# Patient Record
Sex: FEMALE | Race: WHITE | NOT HISPANIC OR LATINO | Employment: FULL TIME | URBAN - NONMETROPOLITAN AREA
[De-identification: names, ages, dates, MRNs, and addresses within clinical notes are randomized per-mention and may not be internally consistent; named-entity substitution may affect disease eponyms.]

---

## 2017-02-02 ENCOUNTER — OFFICE VISIT - GICH (OUTPATIENT)
Dept: FAMILY MEDICINE | Facility: OTHER | Age: 32
End: 2017-02-02

## 2017-02-02 ENCOUNTER — HISTORY (OUTPATIENT)
Dept: FAMILY MEDICINE | Facility: OTHER | Age: 32
End: 2017-02-02

## 2017-02-02 ENCOUNTER — HOSPITAL ENCOUNTER (OUTPATIENT)
Dept: RADIOLOGY | Facility: OTHER | Age: 32
End: 2017-02-02
Attending: PHYSICIAN ASSISTANT

## 2017-02-02 DIAGNOSIS — F41.9 ANXIETY DISORDER: ICD-10-CM

## 2017-02-02 DIAGNOSIS — R07.89 OTHER CHEST PAIN: ICD-10-CM

## 2017-02-02 DIAGNOSIS — F17.200 NICOTINE DEPENDENCE, UNCOMPLICATED: ICD-10-CM

## 2017-02-02 LAB
ABSOLUTE BASOPHILS - HISTORICAL: 0.1 THOU/CU MM
ABSOLUTE EOSINOPHILS - HISTORICAL: 0.2 THOU/CU MM
ABSOLUTE LYMPHOCYTES - HISTORICAL: 2.3 THOU/CU MM (ref 0.9–2.9)
ABSOLUTE MONOCYTES - HISTORICAL: 0.5 THOU/CU MM
ABSOLUTE NEUTROPHILS - HISTORICAL: 3.8 THOU/CU MM (ref 1.7–7)
ANION GAP - HISTORICAL: 10 (ref 5–18)
BASOPHILS # BLD AUTO: 0.8 %
BUN SERPL-MCNC: 6 MG/DL (ref 7–25)
BUN/CREAT RATIO - HISTORICAL: 9
CALCIUM SERPL-MCNC: 9.3 MG/DL (ref 8.6–10.3)
CHLORIDE SERPLBLD-SCNC: 106 MMOL/L (ref 98–107)
CO2 SERPL-SCNC: 23 MMOL/L (ref 21–31)
CREAT SERPL-MCNC: 0.68 MG/DL (ref 0.7–1.3)
D-DIMER, QUANTITATIVE NG/ML - HISTORICAL: <200 NG/ML
EOSINOPHIL NFR BLD AUTO: 2.9 %
ERYTHROCYTE [DISTWIDTH] IN BLOOD BY AUTOMATED COUNT: 11.9 % (ref 11.5–15.5)
GFR IF NOT AFRICAN AMERICAN - HISTORICAL: >60 ML/MIN/1.73M2
GLUCOSE SERPL-MCNC: 96 MG/DL (ref 70–105)
HCT VFR BLD AUTO: 41.7 % (ref 33–51)
HEMOGLOBIN: 14.1 G/DL (ref 12–16)
LYMPHOCYTES NFR BLD AUTO: 33.2 % (ref 20–44)
MCH RBC QN AUTO: 30.4 PG (ref 26–34)
MCHC RBC AUTO-ENTMCNC: 34 G/DL (ref 32–36)
MCV RBC AUTO: 90 FL (ref 80–100)
MONOCYTES NFR BLD AUTO: 6.7 %
NEUTROPHILS NFR BLD AUTO: 56.4 % (ref 42–72)
PLATELET # BLD AUTO: 287 THOU/CU MM (ref 140–440)
PMV BLD: 7.2 FL (ref 6.5–11)
POTASSIUM SERPL-SCNC: 3.8 MMOL/L (ref 3.5–5.1)
RED BLOOD COUNT - HISTORICAL: 4.65 MIL/CU MM (ref 4–5.2)
SODIUM SERPL-SCNC: 139 MMOL/L (ref 133–143)
TSH - HISTORICAL: 1.01 UIU/ML (ref 0.34–5.6)
WHITE BLOOD COUNT - HISTORICAL: 6.8 THOU/CU MM (ref 4.5–11)

## 2017-02-02 ASSESSMENT — ANXIETY QUESTIONNAIRES
6. BECOMING EASILY ANNOYED OR IRRITABLE: NEARLY EVERY DAY
1. FEELING NERVOUS, ANXIOUS, OR ON EDGE: NEARLY EVERY DAY
2. NOT BEING ABLE TO STOP OR CONTROL WORRYING: NEARLY EVERY DAY
4. TROUBLE RELAXING: NEARLY EVERY DAY
5. BEING SO RESTLESS THAT IT IS HARD TO SIT STILL: NEARLY EVERY DAY
3. WORRYING TOO MUCH ABOUT DIFFERENT THINGS: NEARLY EVERY DAY
7. FEELING AFRAID AS IF SOMETHING AWFUL MIGHT HAPPEN: MORE THAN HALF THE DAYS
GAD7 TOTAL SCORE: 20

## 2017-02-02 ASSESSMENT — PATIENT HEALTH QUESTIONNAIRE - PHQ9: SUM OF ALL RESPONSES TO PHQ QUESTIONS 1-9: 0

## 2017-03-06 ENCOUNTER — COMMUNICATION - GICH (OUTPATIENT)
Dept: FAMILY MEDICINE | Facility: OTHER | Age: 32
End: 2017-03-06

## 2017-03-06 DIAGNOSIS — F41.9 ANXIETY DISORDER: ICD-10-CM

## 2017-03-13 ENCOUNTER — HISTORY (OUTPATIENT)
Dept: FAMILY MEDICINE | Facility: OTHER | Age: 32
End: 2017-03-13

## 2017-03-13 ENCOUNTER — OFFICE VISIT - GICH (OUTPATIENT)
Dept: FAMILY MEDICINE | Facility: OTHER | Age: 32
End: 2017-03-13

## 2017-03-13 DIAGNOSIS — J30.89 OTHER ALLERGIC RHINITIS: ICD-10-CM

## 2017-03-13 DIAGNOSIS — F41.9 ANXIETY DISORDER: ICD-10-CM

## 2017-03-13 ASSESSMENT — ANXIETY QUESTIONNAIRES
7. FEELING AFRAID AS IF SOMETHING AWFUL MIGHT HAPPEN: NOT AT ALL
6. BECOMING EASILY ANNOYED OR IRRITABLE: MORE THAN HALF THE DAYS
3. WORRYING TOO MUCH ABOUT DIFFERENT THINGS: SEVERAL DAYS
GAD7 TOTAL SCORE: 10
1. FEELING NERVOUS, ANXIOUS, OR ON EDGE: MORE THAN HALF THE DAYS
2. NOT BEING ABLE TO STOP OR CONTROL WORRYING: SEVERAL DAYS
4. TROUBLE RELAXING: MORE THAN HALF THE DAYS
5. BEING SO RESTLESS THAT IT IS HARD TO SIT STILL: MORE THAN HALF THE DAYS

## 2017-03-13 ASSESSMENT — PATIENT HEALTH QUESTIONNAIRE - PHQ9: SUM OF ALL RESPONSES TO PHQ QUESTIONS 1-9: 2

## 2017-09-26 ENCOUNTER — HISTORY (OUTPATIENT)
Dept: EMERGENCY MEDICINE | Facility: OTHER | Age: 32
End: 2017-09-26

## 2017-09-29 ENCOUNTER — COMMUNICATION - GICH (OUTPATIENT)
Dept: OBGYN | Facility: OTHER | Age: 32
End: 2017-09-29

## 2017-09-29 ENCOUNTER — AMBULATORY - GICH (OUTPATIENT)
Dept: OBGYN | Facility: OTHER | Age: 32
End: 2017-09-29

## 2017-09-29 DIAGNOSIS — Z33.1 PREGNANT STATE, INCIDENTAL: ICD-10-CM

## 2017-10-03 ENCOUNTER — HISTORY (OUTPATIENT)
Dept: OBGYN | Facility: OTHER | Age: 32
End: 2017-10-03

## 2017-10-03 ENCOUNTER — HOSPITAL ENCOUNTER (OUTPATIENT)
Dept: RADIOLOGY | Facility: OTHER | Age: 32
End: 2017-10-03

## 2017-10-03 ENCOUNTER — PRENATAL OFFICE VISIT - GICH (OUTPATIENT)
Dept: OBGYN | Facility: OTHER | Age: 32
End: 2017-10-03

## 2017-10-03 DIAGNOSIS — Z34.90 ENCOUNTER FOR SUPERVISION OF NORMAL PREGNANCY: ICD-10-CM

## 2017-10-03 DIAGNOSIS — O20.0 THREATENED ABORTION: ICD-10-CM

## 2017-10-17 ENCOUNTER — HISTORY (OUTPATIENT)
Dept: OBGYN | Facility: OTHER | Age: 32
End: 2017-10-17

## 2017-10-17 ENCOUNTER — PRENATAL OFFICE VISIT - GICH (OUTPATIENT)
Dept: OBGYN | Facility: OTHER | Age: 32
End: 2017-10-17

## 2017-10-17 DIAGNOSIS — F41.9 ANXIETY DISORDER: ICD-10-CM

## 2017-10-17 DIAGNOSIS — Z12.4 ENCOUNTER FOR SCREENING FOR MALIGNANT NEOPLASM OF CERVIX: ICD-10-CM

## 2017-10-17 DIAGNOSIS — Z72.0 TOBACCO USE: ICD-10-CM

## 2017-10-17 DIAGNOSIS — O21.9 VOMITING OF PREGNANCY: ICD-10-CM

## 2017-10-17 DIAGNOSIS — Z71.6 TOBACCO ABUSE COUNSELING: ICD-10-CM

## 2017-10-17 DIAGNOSIS — Z34.01 ENCOUNTER FOR SUPERVISION OF NORMAL FIRST PREGNANCY IN FIRST TRIMESTER: ICD-10-CM

## 2017-10-17 LAB
ABORH - HISTORICAL: NORMAL
ABSOLUTE BASOPHILS - HISTORICAL: 0 THOU/CU MM
ABSOLUTE EOSINOPHILS - HISTORICAL: 0.2 THOU/CU MM
ABSOLUTE IMMATURE GRANULOCYTES(METAS,MYELOS,PROS) - HISTORICAL: 0 THOU/CU MM
ABSOLUTE LYMPHOCYTES - HISTORICAL: 2.2 THOU/CU MM (ref 0.9–2.9)
ABSOLUTE MONOCYTES - HISTORICAL: 0.7 THOU/CU MM
ABSOLUTE NEUTROPHILS - HISTORICAL: 6.8 THOU/CU MM (ref 1.7–7)
ANTIBODY SCREEN - HISTORICAL: NEGATIVE
BASOPHILS # BLD AUTO: 0.3 %
EOSINOPHIL NFR BLD AUTO: 1.5 %
ERYTHROCYTE [DISTWIDTH] IN BLOOD BY AUTOMATED COUNT: 12 % (ref 11.5–15.5)
HCT VFR BLD AUTO: 36.9 % (ref 33–51)
HEMOGLOBIN: 12.9 G/DL (ref 12–16)
IMMATURE GRANULOCYTES(METAS,MYELOS,PROS) - HISTORICAL: 0.4 %
LYMPHOCYTES NFR BLD AUTO: 22.1 % (ref 20–44)
MCH RBC QN AUTO: 30.9 PG (ref 26–34)
MCHC RBC AUTO-ENTMCNC: 35 G/DL (ref 32–36)
MCV RBC AUTO: 88 FL (ref 80–100)
MONOCYTES NFR BLD AUTO: 7.2 %
NEUTROPHILS NFR BLD AUTO: 68.5 % (ref 42–72)
PLATELET # BLD AUTO: 261 THOU/CU MM (ref 140–440)
PMV BLD: 9.5 FL (ref 6.5–11)
RED BLOOD COUNT - HISTORICAL: 4.18 MIL/CU MM (ref 4–5.2)
RUBELLA COMMENT - HISTORICAL: NORMAL
SPECIMEN EXPIRATION DATE/TIME - HISTORICAL: NORMAL
WHITE BLOOD COUNT - HISTORICAL: 9.9 THOU/CU MM (ref 4.5–11)

## 2017-10-17 ASSESSMENT — PATIENT HEALTH QUESTIONNAIRE - PHQ9: SUM OF ALL RESPONSES TO PHQ QUESTIONS 1-9: 9

## 2017-10-18 LAB
HBSAG CATEGORY - HISTORICAL: NONREACTIVE
HIV-1/HIV-2 ANTIBODY CATEGORY - HISTORICAL: NORMAL

## 2017-10-19 LAB — TREPONEMA PALLIDUM - HISTORICAL: NEGATIVE

## 2017-10-24 ENCOUNTER — COMMUNICATION - GICH (OUTPATIENT)
Dept: OBGYN | Facility: OTHER | Age: 32
End: 2017-10-24

## 2017-10-24 LAB — HPV RESULTS - HISTORICAL: NEGATIVE

## 2017-11-17 ENCOUNTER — PRENATAL OFFICE VISIT - GICH (OUTPATIENT)
Dept: OBGYN | Facility: OTHER | Age: 32
End: 2017-11-17

## 2017-11-17 ENCOUNTER — HISTORY (OUTPATIENT)
Dept: OBGYN | Facility: OTHER | Age: 32
End: 2017-11-17

## 2017-11-17 DIAGNOSIS — F41.9 ANXIETY DISORDER: ICD-10-CM

## 2017-11-17 DIAGNOSIS — Z34.01 ENCOUNTER FOR SUPERVISION OF NORMAL FIRST PREGNANCY IN FIRST TRIMESTER: ICD-10-CM

## 2017-12-07 ENCOUNTER — COMMUNICATION - GICH (OUTPATIENT)
Dept: OBGYN | Facility: OTHER | Age: 32
End: 2017-12-07

## 2017-12-07 DIAGNOSIS — F41.9 ANXIETY DISORDER: ICD-10-CM

## 2017-12-19 ENCOUNTER — PRENATAL OFFICE VISIT - GICH (OUTPATIENT)
Dept: OBGYN | Facility: OTHER | Age: 32
End: 2017-12-19

## 2017-12-19 ENCOUNTER — HISTORY (OUTPATIENT)
Dept: OBGYN | Facility: OTHER | Age: 32
End: 2017-12-19

## 2017-12-19 DIAGNOSIS — Z34.02 ENCOUNTER FOR SUPERVISION OF NORMAL FIRST PREGNANCY IN SECOND TRIMESTER: ICD-10-CM

## 2017-12-19 ASSESSMENT — ANXIETY QUESTIONNAIRES
1. FEELING NERVOUS, ANXIOUS, OR ON EDGE: MORE THAN HALF THE DAYS
GAD7 TOTAL SCORE: 8
4. TROUBLE RELAXING: MORE THAN HALF THE DAYS
3. WORRYING TOO MUCH ABOUT DIFFERENT THINGS: MORE THAN HALF THE DAYS
6. BECOMING EASILY ANNOYED OR IRRITABLE: MORE THAN HALF THE DAYS
7. FEELING AFRAID AS IF SOMETHING AWFUL MIGHT HAPPEN: NOT AT ALL
5. BEING SO RESTLESS THAT IT IS HARD TO SIT STILL: NOT AT ALL
2. NOT BEING ABLE TO STOP OR CONTROL WORRYING: NOT AT ALL

## 2017-12-27 NOTE — PROGRESS NOTES
Patient Information     Patient Name MRN Sex Sofya Platt [id number] Female 1985      Progress Notes by Claudia Morfin MD at 10/3/2017  2:45 PM     Author:  Claudia Morfin MD Service:  (none) Author Type:  Physician     Filed:  10/3/2017  3:15 PM Encounter Date:  10/3/2017 Status:  Signed     :  Claudia Morfin MD (Physician)            [facility]  Confirmation of Pregnancy, ultrasound follow up    S: Sofya Wise is a 32 y.o. yo  here today for pregnancy confirmation. She reports her LMP as Patient's last menstrual period was 2017. She is sure of this date. She confirms early pregnancy symptoms including cramping. She was seen in the ED 1 week ago for this and had an ultrasound that showed a fetal pole without cardiac activity. She is taking prenatal vitamins at this time. This pregnancy was not planned necessarily but had stopped using contraception, open to pregnancy. The patient reports that she is excited about the pregnancy. FOB is involved.     Obstetric History       T0      L0     SAB0   Ectopic0   Multiple0      Past Medical History:     Diagnosis  Date     Cervical dysplasia     cryotherapy .      Depression      Encounter for insertion of mirena IUD      due for removal 16      FH: seizures     following Pertussis vaccination, last seizure .        H/O colposcopy with cervical biopsy     at [facility], abnormal pap smears times 2.  Two sexual partners in her left.       Migraines      Past Surgical History:      Procedure  Laterality Date     HERNIA REPAIR  2 mo     Family History      Problem  Relation Age of Onset     Good Health Mother      Good Health Father      Good Health Brother      Good Health Brother      Good Health Sister      Social History     Social History        Marital status:  Single     Spouse name: N/A     Number of children:  N/A     Years of education:  N/A      Occupational History      Not on file.     Social History Main Topics        Smoking status:  Current Every Day Smoker     Packs/day: 0.25     Types: Cigarettes     Smokeless tobacco:  Never Used     Alcohol use  No     Drug use:  No     Sexual activity:  Not on file     Other Topics  Concern     Not on file      Social History Narrative     No significant works at [employer]   No children                                           O:  /80  Pulse 72  Ht 1.524 m (5')  Wt 56.3 kg (124 lb 3.2 oz)  LMP 2017  Breastfeeding? No  BMI 24.26 kg/m2    Dating ultrasound: done 10/3/2017. 6w2d, +cardiac activity    A: Sofya Wise is a 32 y.o. yo  here today for pregnancy confirmation, US follow up. Reassured with presence of cardiac activity on ultrasound today.     P:   RTC for New OB Intake/Visit    Claudia Morfin MD  OB/GYN  10/3/2017 3:12 PM

## 2017-12-27 NOTE — PROGRESS NOTES
Patient Information     Patient Name MRN Sex Sofya Platt [id number] Female 1985      Progress Notes by Jacquelyn Hicks R.T. ([facility] [place]) at 10/3/2017  2:26 PM     Author:  Jacqu[facility] R.T. [facility] Service:  (none) Author Type:  [employer] - Registered Radiologic Technologist     Filed:  10/3/2017  2:26 PM Date of Service:  10/3/2017  2:26 PM Status:  Signed     :  Jacquelyn Hicks R.T. (ARRT) ([facility] - Registered Radiologic Technologist)            Falls Risk Criteria:    Age 65 and older or under age 4        Sensory deficits    Poor vision    Use of ambulatory aides    Impaired judgment    Unable to walk independently    Meets High Risk criteria for falls:  no

## 2017-12-28 NOTE — ADDENDUM NOTE
Patient Information     Patient Name MRN Sofya Morrison [id number] Female 1985      Addendum Note by Lala Cortes at 10/20/2017  1:26 PM     Author:  Lala Cortes Service:  (none) Author Type:  (none)     Filed:  10/20/2017  1:26 PM Encounter Date:  10/17/2017 Status:  Signed     :  Lala Cortes       Addended by: LALA CORTES on: 10/20/2017 01:26 PM        Modules accepted: Orders

## 2017-12-28 NOTE — PROGRESS NOTES
Patient Information     Patient Name MRSofya Degroot [id number] Female 1985      Progress Notes by Claudia Morfin MD at 2017  3:00 PM     Author:  Claudia Morfin MD Service:  (none) Author Type:  Physician     Filed:  2017  5:00 PM Encounter Date:  2017 Status:  Signed     :  Claudia Morfin MD (Physician)            [facility]  Return OB Visit    S: Patient reports she has been feeling okay. Has significant constipation and associated cramping. Having a BM every 3-5 days. Has not started taking miralax. She denies uterine cramping, vaginal bleeding. She also has questions regarding her anxiety. 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 She sometimes wonders if there is actually a baby inside of her since she doesn't feel any different. She also is nervous about delivery since her mother had a history of big babies and a fourth degree tear. She is wondering if it is okay to take xanax during pregnancy. She has tried therapy in the past for her anxiety but did not find this helpful.     O: /68  Pulse 80  Wt 56.9 kg (125 lb 6.4 oz)  LMP 2017 (Exact Date) Comment: positive pg test  BMI 24.49 kg/m2  Gen: Well-appearing, NAD    FHR: 150    A/P:  Sofya Wise is a 32 y.o.  at 12w5d by LMP c/w 6w2d US, here for return OB visit.  Anxiety, depression: reassured by normal heart tones. Recommend continuing wellbutrin. Discussed risks of xanax in pregnancy. Discussed the balance of avoiding medications in pregnancy but also the importance of good mental health. Offered trial of vistaril for anxiety, which patient agrees to try. This is category A in pregnancy. Continue to monitor    Constipation: encouraged using miralax, which she already has at home.     PNC:   - Prenatal labs reviewed, Rh positive, Rubella immune  - Genetics: desires quad at 16-18 weeks. Declines CF and SMA screening  -  Imaging: dating US at 6w2d. Will order anatomy US at next visit  - Immunizations: s/p flu.   RTC 4 weeks, sooner if issues    Claudia Morfin MD  OB/GYN  11/17/2017 4:54 PM

## 2017-12-28 NOTE — TELEPHONE ENCOUNTER
Patient Information     Patient Name MRN Sofya Morrison [id number] Female 1985      Telephone Encounter by Alexandria Robertson RN at 2017  9:18 AM     Author:  Alexandria Robertson RN Service:  (none) Author Type:  NURS- Registered Nurse     Filed:  2017  9:23 AM Encounter Date:  2017 Status:  Signed     :  Alexandria Robertson RN (NURS- Registered Nurse)            Information given to schedulers as there are openings today in the clinic.   Alexandria Robertson RN............. 2017 9:23 AM

## 2017-12-28 NOTE — PROGRESS NOTES
Patient Information     Patient Name MRN Sex Sofya Platt [id number] Female 1985      Progress Notes by Claudia Morfin MD at 10/17/2017  2:45 PM     Author:  Claudia Morfin MD Service:  (none) Author Type:  Physician     Filed:  10/17/2017  5:43 PM Encounter Date:  10/17/2017 Status:  Signed     :  Claudia Morfin MD (Physician)            INITIAL OB VISIT    HPI  Sofya Wise is a 32 y.o. female  at 8w2d by LMP c/w 6w2d US who presents for first OB visit. This pregnancy was unplanned, but welcome. They were not using contraception but also not necessarily trying.    SYMPTOMS SINCE LMP  Fatigue: Yes  Nausea: Yes- worse last week  Emesis: Yes- intermittently  Bleeding: No  Breast tenderness: Yes    MENSTRUAL HISTORY  LMP:Patient's last menstrual period was 2017 (exact date).  Definite:  Yes  Menses monthly:  Yes  On contraception at conception:  No    PAST PREGNANCIES  OB History                    Para  Term     AB  Living     1   0  0  0   0  0     SAB   TAB  Ectopic  Multiple   Live Births       0   0  0  0                           # Outcome Date  GA  Lbr Torey/2nd  Weight Sex  Delivery Anes PTL Lv   1 Current                                     PAST MEDICAL/SURGICAL HISTORY  Past Medical History:     Diagnosis  Date     Cervical dysplasia     cryotherapy .      Depression      FH: seizures     following Pertussis vaccination, last seizure .        H/O colposcopy with cervical biopsy     at [facility], abnormal pap smears times 2.  Two sexual partners in her left.       Migraines    Has history of depression- was taking Wellbutrin but stopped when she found out about pregnancy. Wondering if she could restart at this was helpful to her.    Past Surgical History:      Procedure  Laterality Date     HERNIA REPAIR  2 mo       Current Outpatient Prescriptions       Medication  Sig Dispense Refill     ALPRAZolam (XANAX) 0.25 mg  tablet Take 1 tablet by mouth 2 times daily if needed for Anxiety. 30 tablet 0     buPROPion (WELLBUTRIN XL) 300 mg Extended-Release tablet Take 1 tablet by mouth every morning. 30 tablet 6     buPROPion (WELLBUTRIN XL) 300 mg Extended-Release tablet Take 1 tablet by mouth every morning. 90 tablet 1     doxylamine (UNISOM, DOXYLAMINE,) 25 mg tablet Take 1 tablet by mouth at bedtime if needed for Sleep. 30 tablet 1     Erythromycin 2% (ERYDERM) 2 % external solution Apply  topically to affected area(s).       loratadine-pseudoephedrine,  mg, 24 hr (LORATADINE-D)  mg per tablet Take 1 tablet by mouth once daily. 30 tablet 5     nicotine (COMMIT) 2 mg lozenge Place 1 Lozenge in mouth, between cheek & gum every hour while awake as needed for Nicotine Craving. 30 Lozenge 1     omeprazole (PRILOSEC) 20 mg Delayed-Release capsule TK 1 C PO ONCE TIME A DAY. TK BEFORE MEALS. DO NOT CRUSH  11     polyethylene glycoL (MIRALAX) 17 gram/dose powder Take 17 g by mouth.       pyridoxine, vitamin B6, (VITAMIN B-6) 25 mg tablet Take 1 tablet by mouth 3 times daily. 90 tablet 1     triamcinolone, 55 mcg each actuation, nasal (NASACORT AQ) 55 mcg nasal spray Inhale 2 Sprays into both nostrils once daily. 16.5 g 11     No current facility-administered medications for this visit.      Medications have been reviewed by me and are current to the best of my knowledge and ability.      Allergies: Pertussis vaccines and Zoloft [sertraline]    SOCIAL HISTORY  Tobacco:  Yes- interested in quitting. Currently smoking 1/4 ppd  Alcohol:  No  Drugs:  No  Single, lives with her boyfriend. Works in [employer]    Family History      Problem  Relation Age of Onset     Good Health Mother      Good Health Father      Good Health Brother      Good Health Brother      Good Health Sister        GENETIC SCREENING:  Maternal pertinent postives: No  Paternal pertinent positives: No    INFECTION HISTORY  Patient or partner with hx HSV:No  Rash  or viral illness since LMP:No  Hepatitis B or C: No  STD (GC, Chlamydia, HPV):No  Varicella vaccine or history: Yes    ROS: see HPI, complete ROS otherwise negative    PHYSICAL EXAM  /70  Pulse 72  Ht 1.524 m (5')  Wt 57.3 kg (126 lb 6.4 oz)  LMP 2017 (Exact Date) Comment: positive pg test  BMI 24.69 kg/m2   General: Pleasant WN female, A and O x3, NAD  HEENT : Grossly normal  Neck: Thyroid normal size, with no nodules, no adenopathy  Breast Exam: No masses or retractions, axilla, supra and infraclavicular areas neg for adenopathy  Lungs: Clear, good AE, no rales or rhonchi  Heart: RRR no murmur , NL S1 and S2  Abdomen: Soft NT, ND, no masses, normal BS  Ext: No edema    Pelvic:  EGBUS Normal  Cervix Normal  Uterus nontender, 8wk size  Adnexa, neg for tenderness or mass  Cx closed /Long / High    FHT: early gestation    IMPRESSION   IUP at 8w2d weeks by LMP c/w 6w2d US    Risk factors identified: depression, tobacco use  High risk pregnancy: No  Repeat C/S planned: No      PLAN:  Tobacco use: interested in cessation. Recommend gum or lozenges- prefers lozenges. Understands not to use the patch.   Nausea, vomiting: recommend starting B6 and unisom. Discussed trying hannah, sea bands. F/u in clinic in 1-2 weeks if not improving.  Depression/anxiety: refilled wellbutrin- category C    Discussed genetic testing available: Yes- patient interested in quad screen. Undecided on CF or SMA screening- will discuss at next appt  1st trimester US ordered/completed: Yes  Anesthesia consult needed: No  OB/Gyn or MFM referral: No    GC/Chlam screening, Pap smear, routine labs ordered  Prenatal vitamin daily  Routine 1st trimester anticipatory guidance  Return to office in four weeks for follow up or sooner prn.  Questions answered.    Claudia Morfin MD  OB/GYN  10/17/2017 5:32 PM

## 2017-12-30 NOTE — NURSING NOTE
Patient Information     Patient Name MRN Sex Sofya Platt [id number] Female 1985      Nursing Note by Alvaro Barnes LPN at 10/3/2017  2:45 PM     Author:  Alvaro Barnes LPN Service:  (none) Author Type:  NURS- Licensed Practical Nurse     Filed:  10/3/2017  3:15 PM Encounter Date:  10/3/2017 Status:  Signed     :  Alvaro Barnes LPN (NURS- Licensed Practical Nurse)            Patient presents to the clinic for an ultrasound follow up.  Alvaro Barnes LPN ..............10/3/2017 2:53 PM

## 2018-01-03 NOTE — TELEPHONE ENCOUNTER
Patient Information     Patient Name MRN Sofya Morrison [id number] Female 1985      Telephone Encounter by Lashanda Ordonez at 3/7/2017 10:46 AM     Author:  Lashanda Ordonez Service:  (none) Author Type:  (none)     Filed:  3/7/2017 10:46 AM Encounter Date:  3/6/2017 Status:  Signed     :  Lashanda Ordonez            Left message to call back.  Lashanda Ordonez LPN ....................  3/7/2017   10:46 AM

## 2018-01-03 NOTE — NURSING NOTE
Patient Information     Patient Name MRN Sofya Morrison [id number] Female 1985      Nursing Note by Eric Fang at 2017  2:00 PM     Author:  Eric Fang Service:  (none) Author Type:  (none)     Filed:  2017  2:32 PM Encounter Date:  2017 Status:  Signed     :  Eric Fang            Pt here today for anxiety. Started a month ago. Pt stated she just feels short of breath.  Eric Fang LPN .............2017  2:11 PM

## 2018-01-03 NOTE — TELEPHONE ENCOUNTER
Patient Information     Patient Name MRN Sofya Morrison [id number] Female 1985      Telephone Encounter by Chelle Queen PA-C at 3/7/2017 10:26 AM     Author:  Chelle Queen PA-C Service:  (none) Author Type:  PHYS- Physician Assistant     Filed:  3/7/2017 10:28 AM Encounter Date:  3/6/2017 Status:  Signed     :  Chelle Queen PA-C (PHYS- Physician Assistant)            She has a 30 day refill already. She needs to be seen for recheck prior to switching to 90 day refills since she was having a lot of chest symptoms at the visit.   Chelle Queen PA-C ....................  3/7/2017   10:27 AM

## 2018-01-03 NOTE — PROGRESS NOTES
Patient Information     Patient Name MRN Sex Sofya Platt [id number] Female 1985      Progress Notes by Chelle Queen PA-C at 3/13/2017 12:45 PM     Author:  Chelle Queen PA-C Service:  (none) Author Type:  PHYS- Physician Assistant     Filed:  3/13/2017  1:27 PM Encounter Date:  3/13/2017 Status:  Signed     :  Chelle Queen PA-C (PHYS- Physician Assistant)            Nursing Notes:   Lashanda Ordonez  3/13/2017  1:00 PM  Signed  Patient presents to the clinic for follow up on anxiety.  Lashanda Ordonez LPN........................3/13/2017  12:49 PM      HPI:    Sofya Wise is a 31 y.o. female who presents for follow up on anxiety. Patient states she is doing better. She is still having a few panic attacks. She says her shortness of breath and heart racing is limited to when she has a panic attack now. She is feeling a little better. Some down depressed thoughts. No suicidal or homicidal ideation appreciated. No weight changes. She is exercising. Work and home stressors make her anxiety worse. She has used lorazepam a few times to help calm the anxiety attack down. She states that she tried the nicotine patches for smoking cessation and this did not help. She has not tried the gums.    Patient does have history of allergic rhinitis. She is using loratadine which helps mildly. She is wondering if there is something that is better that will help. She has always sniffling and has a runny nose. Unsure of her specific allergy cause.    Past Medical History      Diagnosis   Date     Cervical dysplasia       cryotherapy .      Depression       Encounter for insertion of mirena IUD        due for removal 16      FH: seizures       following Pertussis vaccination, last seizure .        H/O colposcopy with cervical biopsy       at [facility], abnormal pap smears times 2.  Two sexual partners in her left.       Migraines         Past Surgical History       Procedure  Laterality Date     Hernia repair  2 mo       Social History       Substance Use Topics         Smoking status:  Current Every Day Smoker      Packs/day: 0.50      Types: Cigarettes      Smokeless tobacco:  Never Used      Alcohol use  0.6 oz/week     1 Standard drinks or equivalent per week        Current Outpatient Prescriptions       Medication  Sig Dispense Refill     ALPRAZolam (XANAX) 0.25 mg tablet Take 1 tablet by mouth 2 times daily if needed for Anxiety. 30 tablet 0     buPROPion (WELLBUTRIN XL) 150 mg Extended-Release tablet Take 1 tablet by mouth every morning. 30 tablet 1     Erythromycin 2% (ERYDERM) 2 % external solution Apply  topically to affected area(s).       etonogestrel-ethinyl estradiol (NUVARING) vaginal ring Insert 1 ring vaginally and leave in place for 3 consecutive weeks, then remove for 1 week. Repeat with new ring. 3 ring 3     loratadine (CLARITIN) 10 mg tablet Take 10 mg by mouth.       omeprazole (PRILOSEC) 20 mg Delayed-Release capsule TK 1 C PO ONCE TIME A DAY. TK BEFORE MEALS. DO NOT CRUSH  11     polyethylene glycoL (MIRALAX) 17 gram/dose powder Take 17 g by mouth.       No current facility-administered medications for this visit.      Medications have been reviewed by me and are current to the best of my knowledge and ability.      Allergies     Allergen  Reactions     Pertussis Vaccines Seizures     Zoloft [Sertraline] Dizziness       REVIEW OF SYSTEMS:  Refer to HPI.    EXAM:   Vitals:    /64  Pulse 84  Wt 56.6 kg (124 lb 12.8 oz)  Breastfeeding? No  BMI 24.37 kg/m2  General appearance: appropriately dressed and well groomed  Pt's manner is cooperative and engaged in interview, affect appropriate for situation and matches verbal content and speech is fluent and normal volume and tone.  No SI or HI appreciated.    PHQ Depression Screening 2/2/2017 3/13/2017   Date of PHQ exam (doc flow) 2/2/2017 3/13/2017   1. Lack of interest/pleasure 0 - Not at all 0 - Not  at all   2. Feeling down/depressed 0 - Not at all 1 - Several days   PHQ-2 TOTAL SCORE 0 1   3. Trouble sleeping 0 - Not at all 0 - Not at all   4. Decreased energy 0 - Not at all 1 - Several days   5. Appetite change 0 - Not at all 0 - Not at all   6. Feelings of failure 0 - Not at all 0 - Not at all   7. Trouble concentrating 0 - Not at all 0 - Not at all   8. Activity level 0 - Not at all 0 - Not at all   9. Hurting yourself 0 - Not at all 0 - Not at all   PHQ-9 TOTAL SCORE 0 2   PHQ-9 Severity Level none none   Functional Impairment not difficult at all not applicable       JANINE-7 ANXIETY SCREENING 2/2/2017 3/13/2017   JANINE date (doc flow) 2/2/2017 3/13/2017   Nervous, anxious 3 2   Cannot stop worrying 3 1   Worry about different things 3 1   Cannot relax 3 2   Feeling restless 3 2   Easily annoyed/irritated 3 2   Afraid of awful event 2 0   Score 20 10   Severity severe anxiety moderate anxiety       ASSESSMENT AND PLAN:      ICD-10-CM    1. Severe anxiety F41.9 buPROPion (WELLBUTRIN XL) 300 mg Extended-Release tablet   2. Perennial allergic rhinitis, unspecified allergic rhinitis trigger J30.89 loratadine-pseudoephedrine,  mg, 24 hr (LORATADINE-D)  mg per tablet      triamcinolone, 55 mcg each actuation, nasal (NASACORT AQ) 55 mcg nasal spray       Increased wellbutrin to 300 mg daily.   Return in about 3 months for recheck.   Encouraged good diet and exercise.   Return to clinic with change/worsening of symptoms.     Started patient on Claritin-D along with Nasacort nasal spray for allergies.  Return to clinic with change/worsening of symptoms.     Patient Instructions     Increased wellbutrin to 300 mg daily.   Return in about 3 months for recheck.   Encouraged good diet and exercise.   Return to clinic with change/worsening of symptoms.     [place] counselors/therapists   Telephone Hours Kids? Address   [facility]  (Many counselors) [phone] M-Th 8-5  F 8-12 Yes [address]  [place]   [email or web address]   [facility]  (Many counselors) [phone]  Yes [address]   [email or web address]   [facility]  (Many counselors) [phone]  Yes [address]  [email or web address]   [facility]  Adam Lamas [phone]   Yes [address]   [email or web address]   [facility]  Surjit Stenlunatalya Chatterjee [phone]  Yes [address]   [address]  [email or web address]   Rony Vazquez [phone]   [address]   Angelita Montero [phone]   [address]   Emilie Yousif [phone]   [address]   Zulema Morfin [phone]  Yes [address]   Trishacary Jericho [phone]   [address] [place]    Niraj Papito [phone]   [address]   Janice Hairston [phone]   [address] [place] [address]   [email or web address]   Barbara Kilpatrick [phone]   [address] [place] [address] [address]  ([facility])  [email or web address]   Adam Bainsnafisa [phone]   [address]   [facility]: [place] [phone]  Yes AWA Barba  [address]  [email or web address]   [facility]: [place] [phone]  Yes AWA Jara  [address]  [email or web address]       Suicide Emergency First call for help:  [phone]      Reviewed risks and benefits of medications and other treatment options.   Pt is advised to call if side effects to medications occur, especially if exaggerated/dramatic.    Chelle Queen PA-C..................3/13/2017 12:59 PM

## 2018-01-03 NOTE — PATIENT INSTRUCTIONS
Patient Information     Patient Name MRN Sofya Morrison [id number] Female 1985      Patient Instructions by Chelle Queen PA-C at 3/13/2017 12:45 PM     Author:  Chelle Queen PA-C Service:  (none) Author Type:  PHYS- Physician Assistant     Filed:  3/13/2017  1:09 PM Encounter Date:  3/13/2017 Status:  Signed     :  Chelle Queen PA-C (PHYS- Physician Assistant)            Increased wellbutrin to 300 mg daily.   Return in about 3 months for recheck.   Encouraged good diet and exercise.   Return to clinic with change/worsening of symptoms.     [place] counselors/therapists   Telephone Hours Kids? Address   [facility]  (Many counselors) [phone] M-Th 8-5  F 8-12 Yes [address] [place] [address]   http://www.[facility]   [facility]  (Many counselors) [phone]  Yes [address] [facility] [address] [place]   http://www.[facility].org   [facility]  (Many counselors) [phone]  Yes [address] [place]  http://www.[email or web address]/   [facility]  Adam Lamas [phone]   Yes [address] [facility] [address]   http://[email or web address]   [facility]  Surjit Chatterjee [phone]  Yes [address]   [address]  [email or web address]   Rony Vazquez [phone]   [address]   Angelita Villedadamian [phone]   [address]   Emilie Nica [phone]   [address]   Zulema Morfin [phone]  Yes [address]   Violet Echavarria [phone]   [address] [place]    Niraj Cobos [phone]   [address]   Janice Hairston [phone]   10   [address] [place] [address]   http://[email or web address].[facility].[email or web address]   Barbara Kilpatrick [phone]   [address] [place] [address] [address]  ([facility])  [email or web address]   Adam Torres [phone]   [address] Health: [place] [phone]  Yes AWA Barba  [address] [place] [address]  http://[email or web address]/   [facility]: [place] [phone]  Yes [place] [address]  http://www.[facility].org/       Suicide Emergency First call for help:  [phone]

## 2018-01-03 NOTE — PROGRESS NOTES
Patient Information     Patient Name MRN Sofya Morrison [id number] Female 1985      Progress Notes by Chelle Queen PA-C at 2017  2:00 PM     Author:  Chelle Queen PA-C Service:  (none) Author Type:  PHYS- Physician Assistant     Filed:  2017  4:19 PM Encounter Date:  2017 Status:  Signed     :  Chelle Queen PA-C (PHYS- Physician Assistant)            Nursing Notes:   Eric Fang  2017  2:32 PM  Signed  Pt here today for anxiety. Started a month ago. Pt stated she just feels short of breath.  Eric Fang LPN .............2017  2:11 PM      HPI:    Sofya Wise is a 31 y.o. female who presents for anxiety. Started a month ago. Pt stated she just feels short of breath. Heart racing. Anxious. Body flush. Racing thoughts. Patient has history of being on anxiety medications in the past. History of depression. No suicidal or homicidal ideation appreciated. No weight changes recently. Normal interest. Patient feels that her anxiety has been stable over the last few years however it is getting worse over the last month. Work and home stressors make her anxiety worse. History of lorazepam in the past to help calm things down. The breathing concern with shortness of breath is new. She has not had this in the past. She wakes up out of breath and anxious at times. Racing thoughts. Sometimes some mild chest pressure. No palpitations.  Would like to quit smoking. Wondering if chantix would be a good idea.       Past Medical History      Diagnosis   Date     Cervical dysplasia       cryotherapy .      Depression       Encounter for insertion of mirena IUD        due for removal 16      FH: seizures       following Pertussis vaccination, last seizure .        H/O colposcopy with cervical biopsy       at [facility], abnormal pap smears times 2.  Two sexual partners in her left.       Migraines         Past Surgical History      Procedure   Laterality Date     Hernia repair  2 mo       Social History       Substance Use Topics         Smoking status:  Current Every Day Smoker      Packs/day: 0.50      Types: Cigarettes      Smokeless tobacco:  Never Used      Alcohol use  0.6 oz/week     1 Standard drinks or equivalent per week        Current Outpatient Prescriptions       Medication  Sig Dispense Refill     etonogestrel-ethinyl estradiol (NUVARING) vaginal ring Insert 1 ring vaginally and leave in place for 3 consecutive weeks, then remove for 1 week. Repeat with new ring. 3 ring 3     omeprazole (PRILOSEC) 20 mg Delayed-Release capsule TK 1 C PO ONCE TIME A DAY. TK BEFORE MEALS. DO NOT CRUSH  11     No current facility-administered medications for this visit.      Medications have been reviewed by me and are current to the best of my knowledge and ability.      Allergies     Allergen  Reactions     Pertussis Vaccines Seizures     Zoloft [Sertraline] Dizziness       REVIEW OF SYSTEMS:  Refer to HPI.    EXAM:   Vitals:    /82  Pulse 80  Ht 1.524 m (5')  Wt 57.2 kg (126 lb 3.2 oz)  SpO2 98%  BMI 24.65 kg/m2  General appearance: appropriately dressed and well groomed  Pt's manner is cooperative and engaged in interview, affect appropriate for situation and matches verbal content and speech is fluent and normal volume and tone.  No SI or HI appreciated.  Chest/Respiratory Exam: Normal chest wall and respirations. Clear to auscultation.  Cardiovascular Exam: Regular rate and rhythm. S1, S2, no murmur, click, gallop, or rubs.      PHQ Depression Screening 9/12/2016 2/2/2017   Date of PHQ exam (doc flow) 9/12/2016 2/2/2017   1. Lack of interest/pleasure 0 - Not at all 0 - Not at all   2. Feeling down/depressed 0 - Not at all 0 - Not at all   PHQ-2 TOTAL SCORE 0 0   3. Trouble sleeping - 0 - Not at all   4. Decreased energy - 0 - Not at all   5. Appetite change - 0 - Not at all   6. Feelings of failure - 0 - Not at all   7. Trouble concentrating - 0 -  Not at all   8. Activity level - 0 - Not at all   9. Hurting yourself - 0 - Not at all   PHQ-9 TOTAL SCORE - 0   PHQ-9 Severity Level - none   Functional Impairment - not difficult at all       JANINE-7 ANXIETY SCREENING 2/2/2017   JANINE date (doc flow) 2/2/2017   Nervous, anxious 3   Cannot stop worrying 3   Worry about different things 3   Cannot relax 3   Feeling restless 3   Easily annoyed/irritated 3   Afraid of awful event 2   Score 20   Severity severe anxiety       Results for orders placed or performed in visit on 02/02/17      BASIC METABOLIC PANEL      Result  Value Ref Range    SODIUM 139 133 - 143 mmol/L    POTASSIUM 3.8 3.5 - 5.1 mmol/L    CHLORIDE 106 98 - 107 mmol/L    CO2,TOTAL 23 21 - 31 mmol/L    ANION GAP 10 5 - 18                    GLUCOSE 96 70 - 105 mg/dL    CALCIUM 9.3 8.6 - 10.3 mg/dL    BUN 6 (L) 7 - 25 mg/dL    CREATININE 0.68 (L) 0.70 - 1.30 mg/dL    BUN/CREAT RATIO           9                    GFR if African American >60 >60 ml/min/1.73m2    GFR if not African American >60 >60 ml/min/1.73m2   TSH      Result  Value Ref [place]    TSH 1.01 0.34 - 5.60 uIU/mL   D-DIMER,QUANTITATIVE      Result  Value Ref Range    D-DIMER, QUANTITATIVE  <200 >199 - 230 ng/mL   CBC WITH AUTO DIFFERENTIAL      Result  Value Ref Range    WHITE BLOOD COUNT         6.8 4.5 - 11.0 thou/cu mm    RED BLOOD COUNT           4.65 4.00 - 5.20 mil/cu mm    HEMOGLOBIN                14.1 12.0 - 16.0 g/dL    HEMATOCRIT                41.7 33.0 - 51.0 %    MCV                       90 80 - 100 fL    MCH                       30.4 26.0 - 34.0 pg    MCHC                      34.0 32.0 - 36.0 g/dL    RDW                       11.9 11.5 - 15.5 %    PLATELET COUNT            287 140 - 440 thou/cu mm    MPV                       7.2 6.5 - 11.0 fL    NEUTROPHILS               56.4 42.0 - 72.0 %    LYMPHOCYTES               33.2 20.0 - 44.0 %    MONOCYTES                 6.7 <12.0 %    EOSINOPHILS               2.9 <8.0 %    BASOPHILS                  0.8 <3.0 %    ABSOLUTE NEUTROPHILS      3.8 1.7 - 7.0 thou/cu mm    ABSOLUTE LYMPHOCYTES      2.3 0.9 - 2.9 thou/cu mm    ABSOLUTE MONOCYTES        0.5 <0.9 thou/cu mm    ABSOLUTE EOSINOPHILS      0.2 <0.5 thou/cu mm    ABSOLUTE BASOPHILS        0.1 <0.3 thou/cu mm       ASSESSMENT AND PLAN:      ICD-10-CM    1. Severe anxiety F41.9 buPROPion (WELLBUTRIN XL) 150 mg Extended-Release tablet      ALPRAZolam (XANAX) 0.25 mg tablet   2. Chest pressure R07.89 EKG 12 LEAD UNIT PERFORMED      CBC AND DIFFERENTIAL      BASIC METABOLIC PANEL      TSH      XR CHEST 2 VIEWS PA AND LATERAL      D-DIMER,QUANTITATIVE      CBC AND DIFFERENTIAL      BASIC METABOLIC PANEL      TSH      D-DIMER,QUANTITATIVE      CBC WITH AUTO DIFFERENTIAL      ECHO COMPLETE WO CONTRAST      HOLTER MONITOR 48 HOURS      [place] ELECTROCARDIOGRAM TRACING   3. Tobacco dependence F17.200 WV BEHAV CHNG SMOKING 3-10 MIN       Completed chest xray.  I personally reviewed the xray. I found no concerns appreciated upon initial read of xray.  Final read pending by radiology.    Patient had an unremarkable EKG. Normal CBC, BMP, TSH, d-dimer.    Ordered echo and holter monitor to rule out concerns.     Started on wellbutrin for anxiety and smoking cessation.   Encouraged good diet and exercise.   Return in 4-6 weeks for recheck.   Return to clinic with change/worsening of symptoms.     Greater than 3 minutes was spent in consultation and education regarding tobacco cessation due to diagnosis of tobacco dependence and associated long-term complications of continued tobacco use.      Patient Instructions     Completed labs.     Ordered echo and holter monitor to rule out concerns.     Started on wellbutrin for anxiety and smoking cessation.   Encouraged good diet and exercise.   Return in 4-6 weeks for recheck.   Return to clinic with change/worsening of symptoms.     [place] counselors/therapists   Telephone Hours Kids? Address   [facility]  Counseling  (Many counselors) [phone] M-Th 8-5  F 8-12 Yes [address]   http://www.[facility]   [facility] Mental Health  (Many counselors) [phone]  Yes [address] [place]   http://www.[facility].org   [facility]  (Many counselors) [phone]  Yes [address] [place]  http://www.[facility].[email or web address]/   [facility]  Adam Lamas [phone]   Yes [address] [facility] [address]   http://[email or web address]   [facility]  Surjit Chatterjee [phone]  Yes [address]   [address]  [email or web address]   Rony Vazquez [phone]   [address]   Angelita Montero [phone]   [address]   Emilie Nica [phone]   [address]   Zulema Navjot [phone]  Yes [address]   Violet Echavarria [phone]   [address] [place]    Niraj Papito [phone]   [address]   Janice Hairston [phone]   10   [address] [place] [address]   http://[email or web address].[email or web address]   Barbara Kilpatrick [phone]   [address] [place] [address] [address]  ([facility])  [email or web address]   Adam Torres [phone]   [address]   [facility] [place] [phone]  Yes AWA Barba  [address]  http://www.[facility].org/   [facility] [place] [phone]  Yes AWA Jara  [address]  http://www.[facility].org/       Suicide Emergency First call for help:  [phone]  [phone]      Reviewed risks and benefits of medications and other treatment options.   Pt is advised to call if side effects to medications occur, especially if exaggerated/dramatic.  Greater than 25 minutes were spent in counseling and coordination of care.     Chelle Queen PA-C..................2/2/2017 2:32 PM

## 2018-01-03 NOTE — PATIENT INSTRUCTIONS
Patient Information     Patient Name MRN Sofya Morrison [id number] Female 1985      Patient Instructions by Chelle Queen PA-C at 2017  2:00 PM     Author:  Chelle Queen PA-C Service:  (none) Author Type:  PHYS- Physician Assistant     Filed:  2017  3:20 PM Encounter Date:  2017 Status:  Signed     :  Chelle Queen PA-C (PHYS- Physician Assistant)            Completed labs.     Ordered echo and holter monitor to rule out concerns.     Started on wellbutrin for anxiety and smoking cessation.   Encouraged good diet and exercise.   Return in 4-6 weeks for recheck.   Return to clinic with change/worsening of symptoms.     [place] counselors/therapists   Telephone Hours Kids? Address   [facility]  (Many counselors) [phone] M-Th 8-5  F 8-12 Yes [address] [place] [address]   http://www.[facility]   [facility]  (Many counselors) [phone]  Yes [address] [facility] [address] [place]   http://www.[facility].org   [facility]  (Many counselors) [phone]  Yes [address] [place]  http://www.[facility].org/   [facility]  Adam Lamas [phone]   Yes [address] [facility] 169   http://[email or web address]/   [facility]  Surjit Chatterjee [phone]  Yes [address]   [address]  [email or web address]://[email or web address]   Rony Vazquez [phone]   [address]   Angelita Montero [phone]   [address]   Emilie Yousif [phone]   [address] [facility] [address]   Zulema Morfin [phone]  Yes [address]   Violet Echavarria [phone]   [address] [place]    Niraj Cobos [phone]   [address]   Janice Marika [phone]   10   [address] [place] [address]   http://[email or web address]   Barbara Kilpatrick [phone]   [address] [place] [address]  ([facility])  [email or web address]   Adam Torres [phone]   [address] [place] [address] Mental Health: [place] [phone]  Yes AWA Barba  [address]  http://www.[facility].org/   [facility]: [place] [phone]  Yes AWA Jara  [address] [place] [address]  http://www.[facility].org/       Suicide Emergency First call for help:  [phone]

## 2018-01-03 NOTE — NURSING NOTE
Patient Information     Patient Name MRN oSfya Morrison [id number] Female 1985      Nursing Note by Lashanda Ordonez at 3/13/2017 12:45 PM     Author:  Lashanda Ordonez Service:  (none) Author Type:  (none)     Filed:  3/13/2017  1:00 PM Encounter Date:  3/13/2017 Status:  Signed     :  Lashanda Ordonez            Patient presents to the clinic for follow up on anxiety.  Lashanda Ordonez LPN........................3/13/2017  12:49 PM

## 2018-01-03 NOTE — TELEPHONE ENCOUNTER
Patient Information     Patient Name MRN Sofya Morrison [id number] Female 1985      Telephone Encounter by Norma Smith RN at 3/7/2017  9:42 AM     Author:  Norma Smith RN Service:  (none) Author Type:  NURS- Registered Nurse     Filed:  3/7/2017  9:43 AM Encounter Date:  3/6/2017 Status:  Signed     :  Norma Smith RN (NURS- Registered Nurse)            This is a Refill request from: [facility]  Name of Medication: buPROPion (WELLBUTRIN XL) 150 mg Extended-Release tablet  Take 1 tablet by mouth every morning.  Quantity requested: 90  Last fill date: filled #30 17  Due for refill: yes  Last visit with QUINTIN FERGUSON was on: No past appointments listed with this provider  PCP:  Brenden Morillo MD  Controlled Substance Agreement:  na   Diagnosis r/t this medication request: anxiety    Patient requesting #90 day supply instead of 30. Was stared on this by migue Queen and told to FU in 4 weeks, has not been back in. Is this ok to refill for #90?     Unable to complete prescription refill per RN Medication Refill Policy.................... NORMA SMITH RN ....................  3/7/2017   9:42 AM

## 2018-01-03 NOTE — TELEPHONE ENCOUNTER
Patient Information     Patient Name MRN Sofya Morrison [id number] Female 1985      Telephone Encounter by Lashanda Ordonez at 3/7/2017  3:31 PM     Author:  Lashanda Ordonez Service:  (none) Author Type:  (none)     Filed:  3/7/2017  3:31 PM Encounter Date:  3/6/2017 Status:  Signed     :  Lashanda Ordonez            Patient was notified.  Lashanda Ordonez LPN........................3/7/2017  3:31 PM

## 2018-01-09 ENCOUNTER — HOSPITAL ENCOUNTER (OUTPATIENT)
Dept: RADIOLOGY | Facility: OTHER | Age: 33
End: 2018-01-09

## 2018-01-09 DIAGNOSIS — Z34.02 ENCOUNTER FOR SUPERVISION OF NORMAL FIRST PREGNANCY IN SECOND TRIMESTER: ICD-10-CM

## 2018-01-16 ENCOUNTER — PRENATAL OFFICE VISIT - GICH (OUTPATIENT)
Dept: OBGYN | Facility: OTHER | Age: 33
End: 2018-01-16

## 2018-01-16 ENCOUNTER — HISTORY (OUTPATIENT)
Dept: OBGYN | Facility: OTHER | Age: 33
End: 2018-01-16

## 2018-01-16 DIAGNOSIS — L98.9 DISORDER OF SKIN OR SUBCUTANEOUS TISSUE: ICD-10-CM

## 2018-01-16 DIAGNOSIS — Z34.02 ENCOUNTER FOR SUPERVISION OF NORMAL FIRST PREGNANCY IN SECOND TRIMESTER: ICD-10-CM

## 2018-01-16 ASSESSMENT — ANXIETY QUESTIONNAIRES
4. TROUBLE RELAXING: SEVERAL DAYS
3. WORRYING TOO MUCH ABOUT DIFFERENT THINGS: SEVERAL DAYS
6. BECOMING EASILY ANNOYED OR IRRITABLE: SEVERAL DAYS
5. BEING SO RESTLESS THAT IT IS HARD TO SIT STILL: NOT AT ALL
7. FEELING AFRAID AS IF SOMETHING AWFUL MIGHT HAPPEN: NOT AT ALL
2. NOT BEING ABLE TO STOP OR CONTROL WORRYING: NOT AT ALL
GAD7 TOTAL SCORE: 4
1. FEELING NERVOUS, ANXIOUS, OR ON EDGE: SEVERAL DAYS

## 2018-01-16 ASSESSMENT — PATIENT HEALTH QUESTIONNAIRE - PHQ9: SUM OF ALL RESPONSES TO PHQ QUESTIONS 1-9: 0

## 2018-01-25 VITALS
WEIGHT: 126.4 LBS | HEIGHT: 60 IN | BODY MASS INDEX: 24.81 KG/M2 | DIASTOLIC BLOOD PRESSURE: 70 MMHG | SYSTOLIC BLOOD PRESSURE: 120 MMHG | HEART RATE: 72 BPM

## 2018-01-25 VITALS
HEIGHT: 60 IN | SYSTOLIC BLOOD PRESSURE: 110 MMHG | DIASTOLIC BLOOD PRESSURE: 68 MMHG | HEART RATE: 72 BPM | SYSTOLIC BLOOD PRESSURE: 110 MMHG | DIASTOLIC BLOOD PRESSURE: 80 MMHG | OXYGEN SATURATION: 98 % | HEART RATE: 80 BPM | HEIGHT: 60 IN | SYSTOLIC BLOOD PRESSURE: 120 MMHG | WEIGHT: 125.4 LBS | HEART RATE: 80 BPM | BODY MASS INDEX: 24.77 KG/M2 | WEIGHT: 126.2 LBS | DIASTOLIC BLOOD PRESSURE: 82 MMHG | BODY MASS INDEX: 24.39 KG/M2 | BODY MASS INDEX: 24.49 KG/M2 | WEIGHT: 124.2 LBS

## 2018-01-25 VITALS — DIASTOLIC BLOOD PRESSURE: 64 MMHG | WEIGHT: 124.8 LBS | HEART RATE: 84 BPM | SYSTOLIC BLOOD PRESSURE: 122 MMHG

## 2018-01-28 ENCOUNTER — HEALTH MAINTENANCE LETTER (OUTPATIENT)
Age: 33
End: 2018-01-28

## 2018-01-31 ASSESSMENT — PATIENT HEALTH QUESTIONNAIRE - PHQ9
SUM OF ALL RESPONSES TO PHQ QUESTIONS 1-9: 0
SUM OF ALL RESPONSES TO PHQ QUESTIONS 1-9: 9
SUM OF ALL RESPONSES TO PHQ QUESTIONS 1-9: 2

## 2018-01-31 ASSESSMENT — ANXIETY QUESTIONNAIRES
GAD7 TOTAL SCORE: 10
GAD7 TOTAL SCORE: 20

## 2018-02-08 ENCOUNTER — DOCUMENTATION ONLY (OUTPATIENT)
Dept: FAMILY MEDICINE | Facility: OTHER | Age: 33
End: 2018-02-08

## 2018-02-08 PROBLEM — F41.1 ANXIETY STATE: Status: ACTIVE | Noted: 2018-02-08

## 2018-02-08 PROBLEM — G43.909 MIGRAINE HEADACHE: Status: ACTIVE | Noted: 2018-02-08

## 2018-02-08 RX ORDER — ERYTHROMYCIN 20 MG/ML
SOLUTION TOPICAL
COMMUNITY
Start: 2016-12-01 | End: 2018-02-15

## 2018-02-08 RX ORDER — HYDROXYZINE HYDROCHLORIDE 25 MG/1
25 TABLET, FILM COATED ORAL 4 TIMES DAILY PRN
COMMUNITY
Start: 2017-12-07 | End: 2018-09-04

## 2018-02-08 RX ORDER — BUPROPION HYDROCHLORIDE 300 MG/1
300 TABLET ORAL EVERY MORNING
COMMUNITY
Start: 2017-03-13 | End: 2018-03-15

## 2018-02-08 RX ORDER — ALPRAZOLAM 0.25 MG
0.25 TABLET ORAL 2 TIMES DAILY PRN
COMMUNITY
Start: 2017-02-02 | End: 2018-09-04

## 2018-02-08 RX ORDER — PYRIDOXINE HCL (VITAMIN B6) 25 MG
25 TABLET ORAL 3 TIMES DAILY PRN
COMMUNITY
Start: 2017-10-17 | End: 2018-02-15

## 2018-02-08 RX ORDER — POLYETHYLENE GLYCOL 3350 17 G
2 POWDER IN PACKET (EA) ORAL
COMMUNITY
Start: 2017-10-17 | End: 2018-02-15

## 2018-02-08 RX ORDER — TRIAMCINOLONE ACETONIDE 55 UG/1
2 SPRAY, METERED NASAL DAILY
COMMUNITY
Start: 2017-03-13 | End: 2018-03-15

## 2018-02-08 RX ORDER — POLYETHYLENE GLYCOL 3350 17 G/17G
17 POWDER, FOR SOLUTION ORAL
COMMUNITY
Start: 2016-12-01 | End: 2018-09-04

## 2018-02-09 ENCOUNTER — DOCUMENTATION ONLY (OUTPATIENT)
Dept: FAMILY MEDICINE | Facility: OTHER | Age: 33
End: 2018-02-09

## 2018-02-09 VITALS
SYSTOLIC BLOOD PRESSURE: 108 MMHG | WEIGHT: 127 LBS | HEART RATE: 82 BPM | BODY MASS INDEX: 24.8 KG/M2 | DIASTOLIC BLOOD PRESSURE: 68 MMHG

## 2018-02-09 VITALS
BODY MASS INDEX: 25.78 KG/M2 | SYSTOLIC BLOOD PRESSURE: 112 MMHG | DIASTOLIC BLOOD PRESSURE: 60 MMHG | WEIGHT: 132 LBS | HEART RATE: 80 BPM

## 2018-02-10 ASSESSMENT — ANXIETY QUESTIONNAIRES: GAD7 TOTAL SCORE: 8

## 2018-02-11 ASSESSMENT — PATIENT HEALTH QUESTIONNAIRE - PHQ9: SUM OF ALL RESPONSES TO PHQ QUESTIONS 1-9: 0

## 2018-02-11 ASSESSMENT — ANXIETY QUESTIONNAIRES: GAD7 TOTAL SCORE: 4

## 2018-02-12 NOTE — NURSING NOTE
Patient Information     Patient Name MRN Sex Sofya Platt [id number] Female 1985      Nursing Note by Prerna Fraser at 2017  3:30 PM     Author:  Prerna Fraser Service:  (none) Author Type:  (none)     Filed:  2017  3:46 PM Encounter Date:  2017 Status:  Signed     :  Prerna Fraser            Pt presents for prenatal care.  Prerna Fraser

## 2018-02-12 NOTE — TELEPHONE ENCOUNTER
Patient Information     Patient Name MRN Sex Sofya Platt [id number] Female 1985      Telephone Encounter by Naif Aguilar RN at 2017  9:42 AM     Author:  Naif Aguilar RN Service:  (none) Author Type:  NURS- Registered Nurse     Filed:  2017  9:44 AM Encounter Date:  2017 Status:  Signed     :  Naif Aguilar RN (NURS- Registered Nurse)            Spoke with Latoya at [facility] and they did not receive the RX for Hydroxyzine that was rx 17 by OB provider . rx resent today as RX by MD. Prescription refilled per RN Medication Refill Policy.................... NAIF AGUILAR RN ....................  2017   9:44 AM

## 2018-02-12 NOTE — PROGRESS NOTES
Patient Information     Patient Name MRN Sofya Morrison [id number] Female 1985      Progress Notes by Claudia Morfin MD at 2017  3:30 PM     Author:  Claudia Morfin MD Service:  (none) Author Type:  Physician     Filed:  2017  4:07 PM Encounter Date:  2017 Status:  Signed     :  Claudia Morfin MD (Physician)            [facility]  Return OB Visit    S: Patient reports she has been feeling okay. Has frequent headaches that start in the back of her neck and radiate up into her head and cheeks. She had frequent migraines prior to pregnancy as well. Knows some of her triggers are stress. She denies cramping, contractions, vaginal bleeding, leaking fluid. No FM yet. Mood has been good, has not required vistaril yet for anxiety but has it available if needed.    O: /68 (Cuff Site: Right Arm, Position: Sitting, Cuff Size: Adult Regular)  Pulse 82  Wt 57.6 kg (127 lb)  LMP 2017 (Exact Date) Comment: positive pg test  BMI 24.8 kg/m2  Gen: Well-appearing, NAD    Fundal Height:  U-2  FHR: 145    A/P:  Sofya Wise is a 32 y.o.  at 17w2d by LMP c/w 6w2d US, here for return OB visit.  Anxiety, depression: continue wellbutrin. Mood stable. Hydroxyzine prn.  Headaches: encouraged stress reduction techniques and identification of triggers. Tylenol prn.  Constipation: miralax prn     PNC:   - Prenatal labs reviewed, Rh positive, Rubella immune  - Genetics: declines  - Imaging: dating US at 6w2d. Anatomy US ord'd  - Immunizations: s/p flu.   RTC 4 weeks    Claudia Morfin MD  OB/GYN  2017 4:04 PM

## 2018-02-12 NOTE — PROGRESS NOTES
Patient Information     Patient Name MRN Sex Sofya Platt [id number] Female 1985      Progress Notes by Barb Sibley R.T. ([facility]) at 2018  4:30 PM     Author:  Barb Sibley R.T. ([facility]) Service:  (none) Author Type:  [employer] - Registered Radiologic Technologist     Filed:  2018  5:33 PM Date of Service:  2018  4:30 PM Status:  Signed     :  Barb Sibley R.T. ([facility]) ([facility] - Registered Radiologic Technologist)            Falls Risk Criteria:    Age 65 and older or under age 4        Sensory deficits    Poor vision    Use of ambulatory aides    Impaired judgment    Unable to walk independently    Meets High Risk criteria for falls:  no

## 2018-02-13 NOTE — NURSING NOTE
Patient Information     Patient Name MRN Sofya Morrison [id number] Female 1985      Nursing Note by Shireen Ambriz at 2018  4:00 PM     Author:  Shireen Ambriz Service:  (none) Author Type:  (none)     Filed:  2018  4:14 PM Encounter Date:  2018 Status:  Signed     :  Shireen Ambriz            2 Babystep coupons given.  Shireen Ambriz LPN  2018  4:02 PM

## 2018-02-13 NOTE — PROGRESS NOTES
Patient Information     Patient Name MRN Sex Sofya Platt [id number] Female 1985      Progress Notes by Claudia Morfin MD at 2018  4:00 PM     Author:  Claudia Morfin MD  Service:  (none) Author Type:  Physician     Filed:  2018  4:12 PM  Encounter Date:  2018 Status:  Addendum     :  Claudia Morfin MD (Physician)        Related Notes: Original Note by Claudia Morfin MD (Physician) filed at 2018  4:38 PM            [facility]  Return OB Visit    S: Patient reports she has been feeling well. Occasional RLP. She denies cramping, contractions, vaginal bleeding, leaking fluid. She reports fetal movement for the past week. She is also concerned about a persistent facial lesion. She first thought it was just acne, but it has been present for over 6 months and is not going away.    O: /60 (Cuff Site: Right Arm, Position: Sitting, Cuff Size: Adult Regular)  Pulse 80  Wt 59.9 kg (132 lb)  LMP 2017 (Exact Date) Comment: positive pg test  BMI 25.78 kg/m2  Gen: Well-appearing, NAD  Face: papular lesion on left side of nose with some surrounding telangiectasia     Fundal Height:  22  FHR: 140    A/P:  Sofya Wise is a 32 y.o.  at 21w2d by LMP c/w 6w2d US, here for return OB visit.  Anxiety, depression: continue wellbutrin. Mood stable. Hydroxyzine prn.  Headaches: encouraged stress reduction techniques and identification of triggers. Tylenol prn.  Constipation: miralax prn  Facial lesion: referral to dermatology      PNC:   - Prenatal labs reviewed, Rh positive, Rubella immune  - Genetics: declines  - Imaging: dating US at 6w2d. Anatomy US normal except limited cardiac visits. Repeat in 4 weeks  - Immunizations: s/p flu.   RTC 4 weeks; repeat US, GCT, CBC, syphilis next visit    Claudia Morfin MD  OB/GYN  2018 4:36 PM

## 2018-02-15 ENCOUNTER — HOSPITAL ENCOUNTER (OUTPATIENT)
Dept: ULTRASOUND IMAGING | Facility: OTHER | Age: 33
Discharge: HOME | End: 2018-02-15
Attending: OBSTETRICS & GYNECOLOGY | Admitting: OBSTETRICS & GYNECOLOGY
Payer: COMMERCIAL

## 2018-02-15 ENCOUNTER — PRENATAL OFFICE VISIT (OUTPATIENT)
Dept: OBGYN | Facility: OTHER | Age: 33
End: 2018-02-15
Attending: FAMILY MEDICINE
Payer: COMMERCIAL

## 2018-02-15 VITALS
WEIGHT: 139 LBS | DIASTOLIC BLOOD PRESSURE: 74 MMHG | SYSTOLIC BLOOD PRESSURE: 128 MMHG | HEART RATE: 84 BPM | BODY MASS INDEX: 27.15 KG/M2

## 2018-02-15 DIAGNOSIS — K21.9 GASTROESOPHAGEAL REFLUX DISEASE WITHOUT ESOPHAGITIS: ICD-10-CM

## 2018-02-15 DIAGNOSIS — Z34.02 ENCOUNTER FOR SUPERVISION OF NORMAL FIRST PREGNANCY IN SECOND TRIMESTER: ICD-10-CM

## 2018-02-15 DIAGNOSIS — Z34.02 ENCOUNTER FOR SUPERVISION OF NORMAL FIRST PREGNANCY IN SECOND TRIMESTER: Primary | ICD-10-CM

## 2018-02-15 DIAGNOSIS — F41.9 ANXIETY: ICD-10-CM

## 2018-02-15 DIAGNOSIS — J30.2 CHRONIC SEASONAL ALLERGIC RHINITIS, UNSPECIFIED TRIGGER: ICD-10-CM

## 2018-02-15 PROCEDURE — 76815 OB US LIMITED FETUS(S): CPT

## 2018-02-15 PROCEDURE — 99207 ZZC PRENATAL VISIT: CPT | Performed by: OBSTETRICS & GYNECOLOGY

## 2018-02-15 RX ORDER — LORATADINE 10 MG/1
1 TABLET ORAL DAILY
Refills: 0 | COMMUNITY
Start: 2018-01-15 | End: 2018-02-15

## 2018-02-15 RX ORDER — LORATADINE 10 MG/1
1 TABLET ORAL DAILY
Qty: 90 TABLET | Refills: 3 | Status: SHIPPED | OUTPATIENT
Start: 2018-02-15 | End: 2019-03-25

## 2018-02-15 RX ORDER — TRIAMCINOLONE ACETONIDE 55 UG/1
2 SPRAY, METERED NASAL DAILY
Qty: 1 BOTTLE | Refills: 1 | Status: SHIPPED | OUTPATIENT
Start: 2018-02-15 | End: 2018-09-04

## 2018-02-15 RX ORDER — BUPROPION HYDROCHLORIDE 300 MG/1
300 TABLET ORAL EVERY MORNING
Qty: 90 TABLET | Refills: 3 | Status: SHIPPED | OUTPATIENT
Start: 2018-02-15 | End: 2019-03-25

## 2018-02-15 ASSESSMENT — ANXIETY QUESTIONNAIRES
3. WORRYING TOO MUCH ABOUT DIFFERENT THINGS: NOT AT ALL
GAD7 TOTAL SCORE: 0
2. NOT BEING ABLE TO STOP OR CONTROL WORRYING: NOT AT ALL
1. FEELING NERVOUS, ANXIOUS, OR ON EDGE: NOT AT ALL
6. BECOMING EASILY ANNOYED OR IRRITABLE: NOT AT ALL
7. FEELING AFRAID AS IF SOMETHING AWFUL MIGHT HAPPEN: NOT AT ALL
5. BEING SO RESTLESS THAT IT IS HARD TO SIT STILL: NOT AT ALL

## 2018-02-15 ASSESSMENT — PAIN SCALES - GENERAL: PAINLEVEL: NO PAIN (0)

## 2018-02-15 ASSESSMENT — PATIENT HEALTH QUESTIONNAIRE - PHQ9: 5. POOR APPETITE OR OVEREATING: NOT AT ALL

## 2018-02-15 NOTE — NURSING NOTE
Patient presents today for her 25w4d prenatal check.  Shireen Ambriz LPN  2/15/2018  4:18 PM

## 2018-02-15 NOTE — PROGRESS NOTES
Return OB Visit    S: Patient reports she is feeling well. Has occasional lower abdominal tightening/cramping pain, especially when she urinates or has been sitting for a prolonged time. She denies dysuria or urine color changes or odor.  No VB or LOF. +FM. Had repeat US today, was originally told she was having a boy but today was told she is having a girl.    O: /74 (BP Location: Right arm, Patient Position: Sitting, Cuff Size: Adult Regular)  Pulse 84  Wt 63 kg (139 lb)  LMP 2017  BMI 27.15 kg/m2  Gen: Well-appearing, NAD  See OB Flowsheet    A/P:  Sofya Wise is a 32 year old  at 25w4d by LMP c/w 6w2d US, here for return OB visit.  Anxiety, depression: continue wellbutrin. Mood stable. Hydroxyzine prn.  Headaches: encouraged stress reduction techniques and identification of triggers. Tylenol prn.  Constipation: miralax prn  Facial lesion: referral to dermatology      PNC:   - Prenatal labs reviewed, Rh positive, Rubella immune  - Genetics: declines  - Imaging: dating US at 6w2d. Anatomy US normal except limited cardiac visits. Repeat pending today  - Immunizations: s/p flu.   RTC 4 weeks. Will return next week for GCT, CBC, syphilis screen. Needs Tdap next visit    Claudia Morfin MD  OB/GYN  2/15/2018 4:48 PM

## 2018-02-15 NOTE — MR AVS SNAPSHOT
After Visit Summary   2/15/2018    Sofya Wise    MRN: [id number]           Patient Information     Date Of Birth          1985        Visit Information        Provider Department      2/15/2018 4:00 PM Claudia Morfin MD [facility]        Today's Diagnoses     Encounter for supervision of normal first pregnancy in second trimester    -  1    Gastroesophageal reflux disease without esophagitis        Chronic seasonal allergic rhinitis, unspecified trigger        Anxiety           Follow-ups after your visit        Your next 10 appointments already scheduled     Feb 22, 2018  3:45 PM CST   LAB with GH LAB   [facility] ([facility])    [address] [facility] [address]   [phone]           Please do not eat 10-12 hours before your appointment if you are coming in fasting for labs on lipids, cholesterol, or glucose (sugar). This does not apply to pregnant women. Water, hot tea and black coffee (with nothing added) are okay. Do not drink other fluids, diet soda or chew gum.            Mar 15, 2018  4:00 PM CDT   ESTABLISHED PRENATAL with Claudia Morfin MD   [facility] ([facility])    [address]  [place] [address]   [phone]            Apr 12, 2018  4:00 PM CDT   ESTABLISHED PRENATAL with Claudia Morfin MD   [facility] ([facility])    [address]  [place] [address]   [phone]            Apr 26, 2018  4:00 PM CDT   ESTABLISHED PRENATAL with Claudia Morfin MD   [facility] ([facility])    [address]  [place] [address]   [phone]            May 03, 2018  4:00 PM CDT   ESTABLISHED PRENATAL with Claudia Morfin MD   [facility] ([facility])    [address]  [place] [address]   [phone]            May 10, 2018  4:00 PM CDT   ESTABLISHED PRENATAL with Claudia Morfin MD   [facility] ([facility])    [address]  [place] [address]   [phone]            May 17, 2018  4:00 PM CDT   ESTABLISHED PRENATAL with Claudia Morfin MD   [facility] ([facility])    [address]  [place] [address]   [phone]            May 24, 2018  4:00 PM CDT   ESTABLISHED PRENATAL with Claudia Morfin MD   [facility] ([facility])    [address]  [place] [address]   [phone]              Future tests that were ordered for you today     Open Future Orders        Priority Expected Expires Ordered    Glucose tolerance, gest screen, 1 hour Routine  2/15/2019 2/15/2018    CBC with platelets Routine  2/15/2019 2/15/2018    ANTI-TREPONEMA EIA W/REFLEX Routine  2/15/2019 2/15/2018            Who to contact     If you have questions or need follow up information about today's clinic visit or your schedule please contact [facility] directly at [phone].  Normal or non-critical lab and imaging results will be communicated to you by Filement[employer], letter or phone within 4 business days after the clinic has received the results. If you do not hear from us within 7 days, please contact the clinic through [employer] or phone. If you have a critical or abnormal lab result, we will notify you by phone as soon as possible.  Submit refill requests through [employer] or call your pharmacy and they will forward the refill request to us. Please allow 3 business days for your refill to be completed.          Additional Information About Your Visit        [employer] Information     [employer] lets you send messages to your doctor, view your test results, renew your prescriptions, schedule appointments  "and more. To sign up, go to [email or web address] . Click on \"Log in\" on the left side of the screen, which will take you to the Welcome page. Then click on \"Sign up Now\" on the right side of the page.     You will be asked to enter the access code listed below, as well as some personal information. Please follow the directions to create your username and password.     Your access code is: [email or web address]  Expires: 2018  4:47 PM     Your access code will  in 90 days. If you need help or a new code, please call your [place] clinic or [phone].        Care EveryWhere ID     This is your Care EveryWhere ID. This could be used by other organizations to access your [place] medical records  [id number]        Your Vitals Were     Pulse Last Period BMI (Body Mass Index)             84 2017 27.15 kg/m2          Blood Pressure from Last 3 Encounters:   02/15/18 128/74   18 112/60   17 108/68    Weight from Last 3 Encounters:   02/15/18 63 kg (139 lb)   18 59.9 kg (132 lb)   17 57.6 kg (127 lb)                 Today's Medication Changes          These changes are accurate as of 2/15/18  4:47 PM.  If you have any questions, ask your nurse or doctor.               These medicines have changed or have updated prescriptions.        Dose/Directions    * buPROPion 300 MG 24 hr tablet   Commonly known as:  WELLBUTRIN XL   This may have changed:  Another medication with the same name was added. Make sure you understand how and when to take each.   Changed by:  Claudia Morfin MD        Dose:  300 mg   Take 300 mg by mouth every morning   Refills:  0       * buPROPion 300 MG 24 hr tablet   Commonly known as:  WELLBUTRIN XL   This may have changed:  You were already taking a medication with the same name, and this prescription was added. Make sure you understand how and when to take each.   Used for:  Anxiety   Changed by:  Claudia Morfin MD        Dose:  300 mg   Take 1 tablet " (300 mg) by mouth every morning   Quantity:  90 tablet   Refills:  3       * triamcinolone 55 MCG/ACT Inhaler   Commonly known as:  NASACORT   This may have changed:  Another medication with the same name was added. Make sure you understand how and when to take each.   Changed by:  Claudia Morfin MD        Dose:  2 spray   Spray 2 sprays in nostril daily   Refills:  0       * triamcinolone 55 MCG/ACT Inhaler   Commonly known as:  NASACORT AQ   This may have changed:  You were already taking a medication with the same name, and this prescription was added. Make sure you understand how and when to take each.   Used for:  Chronic seasonal allergic rhinitis, unspecified trigger   Changed by:  Claudia Morfin MD        Dose:  2 spray   Spray 2 sprays into both nostrils daily   Quantity:  1 Bottle   Refills:  1       * Notice:  This list has 4 medication(s) that are the same as other medications prescribed for you. Read the directions carefully, and ask your doctor or other care provider to review them with you.         Where to get your medicines      These medications were sent to [id number] - [place], [place] - [address]  [address], [facility] [address]     Phone:  [phone]     buPROPion 300 MG 24 hr tablet    loratadine 10 MG tablet    omeprazole 20 MG CR capsule    triamcinolone 55 MCG/ACT Inhaler                Primary Care Provider Office Phone # Fax #    Brenden Morillo [phone]       [address] [facility] [address]        Equal Access to Services     [facility]: Hadii aad ku hadasho Soomaali, waaxda luqadaha, qaybta kaalmada adeegyada, waxay chrissie haymelissa wu. So [facility] [phone].    ATENCIÓN: Si habla español, tiene a remy disposición servicios gratuitos de asistencia lingüística. Llame al [phone].    We comply with applicable federal civil rights laws and [place] laws. We do not  discriminate on the basis of race, color, national origin, age, disability, sex, sexual orientation, or gender identity.            Thank you!     Thank you for choosing [facility] AND [facility]  for your care. Our goal is always to provide you with excellent care. Hearing back from our patients is one way we can continue to improve our services. Please take a few minutes to complete the written survey that you may receive in the mail after your visit with us. Thank you!             Your Updated Medication List - Protect others around you: Learn how to safely use, store and throw away your medicines at www.disposemymeds.org.          This list is accurate as of 2/15/18  4:47 PM.  Always use your most recent med list.                   Brand Name Dispense Instructions for use Diagnosis    ALPRAZolam 0.25 MG tablet    XANAX     Take 0.25 mg by mouth 2 times daily as needed for anxiety        * buPROPion 300 MG 24 hr tablet    WELLBUTRIN XL     Take 300 mg by mouth every morning        * buPROPion 300 MG 24 hr tablet    WELLBUTRIN XL    90 tablet    Take 1 tablet (300 mg) by mouth every morning    Anxiety       hydrOXYzine 25 MG tablet    ATARAX     Take 25 mg by mouth 4 times daily as needed for anxiety        loratadine 10 MG tablet    CLARITIN    90 tablet    Take 1 tablet (10 mg) by mouth daily    Gastroesophageal reflux disease without esophagitis, Chronic seasonal allergic rhinitis, unspecified trigger       omeprazole 20 MG CR capsule    priLOSEC    90 capsule    Take 1 capsule (20 mg) by mouth daily    Gastroesophageal reflux disease without esophagitis       polyethylene glycol powder    MIRALAX/GLYCOLAX     Take 17 g by mouth        * triamcinolone 55 MCG/ACT Inhaler    NASACORT     Spray 2 sprays in nostril daily        * triamcinolone 55 MCG/ACT Inhaler    NASACORT AQ    1 Bottle    Spray 2 sprays into both nostrils daily    Chronic seasonal allergic rhinitis, unspecified trigger       * Notice:   This list has 4 medication(s) that are the same as other medications prescribed for you. Read the directions carefully, and ask your doctor or other care provider to review them with you.

## 2018-02-16 ASSESSMENT — ANXIETY QUESTIONNAIRES: GAD7 TOTAL SCORE: 0

## 2018-02-16 ASSESSMENT — PATIENT HEALTH QUESTIONNAIRE - PHQ9: SUM OF ALL RESPONSES TO PHQ QUESTIONS 1-9: 0

## 2018-02-22 DIAGNOSIS — Z34.02 ENCOUNTER FOR SUPERVISION OF NORMAL FIRST PREGNANCY IN SECOND TRIMESTER: ICD-10-CM

## 2018-02-22 LAB
ERYTHROCYTE [DISTWIDTH] IN BLOOD BY AUTOMATED COUNT: 12.7 % (ref 10–15)
GLUCOSE 1H P 50 G GLC PO SERPL-MCNC: 130 MG/DL (ref 60–129)
HCT VFR BLD AUTO: 33.2 % (ref 35–47)
HGB BLD-MCNC: 11.2 G/DL (ref 11.7–15.7)
MCH RBC QN AUTO: 30 PG (ref 26.5–33)
MCHC RBC AUTO-ENTMCNC: 33.7 G/DL (ref 31.5–36.5)
MCV RBC AUTO: 89 FL (ref 78–100)
PLATELET # BLD AUTO: 256 10E9/L (ref 150–450)
RBC # BLD AUTO: 3.73 10E12/L (ref 3.8–5.2)
WBC # BLD AUTO: 11.6 10E9/L (ref 4–11)

## 2018-02-22 PROCEDURE — 82950 GLUCOSE TEST: CPT | Performed by: OBSTETRICS & GYNECOLOGY

## 2018-02-22 PROCEDURE — 36415 COLL VENOUS BLD VENIPUNCTURE: CPT | Performed by: OBSTETRICS & GYNECOLOGY

## 2018-02-22 PROCEDURE — 85027 COMPLETE CBC AUTOMATED: CPT | Performed by: OBSTETRICS & GYNECOLOGY

## 2018-02-22 PROCEDURE — 86780 TREPONEMA PALLIDUM: CPT | Performed by: OBSTETRICS & GYNECOLOGY

## 2018-02-24 LAB — T PALLIDUM IGG+IGM SER QL: NEGATIVE

## 2018-03-15 ENCOUNTER — PRENATAL OFFICE VISIT (OUTPATIENT)
Dept: OBGYN | Facility: OTHER | Age: 33
End: 2018-03-15
Attending: OBSTETRICS & GYNECOLOGY
Payer: COMMERCIAL

## 2018-03-15 VITALS
SYSTOLIC BLOOD PRESSURE: 124 MMHG | BODY MASS INDEX: 28.51 KG/M2 | HEART RATE: 84 BPM | WEIGHT: 146 LBS | DIASTOLIC BLOOD PRESSURE: 76 MMHG

## 2018-03-15 DIAGNOSIS — Z34.03 ENCOUNTER FOR SUPERVISION OF NORMAL FIRST PREGNANCY IN THIRD TRIMESTER: Primary | ICD-10-CM

## 2018-03-15 PROCEDURE — 99207 ZZC PRENATAL VISIT: CPT | Performed by: OBSTETRICS & GYNECOLOGY

## 2018-03-15 ASSESSMENT — ANXIETY QUESTIONNAIRES
7. FEELING AFRAID AS IF SOMETHING AWFUL MIGHT HAPPEN: NOT AT ALL
GAD7 TOTAL SCORE: 3
2. NOT BEING ABLE TO STOP OR CONTROL WORRYING: NOT AT ALL
1. FEELING NERVOUS, ANXIOUS, OR ON EDGE: SEVERAL DAYS
6. BECOMING EASILY ANNOYED OR IRRITABLE: SEVERAL DAYS
3. WORRYING TOO MUCH ABOUT DIFFERENT THINGS: SEVERAL DAYS
5. BEING SO RESTLESS THAT IT IS HARD TO SIT STILL: NOT AT ALL

## 2018-03-15 ASSESSMENT — PAIN SCALES - GENERAL: PAINLEVEL: NO PAIN (0)

## 2018-03-15 ASSESSMENT — PATIENT HEALTH QUESTIONNAIRE - PHQ9: 5. POOR APPETITE OR OVEREATING: NOT AT ALL

## 2018-03-15 NOTE — NURSING NOTE
Patient presents today for her prenatal check-up. She is currently 29w4d.  1 Babystep coupon given.  Shireen Ambriz LPN  3/15/2018  3:55 PM

## 2018-03-15 NOTE — PROGRESS NOTES
Return OB Visit    S: Patient has been feeling well. Denies ctx, VB, LOF. +FM.     O: /76 (BP Location: Right arm, Patient Position: Sitting, Cuff Size: Adult Regular)  Pulse 84  Wt 66.2 kg (146 lb)  LMP 2017  BMI 28.51 kg/m2  Gen: Well-appearing, NAD  See OB Flowsheet    A/P:  Sofya Wise is a 32 year old  at 29w4d by LMP c/w 6w2d US, here for return OB visit.  Anxiety, depression: continue wellbutrin. Mood stable. Hydroxyzine prn.  Headaches: encouraged stress reduction techniques and identification of triggers. Tylenol prn.  Constipation: miralax prn  Facial lesion: spider angioma, plans treatment postpartum      PNC:   - Prenatal labs reviewed, Rh positive, Rubella immune.   - Genetics: declines  - Imaging: dating US at 6w2d. Anatomy US normal except limited cardiac visits. Follow up normal  - Immunizations: s/p flu. Developed seizures after pertussis vaccine as a child therefore Tdap deferred  RTC 2 weeks.     Claudia Morfin MD  OB/GYN  3/15/2018 4:00 PM

## 2018-03-15 NOTE — MR AVS SNAPSHOT
After Visit Summary   3/15/2018    Sofya Wise    MRN: [id number]           Patient Information     Date Of Birth          1985        Visit Information        Provider Department      3/15/2018 4:00 PM Claudia Morfin MD [facility]        Today's Diagnoses     Encounter for supervision of normal first pregnancy in third trimester    -  1       Follow-ups after your visit        Your next 10 appointments already scheduled     Mar 30, 2018  3:15 PM CDT   ESTABLISHED PRENATAL with Claudia Morfin MD   [facility]    [address]  [place] [address]   [phone]            Apr 12, 2018  1:30 PM CDT   ESTABLISHED PRENATAL with Claudia Morfin MD   [facility]    [address]  [place] [address]   [phone]            Apr 27, 2018  3:45 PM CDT   ESTABLISHED PRENATAL with Claudia Morfin MD   [facility]    [address]  [place] [address]   [phone]            May 04, 2018  3:30 PM CDT   ESTABLISHED PRENATAL with Claudia Morfin MD   [facility] ([facility])    [address]  [place] [address]   [phone]            May 09, 2018  3:45 PM CDT   ESTABLISHED PRENATAL with Claudia Morfin MD   [facility] ([facility])    [address]  [place] [address]   [phone]            May 16, 2018  4:00 PM CDT   ESTABLISHED PRENATAL with Claudia Morfin MD   [facility]    [address]  [place] [address]   [phone]            May 23, 2018  4:00 PM CDT   ESTABLISHED PRENATAL with Claudia Morfin MD   [facility]    [address]  [place] [address] [phone]              Who to contact     If you have questions or need follow up information about today's clinic visit or your schedule please contact [facility] directly at [phone].  Normal or non-critical lab and imaging results will be communicated to you by MyChart, letter or phone within 4 business days after the clinic has received the results. If you do not hear from us within 7 days, please contact the clinic through [employer] / [email or web address] or phone. If you have a critical or abnormal lab result, we will notify you by phone as soon as possible.  Submit refill requests through [employer] or call your pharmacy and they will forward the refill request to us. Please allow 3 business days for your refill to be completed.          Additional Information About Your Visit        MyChart Information     [employer] gives you secure access to your electronic health record. If you see a primary care provider, you can also send messages to your care team and make appointments. If you have questions, please call your primary care clinic.  If you do not have a primary care provider, please call [phone] and they will assist you.        Care EveryWhere ID     This is your Care EveryWhere ID. This could be used by other organizations to access your [facility] medical records  [id number]        Your Vitals Were     Pulse Last Period BMI (Body Mass Index)             84 08/20/2017 28.51 kg/m2          Blood Pressure from Last 3 Encounters:   03/15/18 124/76   02/15/18 128/74   01/16/18 112/60    Weight from Last 3 Encounters:   03/15/18 66.2 kg (146 lb)   02/15/18 63 kg (139 lb)   01/16/18 59.9 kg (132 lb)              Today, you had the following     No orders found for display       Primary Care Provider Office Phone # Fax #    Brenden Morillo [phone]       [address] [place] [address]        Equal Access to  Services     [facility]: Hadii aad ku hadwolfparminder Ivy, waaugustoda luqadaha, qaybta kaalmada jia, steph singer . So [facility] [phone].    ATENCIÓN: Si habla zak, tiene a remy disposición servicios gratuitos de asistencia lingüística. Llame al [phone].    We comply with applicable federal civil rights laws and [place] laws. We do not discriminate on the basis of race, color, national origin, age, disability, sex, sexual orientation, or gender identity.            Thank you!     Thank you for choosing [facility] AND [facility]  for your care. Our goal is always to provide you with excellent care. Hearing back from our patients is one way we can continue to improve our services. Please take a few minutes to complete the written survey that you may receive in the mail after your visit with us. Thank you!             Your Updated Medication List - Protect others around you: Learn how to safely use, store and throw away your medicines at www.disposemymeds.org.          This list is accurate as of 3/15/18  4:11 PM.  Always use your most recent med list.                   Brand Name Dispense Instructions for use Diagnosis    ALPRAZolam 0.25 MG tablet    XANAX     Take 0.25 mg by mouth 2 times daily as needed for anxiety        buPROPion 300 MG 24 hr tablet    WELLBUTRIN XL    90 tablet    Take 1 tablet (300 mg) by mouth every morning    Anxiety       hydrOXYzine 25 MG tablet    ATARAX     Take 25 mg by mouth 4 times daily as needed for anxiety        loratadine 10 MG tablet    CLARITIN    90 tablet    Take 1 tablet (10 mg) by mouth daily    Gastroesophageal reflux disease without esophagitis, Chronic seasonal allergic rhinitis, unspecified trigger       omeprazole 20 MG CR capsule    priLOSEC    90 capsule    Take 1 capsule (20 mg) by mouth daily    Gastroesophageal reflux disease without esophagitis       polyethylene glycol powder    MIRALAX/GLYCOLAX     Take 17 g by mouth         triamcinolone 55 MCG/ACT Inhaler    NASACORT AQ    1 Bottle    Spray 2 sprays into both nostrils daily    Chronic seasonal allergic rhinitis, unspecified trigger

## 2018-03-16 ASSESSMENT — PATIENT HEALTH QUESTIONNAIRE - PHQ9: SUM OF ALL RESPONSES TO PHQ QUESTIONS 1-9: 0

## 2018-03-16 ASSESSMENT — ANXIETY QUESTIONNAIRES: GAD7 TOTAL SCORE: 3

## 2018-03-30 ENCOUNTER — PRENATAL OFFICE VISIT (OUTPATIENT)
Dept: OBGYN | Facility: OTHER | Age: 33
End: 2018-03-30
Attending: OBSTETRICS & GYNECOLOGY
Payer: COMMERCIAL

## 2018-03-30 VITALS
BODY MASS INDEX: 29.8 KG/M2 | SYSTOLIC BLOOD PRESSURE: 130 MMHG | DIASTOLIC BLOOD PRESSURE: 78 MMHG | WEIGHT: 152.6 LBS | HEART RATE: 80 BPM

## 2018-03-30 DIAGNOSIS — Z34.03 ENCOUNTER FOR SUPERVISION OF NORMAL FIRST PREGNANCY IN THIRD TRIMESTER: Primary | ICD-10-CM

## 2018-03-30 PROCEDURE — 99207 ZZC OB VISIT-NO CHARGE - GICH ONLY: CPT | Performed by: OBSTETRICS & GYNECOLOGY

## 2018-03-30 RX ORDER — BREAST PUMP
1 EACH MISCELLANEOUS
Qty: 1 EACH | Refills: 0 | Status: SHIPPED | OUTPATIENT
Start: 2018-03-30 | End: 2018-03-30

## 2018-03-30 RX ORDER — BREAST PUMP
EACH MISCELLANEOUS
Qty: 1 EACH | Refills: 0 | Status: SHIPPED | OUTPATIENT
Start: 2018-03-30 | End: 2018-09-04

## 2018-03-30 RX ORDER — BREAST PUMP
EACH MISCELLANEOUS
Qty: 1 EACH | Refills: 0 | Status: SHIPPED | OUTPATIENT
Start: 2018-03-30 | End: 2018-03-30

## 2018-03-30 NOTE — NURSING NOTE
Patient here for prenatal care.  Maribell Hernadez............................... 3/30/2018 3:25 PM

## 2018-03-30 NOTE — MR AVS SNAPSHOT
After Visit Summary   3/30/2018    Sofya Wise    MRN: [id number]           Patient Information     Date Of Birth          1985        Visit Information        Provider Department      3/30/2018 3:15 PM Claudia Morfin MD [facility]        Today's Diagnoses     Encounter for supervision of normal first pregnancy in third trimester    -  1       Follow-ups after your visit        Your next 10 appointments already scheduled     Apr 12, 2018  1:30 PM CDT   ESTABLISHED PRENATAL with Claudia Morfin MD   [facility] ([facility])    [address]  [place] [address]   [phone]            Apr 27, 2018  3:45 PM CDT   ESTABLISHED PRENATAL with Claudia Morfin MD   [facility] ([facility])    [address]  [place] [address]   [phone]            May 04, 2018  3:30 PM CDT   ESTABLISHED PRENATAL with Claudia Morfin MD   [facility] ([facility])    [address]  [place] [address]   [phone]            May 09, 2018  3:45 PM CDT   ESTABLISHED PRENATAL with Claudia Morfin MD   [facility] ([facility]    [address]  [place] [address]   [phone]            May 16, 2018  4:00 PM CDT   ESTABLISHED PRENATAL with Claudia Morfin MD   [facility] ([facility])    [address]  [place] [address]   [phone]            May 23, 2018  4:00 PM CDT   ESTABLISHED PRENATAL with Claudia Morfin MD   [facility] ([facility])    [address]  [place] [address]   [phone]              Who to contact     If you have questions or need follow up information about today's clinic visit or your schedule please contact  [facility] directly at [phone].  Normal or non-critical lab and imaging results will be communicated to you by MyChart, letter or phone within 4 business days after the clinic has received the results. If you do not hear from us within 7 days, please contact the clinic through [employer] or phone. If you have a critical or abnormal lab result, we will notify you by phone as soon as possible.  Submit refill requests through [employer] or call your pharmacy and they will forward the refill request to us. Please allow 3 business days for your refill to be completed.          Additional Information About Your Visit        [employer] [facility] [employer] Information     [employer] gives you secure access to your electronic health record. If you see a primary care provider, you can also send messages to your care team and make appointments. If you have questions, please call your primary care clinic.  If you do not have a primary care provider, please call [phone] and they will assist you.        Care EveryWhere ID     This is your Care EveryWhere ID. This could be used by other organizations to access your [place] medical records  [id number]        Your Vitals Were     Pulse Last Period BMI (Body Mass Index)             80 08/20/2017 29.8 kg/m2          Blood Pressure from Last 3 Encounters:   03/30/18 130/78   03/15/18 124/76   02/15/18 128/74    Weight from Last 3 Encounters:   03/30/18 69.2 kg (152 lb 9.6 oz)   03/15/18 66.2 kg (146 lb)   02/15/18 63 kg (139 lb)              Today, you had the following     No orders found for display         Today's Medication Changes          These changes are accurate as of 3/30/18  3:45 PM.  If you have any questions, ask your nurse or doctor.               Start taking these medicines.        Dose/Directions    * breast pump Misc   Used for:  Encounter for supervision of normal first pregnancy in third trimester   Started by:  Claudia Morfin MD        For Home use. Length  of need 1 year.   Quantity:  1 each   Refills:  0       * breast pump Misc   Used for:  Encounter for supervision of normal first pregnancy in third trimester   Started by:  Claudia Morfin MD        Reason for need: breastfeeding. Duration of use: 1 year   Quantity:  1 each   Refills:  0       * Notice:  This list has 2 medication(s) that are the same as other medications prescribed for you. Read the directions carefully, and ask your doctor or other care provider to review them with you.         Where to get your medicines      These medications were sent to [id number] - [place], [place] - [address]  [address], [facility] [address]     Phone:  [phone]     breast pump Misc         Some of these will need a paper prescription and others can be bought over the counter.  Ask your nurse if you have questions.     Bring a paper prescription for each of these medications     breast pump Misc                Primary Care Provider Office Phone # Fax #    Brenden Morillo [phone]       [address]  [facility] [address]        Equal Access to Services     [facility]: Hadii aad ku hadasho Soomaali, waaxda luqadaha, qaybta kaalmada adeegyada, steph singer . So [facility] [phone].    ATENCIÓN: Si habla español, tiene a remy disposición servicios gratuitos de asistencia lingüística. Llame al [phone].    We comply with applicable federal civil rights laws and [place] laws. We do not discriminate on the basis of race, color, national origin, age, disability, sex, sexual orientation, or gender identity.            Thank you!     Thank you for choosing [facility]  for your care. Our goal is always to provide you with excellent care. Hearing back from our patients is one way we can continue to improve our services. Please take a few minutes to complete the written survey that you may  receive in the mail after your visit with us. Thank you!             Your Updated Medication List - Protect others around you: Learn how to safely use, store and throw away your medicines at www.disposemymeds.org.          This list is accurate as of 3/30/18  3:45 PM.  Always use your most recent med list.                   Brand Name Dispense Instructions for use Diagnosis    ALPRAZolam 0.25 MG tablet    XANAX     Take 0.25 mg by mouth 2 times daily as needed for anxiety        * breast pump Misc     1 each    For Home use. Length of need 1 year.    Encounter for supervision of normal first pregnancy in third trimester       * breast pump Misc     1 each    Reason for need: breastfeeding. Duration of use: 1 year    Encounter for supervision of normal first pregnancy in third trimester       buPROPion 300 MG 24 hr tablet    WELLBUTRIN XL    90 tablet    Take 1 tablet (300 mg) by mouth every morning    Anxiety       hydrOXYzine 25 MG tablet    ATARAX     Take 25 mg by mouth 4 times daily as needed for anxiety        loratadine 10 MG tablet    CLARITIN    90 tablet    Take 1 tablet (10 mg) by mouth daily    Gastroesophageal reflux disease without esophagitis, Chronic seasonal allergic rhinitis, unspecified trigger       omeprazole 20 MG CR capsule    priLOSEC    90 capsule    Take 1 capsule (20 mg) by mouth daily    Gastroesophageal reflux disease without esophagitis       polyethylene glycol powder    MIRALAX/GLYCOLAX     Take 17 g by mouth        triamcinolone 55 MCG/ACT Inhaler    NASACORT AQ    1 Bottle    Spray 2 sprays into both nostrils daily    Chronic seasonal allergic rhinitis, unspecified trigger       * Notice:  This list has 2 medication(s) that are the same as other medications prescribed for you. Read the directions carefully, and ask your doctor or other care provider to review them with you.

## 2018-03-30 NOTE — PROGRESS NOTES
Return OB Visit    S: Patient reports she has been feeling well. Has a few ctx per day. No VB or LOF. +FM    O: /78 (BP Location: Right arm, Patient Position: Sitting, Cuff Size: Adult Regular)  Pulse 80  Wt 69.2 kg (152 lb 9.6 oz)  LMP 2017  BMI 29.8 kg/m2  Gen: Well-appearing, NAD  See OB Flowsheet    A/P:  Sofya Wise is a 32 year old  at 31w5d by LMP c/w 6w2d US, here for return OB visit.  Anxiety, depression: continue wellbutrin. Mood stable. Hydroxyzine prn.  Headaches: encouraged stress reduction techniques and identification of triggers. Tylenol prn.  Constipation: miralax prn  Facial lesion: spider angioma, plans treatment postpartum  Plans breastfeeding, Rx 3/30/2018   Plans epidural, Mirena      PNC:   - Prenatal labs reviewed, Rh positive, Rubella immune.   - Genetics: declines  - Imaging: dating US at 6w2d. Anatomy US normal except limited cardiac visits. Follow up normal  - Immunizations: s/p flu. Developed seizures after pertussis vaccine as a child therefore Tdap deferred  RTC 2 weeks.    Claudia Morfin MD  OB/GYN  3/30/2018 3:25 PM

## 2018-04-08 ENCOUNTER — HOSPITAL ENCOUNTER (OUTPATIENT)
Facility: OTHER | Age: 33
Discharge: HOME | End: 2018-04-08
Attending: OBSTETRICS & GYNECOLOGY | Admitting: OBSTETRICS & GYNECOLOGY
Payer: COMMERCIAL

## 2018-04-08 VITALS
DIASTOLIC BLOOD PRESSURE: 75 MMHG | TEMPERATURE: 98 F | OXYGEN SATURATION: 100 % | HEART RATE: 81 BPM | RESPIRATION RATE: 18 BRPM | SYSTOLIC BLOOD PRESSURE: 132 MMHG

## 2018-04-08 PROBLEM — Z36.89 ENCOUNTER FOR TRIAGE IN PREGNANT PATIENT: Status: ACTIVE | Noted: 2018-04-08

## 2018-04-08 LAB
A1 MICROGLOB PLACENTAL VAG QL: NEGATIVE
ALBUMIN UR-MCNC: NEGATIVE MG/DL
APPEARANCE UR: CLEAR
BILIRUB UR QL STRIP: NEGATIVE
COLOR UR AUTO: YELLOW
GLUCOSE UR STRIP-MCNC: NEGATIVE MG/DL
HGB UR QL STRIP: NEGATIVE
KETONES UR STRIP-MCNC: NEGATIVE MG/DL
LEUKOCYTE ESTERASE UR QL STRIP: NEGATIVE
NITRATE UR QL: NEGATIVE
PH UR STRIP: 7 PH (ref 5–7)
SOURCE: NORMAL
SP GR UR STRIP: 1.01 (ref 1–1.03)
UROBILINOGEN UR STRIP-ACNC: 0.2 EU/DL (ref 0.2–1)

## 2018-04-08 PROCEDURE — 81003 URINALYSIS AUTO W/O SCOPE: CPT | Performed by: OBSTETRICS & GYNECOLOGY

## 2018-04-08 PROCEDURE — G0463 HOSPITAL OUTPT CLINIC VISIT: HCPCS | Mod: 25

## 2018-04-08 PROCEDURE — 84112 EVAL AMNIOTIC FLUID PROTEIN: CPT | Performed by: OBSTETRICS & GYNECOLOGY

## 2018-04-08 PROCEDURE — G0463 HOSPITAL OUTPT CLINIC VISIT: HCPCS

## 2018-04-08 NOTE — IP AVS SNAPSHOT
MRN:[id number]                      After Visit Summary   4/8/2018    Sofya Wise    MRN: [id number]           Thank you!     Thank you for choosing [facility] for your care. Our goal is always to provide you with excellent care. Hearing back from our patients is one way we can continue to improve our services. Please take a few minutes to complete the written survey that you may receive in the mail after you visit with us. Thank you!        Patient Information     Date Of Birth          1985        About your hospital stay     You were admitted on:  April 8, 2018 You last received care in the:  [facility]    You were discharged on:  April 8, 2018       Who to Call     For medical emergencies, please call 911.  For non-urgent questions about your medical care, please call your primary care provider or clinic, [phone]          Attending Provider     Provider Specialty    Claudia Morfin MD OB/Gyn    Pato Antunez MD OB/Gyn       Primary Care Provider Office Phone # Fax #    Brenden Morillo [phone]      Your next 10 appointments already scheduled     Apr 12, 2018  1:30 PM CDT   ESTABLISHED PRENATAL with Claudia Morfin MD   [facility]    [address]  [place] [address]   [phone]            Apr 27, 2018  3:45 PM CDT   ESTABLISHED PRENATAL with Claudia Morfin MD   [facility]    [address]  [place] [address]   [phone]            May 04, 2018  3:30 PM CDT   ESTABLISHED PRENATAL with Claudia Morfin MD   [facility]    [address]  [place] [address]   [phone]            May 09, 2018  3:45 PM CDT   ESTABLISHED PRENATAL with Claudia Morfin MD   [facility]    [address]  [place] [address]   [phone]            May 16, 2018  4:00 PM CDT   ESTABLISHED PRENATAL with Claudia Morfin MD   [facility] ([facility]    [address]  [place] [address]   [phone]            May 23, 2018  4:00 PM CDT   ESTABLISHED PRENATAL with Claudia Morfin MD   [facility] ([facility])    [address]  [place] [address]   [phone]              Further instructions from your care team       Follow up with Dr Morfin at scheduled appt this week.  Call or return to OB with any questions or concerns: If abdominal/uterine pain increases in intensity, bright red vaginal bleeding, rupture of membranes, or decreased fetal movement  Drink plenty of water  Tylenol as needed for pain      Pending Results     No orders found from 4/6/2018 to 4/9/2018.            Admission Information     Date & Time Provider Department Dept. Phone    4/8/2018 Pato Antunez MD [facility] [phone]      Your Vitals Were     Blood Pressure Pulse Temperature Respirations Last Period Pulse Oximetry    168/90 83 98  F (36.7  C) (Temporal) 18 08/20/2017 98%      MyChart Information     [employer] gives you secure access to your electronic health record. If you see a primary care provider, you can also send messages to your care team and make appointments. If you have questions, please call your primary care clinic.  If you do not have a primary care provider, please call [phone] and they will assist you.        Care EveryWhere ID     This is your Care EveryWhere ID. This could be used by other organizations to access your [place] medical records  [id number]        Equal Access to Services     HALEY ZACARIAS : Saeid Ivy, adrianna sanchez, steph asif. So [facility]  [phone].    ATENCIÓN: Si jaxsonla zak, tiene a remy disposición servicios gratuitos de asistencia lingüística. Phil champion [phone].    We comply with applicable federal civil rights laws and [place] laws. We do not discriminate on the basis of race, color, national origin, age, disability, sex, sexual orientation, or gender identity.               Review of your medicines      UNREVIEWED medicines. Ask your doctor about these medicines        Dose / Directions    ALPRAZolam 0.25 MG tablet   Commonly known as:  XANAX        Dose:  0.25 mg   Take 0.25 mg by mouth 2 times daily as needed for anxiety   Refills:  0       buPROPion 300 MG 24 hr tablet   Commonly known as:  WELLBUTRIN XL   Used for:  Anxiety        Dose:  300 mg   Take 1 tablet (300 mg) by mouth every morning   Quantity:  90 tablet   Refills:  3       hydrOXYzine 25 MG tablet   Commonly known as:  ATARAX        Dose:  25 mg   Take 25 mg by mouth 4 times daily as needed for anxiety   Refills:  0       loratadine 10 MG tablet   Commonly known as:  CLARITIN   Used for:  Gastroesophageal reflux disease without esophagitis, Chronic seasonal allergic rhinitis, unspecified trigger        Dose:  1 tablet   Take 1 tablet (10 mg) by mouth daily   Quantity:  90 tablet   Refills:  3       omeprazole 20 MG CR capsule   Commonly known as:  priLOSEC   Used for:  Gastroesophageal reflux disease without esophagitis        Dose:  20 mg   Take 1 capsule (20 mg) by mouth daily   Quantity:  90 capsule   Refills:  3       polyethylene glycol powder   Commonly known as:  MIRALAX/GLYCOLAX        Dose:  17 g   Take 17 g by mouth   Refills:  0       triamcinolone 55 MCG/ACT Inhaler   Commonly known as:  NASACORT AQ   Used for:  Chronic seasonal allergic rhinitis, unspecified trigger        Dose:  2 spray   Spray 2 sprays into both nostrils daily   Quantity:  1 Bottle   Refills:  1         CONTINUE these medicines which have NOT CHANGED        Dose / Directions    * breast  pump Misc   Used for:  Encounter for supervision of normal first pregnancy in third trimester        For Home use. Length of need 1 year.   Quantity:  1 each   Refills:  0       * breast pump Misc   Used for:  Encounter for supervision of normal first pregnancy in third trimester        Reason for need: breastfeeding. Duration of use: 1 year   Quantity:  1 each   Refills:  0       * Notice:  This list has 2 medication(s) that are the same as other medications prescribed for you. Read the directions carefully, and ask your doctor or other care provider to review them with you.             Protect others around you: Learn how to safely use, store and throw away your medicines at www.disposemymeds.org.             Medication List: This is a list of all your medications and when to take them. Check marks below indicate your daily home schedule. Keep this list as a reference.      Medications           Morning Afternoon Evening Bedtime As Needed    ALPRAZolam 0.25 MG tablet   Commonly known as:  XANAX   Take 0.25 mg by mouth 2 times daily as needed for anxiety                                * breast pump Misc   For Home use. Length of need 1 year.                                * breast pump Misc   Reason for need: breastfeeding. Duration of use: 1 year                                buPROPion 300 MG 24 hr tablet   Commonly known as:  WELLBUTRIN XL   Take 1 tablet (300 mg) by mouth every morning                                hydrOXYzine 25 MG tablet   Commonly known as:  ATARAX   Take 25 mg by mouth 4 times daily as needed for anxiety                                loratadine 10 MG tablet   Commonly known as:  CLARITIN   Take 1 tablet (10 mg) by mouth daily                                omeprazole 20 MG CR capsule   Commonly known as:  priLOSEC   Take 1 capsule (20 mg) by mouth daily                                polyethylene glycol powder   Commonly known as:  MIRALAX/GLYCOLAX   Take 17 g by mouth                                 triamcinolone 55 MCG/ACT Inhaler   Commonly known as:  NASACORT AQ   [place] 2 sprays into both nostrils daily                                * Notice:  This list has 2 medication(s) that are the same as other medications prescribed for you. Read the directions carefully, and ask your doctor or other care provider to review them with you.

## 2018-04-08 NOTE — IP AVS SNAPSHOT
[facility]    [address]    [place] [address]    Phone:  [phone]    Fax:  [phone]                                       After Visit Summary   4/8/2018    Sofya Wise    MRN: [id number]           After Visit Summary Signature Page     I have received my discharge instructions, and my questions have been answered. I have discussed any challenges I see with this plan with the nurse or doctor.    ..........................................................................................................................................  Patient/Patient Representative Signature      ..........................................................................................................................................  Patient Representative Print Name and Relationship to Patient    ..................................................               ................................................  Date                                            Time    ..........................................................................................................................................  Reviewed by Signature/Title    ...................................................              ..............................................  Date                                                            Time

## 2018-04-09 NOTE — DISCHARGE SUMMARY
Discharge instructions reviewed with patient. Handouts given to patient on  labor signs and symptoms. Reviewed preeclampsia signs and symptoms, pt denies headache, floaters, or epigastric pain. Fetal movement reviewed. Patient states understanding of discharge instructions and will follow up with Dr Morfin this week at her regularly scheduled appt, if not sooner. Patient discharged home per ambulation

## 2018-04-09 NOTE — DISCHARGE INSTRUCTIONS
Follow up with Dr Morfin at scheduled appt this week.  Call or return to OB with any questions or concerns: If abdominal/uterine pain increases in intensity, bright red vaginal bleeding, rupture of membranes, or decreased fetal movement  Drink plenty of water  Tylenol as needed for pain

## 2018-04-09 NOTE — PLAN OF CARE
32 year old  presents to Labor and delivery stating that she has been having vaginal discharge that started this pat . Patient states she has noted a pinkish/brown to a greyish colored liquid discharge when she wipes her perineum. Patient states that she has had a crampiness feeling in her left lower quad of her abd for the past few weeks to a month. On admit patient states that there was not any discharge noted on the toilet paper when she voided. External fetal monitor applied. Initially fetal variability was minimal, uterine irritability present. UA and amnisure collected

## 2018-04-09 NOTE — PROVIDER NOTIFICATION
Dr Antunez notified of patients reason for presenting to OB. Reported UA results, negative amnisure test, sterile vaginal exam and cervix being closed. Reported to Dr Antunez that when I performed the SVE, I had a small amount of creamy white discharge. Patient denies vaginal itching. Discharge order received to discharge home.

## 2018-04-12 ENCOUNTER — PRENATAL OFFICE VISIT (OUTPATIENT)
Dept: OBGYN | Facility: OTHER | Age: 33
End: 2018-04-12
Attending: OBSTETRICS & GYNECOLOGY
Payer: COMMERCIAL

## 2018-04-12 VITALS
SYSTOLIC BLOOD PRESSURE: 126 MMHG | WEIGHT: 158.56 LBS | DIASTOLIC BLOOD PRESSURE: 80 MMHG | BODY MASS INDEX: 30.97 KG/M2 | HEART RATE: 88 BPM

## 2018-04-12 DIAGNOSIS — Z34.03 ENCOUNTER FOR SUPERVISION OF NORMAL FIRST PREGNANCY IN THIRD TRIMESTER: Primary | ICD-10-CM

## 2018-04-12 PROCEDURE — 99207 ZZC OB VISIT-NO CHARGE - GICH ONLY: CPT | Performed by: OBSTETRICS & GYNECOLOGY

## 2018-04-12 PROCEDURE — 59426 ANTEPARTUM CARE ONLY: CPT | Performed by: OBSTETRICS & GYNECOLOGY

## 2018-04-12 ASSESSMENT — ANXIETY QUESTIONNAIRES
GAD7 TOTAL SCORE: 0
3. WORRYING TOO MUCH ABOUT DIFFERENT THINGS: NOT AT ALL
2. NOT BEING ABLE TO STOP OR CONTROL WORRYING: NOT AT ALL
6. BECOMING EASILY ANNOYED OR IRRITABLE: NOT AT ALL
7. FEELING AFRAID AS IF SOMETHING AWFUL MIGHT HAPPEN: NOT AT ALL
5. BEING SO RESTLESS THAT IT IS HARD TO SIT STILL: NOT AT ALL
1. FEELING NERVOUS, ANXIOUS, OR ON EDGE: NOT AT ALL

## 2018-04-12 ASSESSMENT — PATIENT HEALTH QUESTIONNAIRE - PHQ9: 5. POOR APPETITE OR OVEREATING: NOT AT ALL

## 2018-04-12 ASSESSMENT — PAIN SCALES - GENERAL: PAINLEVEL: MODERATE PAIN (5)

## 2018-04-12 NOTE — MR AVS SNAPSHOT
After Visit Summary   4/12/2018    Sofya Wise    MRN: [id number]           Patient Information     Date Of Birth          1985        Visit Information        Provider Department      4/12/2018 1:30 PM Claudia Morfin MD [facility]        Today's Diagnoses     Encounter for supervision of normal first pregnancy in third trimester    -  1       Follow-ups after your visit        Your next 10 appointments already scheduled     Apr 27, 2018  3:45 PM CDT   ESTABLISHED PRENATAL with Claudia Morfin MD   [facility]    [address]  [place] [address]   [phone]            May 04, 2018  3:30 PM CDT   ESTABLISHED PRENATAL with Claudia Morfin MD   [facility]    [address]  [place] [address]   [phone]            May 09, 2018  3:45 PM CDT   ESTABLISHED PRENATAL with Claudia Morfin MD   [facility]    [address]  [place] [address]   [phone]            May 16, 2018  4:00 PM CDT   ESTABLISHED PRENATAL with Claudia Morfin MD   [facility]    [address]  [place] [address]   [phone]            May 23, 2018  4:00 PM CDT   ESTABLISHED PRENATAL with Claudia Morfin MD   [facility]    [address]  [place] [address]   [phone]              Who to contact     If you have questions or need follow up information about today's clinic visit or your schedule please contact [facility] [place] [facility] directly at [phone].  Normal or non-critical lab and imaging results will be communicated to you by MyChart, letter or phone within 4 business days after the clinic has received the  results. If you do not hear from us within 7 days, please contact the clinic through [employer] or phone. If you have a critical or abnormal lab result, we will notify you by phone as soon as possible.  Submit refill requests through [employer] or call your pharmacy and they will forward the refill request to us. Please allow 3 business days for your refill to be completed.          Additional Information About Your Visit        Xactly CorphariSkoot Information     [employer] gives you secure access to your electronic health record. If you see a primary care provider, you can also send messages to your care team and make appointments. If you have questions, please call your primary care clinic.  If you do not have a primary care provider, please call [phone] and they will assist you.        Care EveryWhere ID     This is your Care EveryWhere ID. This could be used by other organizations to access your [place] medical records  [id number]        Your Vitals Were     Pulse Last Period BMI (Body Mass Index)             88 08/20/2017 30.97 kg/m2          Blood Pressure from Last 3 Encounters:   04/12/18 126/80   04/08/18 132/75   03/30/18 130/78    Weight from Last 3 Encounters:   04/12/18 71.9 kg (158 lb 9 oz)   03/30/18 69.2 kg (152 lb 9.6 oz)   03/15/18 66.2 kg (146 lb)              Today, you had the following     No orders found for display       Primary Care Provider Office Phone # Fax #    Brenden Morillo [phone] [address] [facility] [address]        Equal Access to Services     [facility] AH: Hadii aad ku hadasho Soomaali, waaxda luqadaha, qaybta kaalmada adeegyada, steph singer . So [facility] [phone].    ATENCIÓN: Si habla zak, tiene a remy disposición servicios gratuitos de asistencia lingüística. Llame al [phone].    We comply with applicable federal civil rights laws and [place] laws. We do not discriminate on the basis of race, color, national  origin, age, disability, sex, sexual orientation, or gender identity.            Thank you!     Thank you for choosing [facility] AND [facility]  for your care. Our goal is always to provide you with excellent care. Hearing back from our patients is one way we can continue to improve our services. Please take a few minutes to complete the written survey that you may receive in the mail after your visit with us. Thank you!             Your Updated Medication List - Protect others around you: Learn how to safely use, store and throw away your medicines at www.disposemymeds.org.          This list is accurate as of 4/12/18  3:43 PM.  Always use your most recent med list.                   Brand Name Dispense Instructions for use Diagnosis    ALPRAZolam 0.25 MG tablet    XANAX     Take 0.25 mg by mouth 2 times daily as needed for anxiety        * breast pump Misc     1 each    For Home use. Length of need 1 year.    Encounter for supervision of normal first pregnancy in third trimester       * breast pump Misc     1 each    Reason for need: breastfeeding. Duration of use: 1 year    Encounter for supervision of normal first pregnancy in third trimester       buPROPion 300 MG 24 hr tablet    WELLBUTRIN XL    90 tablet    Take 1 tablet (300 mg) by mouth every morning    Anxiety       hydrOXYzine 25 MG tablet    ATARAX     Take 25 mg by mouth 4 times daily as needed for anxiety        loratadine 10 MG tablet    CLARITIN    90 tablet    Take 1 tablet (10 mg) by mouth daily    Gastroesophageal reflux disease without esophagitis, Chronic seasonal allergic rhinitis, unspecified trigger       omeprazole 20 MG CR capsule    priLOSEC    90 capsule    Take 1 capsule (20 mg) by mouth daily    Gastroesophageal reflux disease without esophagitis       polyethylene glycol powder    MIRALAX/GLYCOLAX     Take 17 g by mouth        triamcinolone 55 MCG/ACT Inhaler    NASACORT AQ    1 Bottle    Spray 2 sprays into both nostrils daily     Chronic seasonal allergic rhinitis, unspecified trigger       * Notice:  This list has 2 medication(s) that are the same as other medications prescribed for you. Read the directions carefully, and ask your doctor or other care provider to review them with you.

## 2018-04-12 NOTE — PROGRESS NOTES
Return OB Visit    S: Patient reports she is getting uncomfortable with more swelling in her legs and feet. She is trying to keep them elevated at work. No headaches, vision changes, chest pain, difficulty breathing, RUQ pain. No ctx, VB or LOF. +FM.    O: /80 (BP Location: Right arm, Patient Position: Sitting, Cuff Size: Adult Regular)  Pulse 88  Wt 71.9 kg (158 lb 9 oz)  LMP 2017  BMI 30.97 kg/m2  Gen: Well-appearing, NAD  See OB Flowsheet    A/P:  Sofya Wise is a 32 year old  at 33w4d by LMP c/w 6w2d US, here for return OB visit.  Anxiety, depression: continue wellbutrin. Mood stable. Hydroxyzine prn.  Constipation: miralax prn  Facial lesion: spider angioma, plans treatment postpartum  Plans breastfeeding, Rx 3/30/2018   Plans epidural, Mirena      PNC:   - Prenatal labs reviewed, Rh positive, Rubella immune.   - Genetics: declines  - Imaging: dating US at 6w2d. Anatomy US normal except limited cardiac visits. Follow up normal  - Immunizations: s/p flu. Developed seizures after pertussis vaccine as a child therefore Tdap deferred  RTC 2 weeks.    Claudia Morfin MD  OB/GYN  2018 3:40 PM

## 2018-04-12 NOTE — NURSING NOTE
Patient presents today for her prenatal check-up. She is currently 33w4d.  She is having some edema in her feet and legs.  Shireen Ambriz LPN  4/12/2018  1:41 PM

## 2018-04-13 ASSESSMENT — ANXIETY QUESTIONNAIRES: GAD7 TOTAL SCORE: 0

## 2018-04-13 ASSESSMENT — PATIENT HEALTH QUESTIONNAIRE - PHQ9: SUM OF ALL RESPONSES TO PHQ QUESTIONS 1-9: 1

## 2018-04-27 ENCOUNTER — TELEPHONE (OUTPATIENT)
Dept: OBGYN | Facility: OTHER | Age: 33
End: 2018-04-27

## 2018-04-27 ENCOUNTER — HOSPITAL ENCOUNTER (OUTPATIENT)
Facility: OTHER | Age: 33
Discharge: ANOTHER HOSPITAL | End: 2018-04-27
Attending: OBSTETRICS & GYNECOLOGY | Admitting: OBSTETRICS & GYNECOLOGY
Payer: COMMERCIAL

## 2018-04-27 VITALS
HEART RATE: 92 BPM | DIASTOLIC BLOOD PRESSURE: 78 MMHG | RESPIRATION RATE: 16 BRPM | TEMPERATURE: 98 F | SYSTOLIC BLOOD PRESSURE: 142 MMHG

## 2018-04-27 LAB — A1 MICROGLOB PLACENTAL VAG QL: POSITIVE

## 2018-04-27 PROCEDURE — G0463 HOSPITAL OUTPT CLINIC VISIT: HCPCS

## 2018-04-27 PROCEDURE — G0463 HOSPITAL OUTPT CLINIC VISIT: HCPCS | Mod: 25

## 2018-04-27 PROCEDURE — 96372 THER/PROPH/DIAG INJ SC/IM: CPT | Mod: XU

## 2018-04-27 PROCEDURE — 96374 THER/PROPH/DIAG INJ IV PUSH: CPT

## 2018-04-27 PROCEDURE — 25000128 H RX IP 250 OP 636: Performed by: OBSTETRICS & GYNECOLOGY

## 2018-04-27 PROCEDURE — 84112 EVAL AMNIOTIC FLUID PROTEIN: CPT | Performed by: OBSTETRICS & GYNECOLOGY

## 2018-04-27 RX ORDER — SODIUM CHLORIDE 9 MG/ML
INJECTION, SOLUTION INTRAVENOUS CONTINUOUS
Status: DISCONTINUED | OUTPATIENT
Start: 2018-04-27 | End: 2018-04-27 | Stop reason: HOSPADM

## 2018-04-27 RX ORDER — AMPICILLIN 2 G/1
2 INJECTION, POWDER, FOR SOLUTION INTRAVENOUS ONCE
Status: COMPLETED | OUTPATIENT
Start: 2018-04-27 | End: 2018-04-27

## 2018-04-27 RX ORDER — BETAMETHASONE SODIUM PHOSPHATE AND BETAMETHASONE ACETATE 3; 3 MG/ML; MG/ML
12 INJECTION, SUSPENSION INTRA-ARTICULAR; INTRALESIONAL; INTRAMUSCULAR; SOFT TISSUE ONCE
Status: COMPLETED | OUTPATIENT
Start: 2018-04-27 | End: 2018-04-27

## 2018-04-27 RX ADMIN — AMPICILLIN SODIUM 2 G: 2 INJECTION, POWDER, FOR SOLUTION INTRAMUSCULAR; INTRAVENOUS at 11:00

## 2018-04-27 RX ADMIN — SODIUM CHLORIDE 1000 ML: 900 INJECTION, SOLUTION INTRAVENOUS at 10:59

## 2018-04-27 RX ADMIN — BETAMETHASONE SODIUM PHOSPHATE AND BETAMETHASONE ACETATE 12 MG: 3; 3 INJECTION, SUSPENSION INTRA-ARTICULAR; INTRALESIONAL; INTRAMUSCULAR at 11:20

## 2018-04-27 NOTE — PROGRESS NOTES
FHT's Category 1, no uterine activity noted. Amnisure is positive, first dose of amp IV given and first dose of steroid IM given per Doctor order. Patient discharged to [facility]. She will be transferred to [facility]. See flowsheet for further info.

## 2018-04-27 NOTE — PROGRESS NOTES
Obstetrics Triage Note    HPI:  Sofya Wise is a 32 year old  female at 35w5d by LMP c/w 6w2d US, here with complaints of leaking fluid.      Patient states that she started feeling small gushes this morning. She has continued to have leakage. No VB or contractions.  + FM. She has otherwise been feeling well.    ROS:  Negative except as mentioned in HPI.    PMH:  Past Medical History:   Diagnosis Date     Dysplasia of cervix uteri     cryotherapy .     Family history of other specified conditions (CODE)     following Pertussis vaccination, last seizure .     Major depressive disorder, single episode     No Comments Provided     Migraine without status migrainosus, not intractable     No Comments Provided     Other specified postprocedural states     at [facility], abnormal pap smears times 2.  Two sexual partners in her left.       PSHx:  Past Surgical History:   Procedure Laterality Date     OTHER SURGICAL HISTORY      2 mo,,HERNIA REPAIR       Medications:    No current facility-administered medications on file prior to encounter.   Current Outpatient Prescriptions on File Prior to Encounter:  ALPRAZolam (XANAX) 0.25 MG tablet Take 0.25 mg by mouth 2 times daily as needed for anxiety   buPROPion (WELLBUTRIN XL) 300 MG 24 hr tablet Take 1 tablet (300 mg) by mouth every morning   hydrOXYzine (ATARAX) 25 MG tablet Take 25 mg by mouth 4 times daily as needed for anxiety   loratadine (CLARITIN) 10 MG tablet Take 1 tablet (10 mg) by mouth daily   Misc. Devices (BREAST PUMP) MISC For Home use. Length of need 1 year.   Misc. Devices (BREAST PUMP) MISC Reason for need: breastfeeding. Duration of use: 1 year   omeprazole (PRILOSEC) 20 MG CR capsule Take 1 capsule (20 mg) by mouth daily   polyethylene glycol (MIRALAX/GLYCOLAX) powder Take 17 g by mouth   triamcinolone (NASACORT AQ) 55 MCG/ACT Inhaler Spray 2 sprays into both nostrils daily        Allergies:     Allergies   Allergen  Reactions     Pertussis Vaccines      Other reaction(s): Seizures     Sertraline      Other reaction(s): Dizziness       Physical Exam:   There were no vitals filed for this visit.   Gen: resting comfortably, in NAD  Resp: nonlabored  Abd: soft, gravid, non-tender, non-distended  Cx: 1 per RN exam    Bedside US: cephalic presentation    NST:  FHT: , mod flroy, + accels, no decels  [place]: no ctx     Labs/Imaging:  Results for orders placed or performed during the hospital encounter of 18 (from the past 24 hour(s))   Rupture of membranes by Amnisure   Result Value Ref Range    Amnisure Positive (A) NEG^Negative       Assessment/Plan: Sofya Wise is a 32 year old  at 35w5d by LMP c/w 6w2d US, here for PPROM. Discussed diagnosis with patient. Due to anticipated delivery at <36 weeks, she was recommended to transfer to higher level of care for potential need of NICU services with a  infant. GBS collected and ppx started for GBS unknown. First dose of betamethasone given prior to transfer. Patient was accepted in transfer by Dr Armenta at [facility].     Claudia Morfin MD  OBGYN  2018 10:28 AM

## 2018-04-27 NOTE — TELEPHONE ENCOUNTER
Patient calls today to report she has had several small gushes of clear, odorless fluid. Does not think it is urine as her urine has not been clear while taking prenatal vitamins. Advised her to present to [facility] for evaluation. She is in agreement with plan. [facility] RN notified.    Ruth Valera RN...................4/27/2018 8:18 AM

## 2018-04-27 NOTE — PROGRESS NOTES
Amnisure collected large amounts of clear fluid noted. Will continue to monitor. Dr. Morfin notified and will be coming to see patient.

## 2018-04-27 NOTE — PROGRESS NOTES
32 year old  35 5/7 weeks to [facility] from home. She states that at 0600 she had a gush of clear fluid leaking from her vagina. EFM/EUM applied. Dr. Morfin is aware of patients status. Will collect an amnisure and continue to monitor.

## 2018-06-12 ENCOUNTER — PRENATAL OFFICE VISIT (OUTPATIENT)
Dept: OBGYN | Facility: OTHER | Age: 33
End: 2018-06-12
Attending: OBSTETRICS & GYNECOLOGY
Payer: COMMERCIAL

## 2018-06-12 VITALS
BODY MASS INDEX: 27.54 KG/M2 | HEART RATE: 84 BPM | SYSTOLIC BLOOD PRESSURE: 114 MMHG | DIASTOLIC BLOOD PRESSURE: 76 MMHG | WEIGHT: 141 LBS

## 2018-06-12 DIAGNOSIS — Z30.012 EMERGENCY CONTRACEPTION: ICD-10-CM

## 2018-06-12 LAB — HCG UR QL: NEGATIVE

## 2018-06-12 PROCEDURE — 81025 URINE PREGNANCY TEST: CPT | Performed by: OBSTETRICS & GYNECOLOGY

## 2018-06-12 PROCEDURE — 99207 ZZC POST PARTUM EXAM: CPT | Performed by: OBSTETRICS & GYNECOLOGY

## 2018-06-12 RX ORDER — LEVONORGESTREL 1.5 MG/1
1.5 TABLET ORAL ONCE
Qty: 1 TABLET | Refills: 0 | Status: SHIPPED | OUTPATIENT
Start: 2018-06-12 | End: 2018-08-25

## 2018-06-12 ASSESSMENT — PAIN SCALES - GENERAL: PAINLEVEL: NO PAIN (0)

## 2018-06-12 NOTE — PROGRESS NOTES
6 week Postpartum Visit Note    S:  Ms. Sofya Wise is a 32 year old  here for her 6-week postpartum checkup.   - Had a FAVD on 18 after PPROM  - Infant gender:  girl, weight 5 pounds 13 oz.  - Feeding Method:   and Bottlefed.  Complications reported with feeding:  none, infant thriving .    - Bleeding:  None.  Duration:  2 weeks.  Menses resumed:  No  - Bowel/Urinary problems:  No  - Mood: good  - Sleep: not great    - Contraception Planned:  Mirena IUD  - She  has had intercourse since delivery and experienced  No discomfort.  .  Did not use condoms.  - Current tobacco use:  No  - Hx of Abuse:  No  ================================================================  ROS: 10 point ROS neg other than the symptoms noted above in the HPI.     O:  /76 (BP Location: Right arm, Patient Position: Sitting, Cuff Size: Adult Large)  Pulse 84  Wt 64 kg (141 lb)  LMP 2017  Breastfeeding? Yes  BMI 27.54 kg/m2  Gen: Well-appearing, NAD  Psych:  Appropriate mood and affect  Breast: Declined, no concerns  Abd:  Benign, Soft, flat, non-tender, No masses, organomegaly and Diastasis less than 1-2 FB  Pelvic: Deferred- will do at next visit with IUD insertion      A/P:  Ms. Sofya Wise is a 32 year old  here for 6 week postpartum visit after FAVD. Doing well.  - Contraception: Mirena IUD. Had unprotected sex this week, including last night. Rx sent for Plan B per patient request. Will have her f/u in 2 weeks for insertion. Advised to use condoms  - Feeding: breast and bottle  - Follow-up: 2 weeks    Claudia Morfin MD  OB/GYN  2018 12:54 PM

## 2018-06-12 NOTE — MR AVS SNAPSHOT
After Visit Summary   6/12/2018    Sofya Wise    MRN: [id number]           Patient Information     Date Of Birth          1985        Visit Information        Provider Department      6/12/2018 12:45 PM Claudia Morfin MD [facility]        Today's Diagnoses     Encounter for insertion of intrauterine contraceptive device (IUD)    -  1    Emergency contraception           Follow-ups after your visit        Your next 10 appointments already scheduled     Jun 27, 2018  2:45 PM CDT   Office Visit with Claudia Morfin MD   [facility]    [address]  [place] [address]   [phone]           Bring a current list of meds and any records pertaining to this visit. For Physicals, please bring immunization records and any forms needing to be filled out. Please arrive 10 minutes early to complete paperwork.              Who to contact     If you have questions or need follow up information about today's clinic visit or your schedule please contact [facility] directly at [phone].  Normal or non-critical lab and imaging results will be communicated to you by Knginehart, letter or phone within 4 business days after the clinic has received the results. If you do not hear from us within 7 days, please contact the clinic through Go[employer] or phone. If you have a critical or abnormal lab result, we will notify you by phone as soon as possible.  Submit refill requests through [employer] or call your pharmacy and they will forward the refill request to us. Please allow 3 business days for your refill to be completed.          Additional Information About Your Visit        MyChart Information     [employer] gives you secure access to your electronic health record. If you see a primary care provider, you can also send messages to your care team and make appointments. If you have questions, please  call your primary care clinic.  If you do not have a primary care provider, please call [phone] and they will assist you.        Care EveryWhere ID     This is your Care EveryWhere ID. This could be used by other organizations to access your [facility] medical records  [id number]        Your Vitals Were     Pulse Last Period Breastfeeding? BMI (Body Mass Index)          84 08/20/2017 Yes 27.54 kg/m2         Blood Pressure from Last 3 Encounters:   06/12/18 114/76   04/27/18 142/78   04/12/18 126/80    Weight from Last 3 Encounters:   06/12/18 64 kg (141 lb)   04/12/18 71.9 kg (158 lb 9 oz)   03/30/18 69.2 kg (152 lb 9.6 oz)              We Performed the Following     HCG qualitative urine          Today's Medication Changes          These changes are accurate as of 6/12/18  1:14 PM.  If you have any questions, ask your nurse or doctor.               Start taking these medicines.        Dose/Directions    levonorgestrel 1.5 MG Tabs tablet   Commonly known as:  PLAN B   Used for:  Emergency contraception   Started by:  Claudia Morfin MD        Dose:  1.5 mg   Take 1 tablet (1.5 mg) by mouth once for 1 dose   Quantity:  1 tablet   Refills:  0            Where to get your medicines      These medications were sent to [facility] [address] [facility], [place] - [address]  [address], [facility] [address]     Phone:  [phone]     levonorgestrel 1.5 MG Tabs tablet                Primary Care Provider Fax #    Physician No Ref-Primary [phone]       No address on file        Equal Access to Services     [facility]: Hadii aad ku hadasho Socarlos, waaxda luqadaha, qaybta kaalmada steph ro. So [facility] [phone].    ATENCIÓN: Si habla español, tiene a remy disposición servicios gratuitos de asistencia lingüística. Llame al [phone].    We comply with applicable federal civil rights laws and [place] laws. We do not  discriminate on the basis of race, color, national origin, age, disability, sex, sexual orientation, or gender identity.            Thank you!     Thank you for choosing [facility] [place] AND [facility]  for your care. Our goal is always to provide you with excellent care. Hearing back from our patients is one way we can continue to improve our services. Please take a few minutes to complete the written survey that you may receive in the mail after your visit with us. Thank you!             Your Updated Medication List - Protect others around you: Learn how to safely use, store and throw away your medicines at www.disposemymeds.org.          This list is accurate as of 6/12/18  1:14 PM.  Always use your most recent med list.                   Brand Name Dispense Instructions for use Diagnosis    ALPRAZolam 0.25 MG tablet    XANAX     Take 0.25 mg by mouth 2 times daily as needed for anxiety        * breast pump Misc     1 each    For Home use. Length of need 1 year.    Encounter for supervision of normal first pregnancy in third trimester       * breast pump Misc     1 each    Reason for need: breastfeeding. Duration of use: 1 year    Encounter for supervision of normal first pregnancy in third trimester       buPROPion 300 MG 24 hr tablet    WELLBUTRIN XL    90 tablet    Take 1 tablet (300 mg) by mouth every morning    Anxiety       hydrOXYzine 25 MG tablet    ATARAX     Take 25 mg by mouth 4 times daily as needed for anxiety        levonorgestrel 1.5 MG Tabs tablet    PLAN B    1 tablet    Take 1 tablet (1.5 mg) by mouth once for 1 dose    Emergency contraception       loratadine 10 MG tablet    CLARITIN    90 tablet    Take 1 tablet (10 mg) by mouth daily    Gastroesophageal reflux disease without esophagitis, Chronic seasonal allergic rhinitis, unspecified trigger       omeprazole 20 MG CR capsule    priLOSEC    90 capsule    Take 1 capsule (20 mg) by mouth daily    Gastroesophageal reflux disease without  esophagitis       polyethylene glycol powder    MIRALAX/GLYCOLAX     Take 17 g by mouth        triamcinolone 55 MCG/ACT Inhaler    NASACORT AQ    1 Bottle    Spray 2 sprays into both nostrils daily    Chronic seasonal allergic rhinitis, unspecified trigger       * Notice:  This list has 2 medication(s) that are the same as other medications prescribed for you. Read the directions carefully, and ask your doctor or other care provider to review them with you.

## 2018-06-12 NOTE — NURSING NOTE
Patient presents today for her postpartum visit. She would like to have an IUD for contraceptive management.  Shireen Ambriz LPN  6/12/2018  12:53 PM

## 2018-06-20 ENCOUNTER — TELEPHONE (OUTPATIENT)
Dept: OTHER | Facility: CLINIC | Age: 33
End: 2018-06-20

## 2018-06-27 ENCOUNTER — OFFICE VISIT (OUTPATIENT)
Dept: OBGYN | Facility: OTHER | Age: 33
End: 2018-06-27
Attending: OBSTETRICS & GYNECOLOGY
Payer: COMMERCIAL

## 2018-06-27 VITALS
DIASTOLIC BLOOD PRESSURE: 78 MMHG | WEIGHT: 139.56 LBS | SYSTOLIC BLOOD PRESSURE: 122 MMHG | HEART RATE: 80 BPM | BODY MASS INDEX: 27.26 KG/M2

## 2018-06-27 DIAGNOSIS — Z30.430 ENCOUNTER FOR INSERTION OF INTRAUTERINE CONTRACEPTIVE DEVICE (IUD): Primary | ICD-10-CM

## 2018-06-27 LAB — HCG UR QL: NEGATIVE

## 2018-06-27 PROCEDURE — 58300 INSERT INTRAUTERINE DEVICE: CPT | Performed by: OBSTETRICS & GYNECOLOGY

## 2018-06-27 PROCEDURE — 25000125 ZZHC RX 250: Performed by: OBSTETRICS & GYNECOLOGY

## 2018-06-27 PROCEDURE — 81025 URINE PREGNANCY TEST: CPT | Performed by: OBSTETRICS & GYNECOLOGY

## 2018-06-27 RX ADMIN — LEVONORGESTREL 1 EACH: 52 INTRAUTERINE DEVICE INTRAUTERINE at 13:44

## 2018-06-27 ASSESSMENT — PAIN SCALES - GENERAL: PAINLEVEL: NO PAIN (0)

## 2018-06-27 NOTE — MR AVS SNAPSHOT
After Visit Summary   6/27/2018    Sofya Wise    MRN: [id number]           Patient Information     Date Of Birth          1985        Visit Information        Provider Department      6/27/2018 1:15 PM Claudia Morfin MD [facility]        Today's Diagnoses     Encounter for insertion of intrauterine contraceptive device (IUD)    -  1       Follow-ups after your visit        Who to contact     If you have questions or need follow up information about today's clinic visit or your schedule please contact [facility] directly at [phone].  Normal or non-critical lab and imaging results will be communicated to you by [employer], letter or phone within 4 business days after the clinic has received the results. If you do not hear from us within 7 days, please contact the clinic through [employer] or phone. If you have a critical or abnormal lab result, we will notify you by phone as soon as possible.  Submit refill requests through [employer] or call your pharmacy and they will forward the refill request to us. Please allow 3 business days for your refill to be completed.          Additional Information About Your Visit        MyChart Information     [employer] gives you secure access to your electronic health record. If you see a primary care provider, you can also send messages to your care team and make appointments. If you have questions, please call your primary care clinic.  If you do not have a primary care provider, please call [phone] and they will assist you.        Care EveryWhere ID     This is your Care EveryWhere ID. This could be used by other organizations to access your [facility] medical records  [id number]        Your Vitals Were     Pulse Last Period Breastfeeding? BMI (Body Mass Index)          80 06/21/2018 (Exact Date) No 27.26 kg/m2         Blood Pressure from Last 3 Encounters:   06/27/18 122/78   06/12/18 114/76   04/27/18 142/78     Weight from Last 3 Encounters:   06/27/18 63.3 kg (139 lb 9 oz)   06/12/18 64 kg (141 lb)   04/12/18 71.9 kg (158 lb 9 oz)              We Performed the Following     HCG qualitative urine     INSERTION INTRAUTERINE DEVICE        Primary Care Provider Fax #    Physician No Ref-Primary [phone]       No address on file        Equal Access to Services     [facility]OR[facility]AL[facility] : Hadii aad ku hadasho Soomaali, waaxda luqadaha, qaybta kaalmada adeegyada, steph yoderin indianan aderegina valdez laamericokelsey . So [facility] [phone].    ATENCIÓN: Si habla español, tiene a remy disposición servicios gratuitos de asistencia lingüística. Llame al [phone].    We comply with applicable federal civil rights laws and [place] laws. We do not discriminate on the basis of race, color, national origin, age, disability, sex, sexual orientation, or gender identity.            Thank you!     Thank you for choosing [facility]  for your care. Our goal is always to provide you with excellent care. Hearing back from our patients is one way we can continue to improve our services. Please take a few minutes to complete the written survey that you may receive in the mail after your visit with us. Thank you!             Your Updated Medication List - Protect others around you: Learn how to safely use, store and throw away your medicines at www.disposemymeds.org.          This list is accurate as of 6/27/18  1:55 PM.  Always use your most recent med list.                   Brand Name Dispense Instructions for use Diagnosis    ALPRAZolam 0.25 MG tablet    XANAX     Take 0.25 mg by mouth 2 times daily as needed for anxiety        * breast pump Misc     1 each    For Home use. Length of need 1 year.    Encounter for supervision of normal first pregnancy in third trimester       * breast pump Misc     1 each    Reason for need: breastfeeding. Duration of use: 1 year    Encounter for supervision of normal first pregnancy in third  trimester       buPROPion 300 MG 24 hr tablet    WELLBUTRIN XL    90 tablet    Take 1 tablet (300 mg) by mouth every morning    Anxiety       hydrOXYzine 25 MG tablet    ATARAX     Take 25 mg by mouth 4 times daily as needed for anxiety        levonorgestrel 1.5 MG Tabs tablet    PLAN B    1 tablet    Take 1 tablet (1.5 mg) by mouth once for 1 dose    Emergency contraception       loratadine 10 MG tablet    CLARITIN    90 tablet    Take 1 tablet (10 mg) by mouth daily    Gastroesophageal reflux disease without esophagitis, Chronic seasonal allergic rhinitis, unspecified trigger       omeprazole 20 MG CR capsule    priLOSEC    90 capsule    Take 1 capsule (20 mg) by mouth daily    Gastroesophageal reflux disease without esophagitis       polyethylene glycol powder    MIRALAX/GLYCOLAX     Take 17 g by mouth        triamcinolone 55 MCG/ACT Inhaler    NASACORT AQ    1 Bottle    Spray 2 sprays into both nostrils daily    Chronic seasonal allergic rhinitis, unspecified trigger       * Notice:  This list has 2 medication(s) that are the same as other medications prescribed for you. Read the directions carefully, and ask your doctor or other care provider to review them with you.

## 2018-06-27 NOTE — PROGRESS NOTES
SUBJECTIVE: Sofya Wise is a 32 year old  female, requests Mirena IUD insertion. No unprotected intercourse in the past two weeks. Previously had the Mirena and was satisfied.    Verification of Procedure:  Just before the procedure begans through verbal and active participation of team members, I verified:     Initials   Patient Name Sofya Wise    Patient  1985    Procedure to be performed IUD insertion     Consent:  Risks, benefits of treatment, and alternative options for contraception were discussed.  Patient's questions were elicited and answered.  Written consent was obtained and scanned into medical record.       OBJECTIVE: /78 (BP Location: Right arm, Patient Position: Sitting, Cuff Size: Adult Regular)  Pulse 80  Wt 63.3 kg (139 lb 9 oz)  LMP 2018 (Exact Date)  Breastfeeding? No  BMI 27.26 kg/m2    Pelvic Exam:  Normal external genitalia. Normal appearing vagina and cervix. Uterus small, mobile, anteverted.      PROCEDURE NOTE  --  Mirena Insertion    Reason for Insertion:  contraception.    Pregnancy test: Negative    Counseling:  Patient counseled on efficacy, benefits, risks, potential side effects of IUD.  Insertion procedure and risk of infection, perforation, and spontaneous expulsion reviewed.   Advised to plan removal and/or replacement of IUD in 5 years from today or when desired.      Under sterile technique, cervix was visualized with a medium Graves speculum and prepped with Betadine solution. The uterus was sounded to 7 cm. The Mirena IUD insertion apparatus was prepared and placed through the cervix without significant resistance and deployed at the fundus in the usual fashion. The strings were trimmed 3 cm from the external os.      Device Lot #: [id number]  Device Expiration Date: 2021     EBL:  Minimal     Complications: None      Sofya Wise tolerated procedure well.    PLAN:      Written information on IUD use reviewed and given.   Symptoms to report reviewed. To report heavy bleeding, severe cramping, or abnormal vaginal discharge.  May take Ibuprofen 400-800 mg PO TID PRN or Naproxen 500 mg PO BID for cramping. Reminded to check for IUD strings every month. Patient has been counseled to use backup birth control method for 1 week.  Return to clinic in 4-6 weeks for string check. Sofya Wise  verbalized understanding of instructions.    Claudia Morfin MD  OB/GYN  6/27/2018 1:37 PM

## 2018-06-27 NOTE — LETTER
2018       RE: Sofya Wise  [address]  [place] [address]     Dear Colleague,    Thank you for referring your patient, Sofya Wise, to the [facility] at [facility]. Please see a copy of my visit note below.    SUBJECTIVE: Sofya Wise is a 32 year old  female, requests Mirena IUD insertion. No unprotected intercourse in the past two weeks. Previously had the Mirena and was satisfied.    Verification of Procedure:  Just before the procedure begans through verbal and active participation of team members, I verified:     Initials   Patient Name Sofya Wise    Patient  1985    Procedure to be performed IUD insertion     Consent:  Risks, benefits of treatment, and alternative options for contraception were discussed.  Patient's questions were elicited and answered.  Written consent was obtained and scanned into medical record.       OBJECTIVE: /78 (BP Location: Right arm, Patient Position: Sitting, Cuff Size: Adult Regular)  Pulse 80  Wt 63.3 kg (139 lb 9 oz)  LMP 2018 (Exact Date)  Breastfeeding? No  BMI 27.26 kg/m2    Pelvic Exam:  Normal external genitalia. Normal appearing vagina and cervix. Uterus small, mobile, anteverted.      PROCEDURE NOTE  --  Mirena Insertion    Reason for Insertion:  contraception.    Pregnancy test: Negative    Counseling:  Patient counseled on efficacy, benefits, risks, potential side effects of IUD.  Insertion procedure and risk of infection, perforation, and spontaneous expulsion reviewed.   Advised to plan removal and/or replacement of IUD in 5 years from today or when desired.      Under sterile technique, cervix was visualized with a medium Graves speculum and prepped with Betadine solution. The uterus was sounded to 7 cm. The Mirena IUD insertion apparatus was prepared and placed through the cervix without significant resistance and deployed at the fundus  in the usual fashion. The strings were trimmed 3 cm from the external os.      Device Lot #: [id number]  Device Expiration Date: Jan 2021     EBL:  Minimal     Complications: None      Sofya Wise tolerated procedure well.    PLAN:      Written information on IUD use reviewed and given.  Symptoms to report reviewed. To report heavy bleeding, severe cramping, or abnormal vaginal discharge.  May take Ibuprofen 400-800 mg PO TID PRN or Naproxen 500 mg PO BID for cramping. Reminded to check for IUD strings every month. Patient has been counseled to use backup birth control method for 1 week.  Return to clinic in 4-6 weeks for string check. Sofya Wise  verbalized understanding of instructions.    Claudia Morfin MD  OB/GYN  6/27/2018 1:37 PM

## 2018-06-27 NOTE — NURSING NOTE
Patient presents today to have an IUD inserted.   Shireen Ambriz LPN  6/27/2018  1:20 PM             Prior to the start of the procedure and with procedural staff participation, I verbally confirmed the patient s identity using two indicators, relevant allergies, that the procedure was appropriate and matched the consent or emergent situation, and that the correct equipment/implants were available. Immediately prior to starting the procedure I conducted the Time Out with the procedural staff and re-confirmed the patient s name, procedure, and site/side. (The Joint Commission universal protocol was followed.)  Yes    Sedation (Moderate or Deep): None

## 2018-07-24 NOTE — PROGRESS NOTES
Patient Information     Patient Name  Sofya Wise MRN  [id number] Sex  Female   1985      Letter by Claudia Morfin MD at      Author:  Claudia Morfin MD Service:  (none) Author Type:  (none)    Filed:   Encounter Date:  10/24/2017 Status:  (Other)           Sofya Wise  [address] [address]          2017    Dear Ms. Wise:    Following are the tests completed during your last clinic visit.  The results of these tests are normal and require no further attention. Your next pap smear will be due in 5 years.    Pap smear  HPV testing    If you have any further questions or problems contact my office at  [phone]    Thank you,    Alexandria BROWN, RN  Registered Nurse for the OB/Gyn providers of [facility]     Dr Claudia Morfin MD

## 2018-07-24 NOTE — PROGRESS NOTES
Patient Information     Patient Name  Sofya Wise MRN  [id number] Sex  Female   1985      Letter by Chelle Queen PA-C at      Author:  Chelle Queen PA-C Service:  (none) Author Type:  (none)    Filed:   Date of Service:   Status:  (Other)         Sofya Wise  [address] [facility] [address]    2017      Dear Ms. Wise,      We've received the results back from the imaging.  Your results are normal. Please contact us at [phone] with any questions or concerns that you have.    I attached your results for your records.        Take Care,         Chelle Queen PA-C      Resulted Orders    XR CHEST 2 VIEWS PA AND LATERAL (Exam End: 2017  2:59 PM)     Narrative    XR CHEST 2 VIEWS PA AND LATERAL    HISTORY: 31 years Female Chest pressure    COMPARISON: None    TECHNIQUE: 2 views of the chest were obtained.    FINDINGS: Two views of the chest were obtained. Heart size and pulmonary vascularity are within normal limits, lungs are clear both views. No consolidating air space opacities are present.          IMPRESSION: Clear chest.    Electronically Signed By: Burke Espinoza M.D. on 2017 3:38 PM

## 2018-08-25 ENCOUNTER — HOSPITAL ENCOUNTER (EMERGENCY)
Facility: OTHER | Age: 33
Discharge: HOME | End: 2018-08-25
Attending: EMERGENCY MEDICINE | Admitting: EMERGENCY MEDICINE
Payer: COMMERCIAL

## 2018-08-25 VITALS
HEIGHT: 60 IN | TEMPERATURE: 97.8 F | BODY MASS INDEX: 27.48 KG/M2 | SYSTOLIC BLOOD PRESSURE: 125 MMHG | RESPIRATION RATE: 16 BRPM | DIASTOLIC BLOOD PRESSURE: 85 MMHG | WEIGHT: 140 LBS | HEART RATE: 80 BPM | OXYGEN SATURATION: 97 %

## 2018-08-25 DIAGNOSIS — G43.909 MIGRAINE WITHOUT STATUS MIGRAINOSUS, NOT INTRACTABLE, UNSPECIFIED MIGRAINE TYPE: ICD-10-CM

## 2018-08-25 LAB — HCG UR QL: NEGATIVE

## 2018-08-25 PROCEDURE — 81025 URINE PREGNANCY TEST: CPT | Performed by: EMERGENCY MEDICINE

## 2018-08-25 PROCEDURE — 96375 TX/PRO/DX INJ NEW DRUG ADDON: CPT | Performed by: EMERGENCY MEDICINE

## 2018-08-25 PROCEDURE — 99284 EMERGENCY DEPT VISIT MOD MDM: CPT | Mod: 25 | Performed by: EMERGENCY MEDICINE

## 2018-08-25 PROCEDURE — 96374 THER/PROPH/DIAG INJ IV PUSH: CPT | Performed by: EMERGENCY MEDICINE

## 2018-08-25 PROCEDURE — 25000128 H RX IP 250 OP 636: Performed by: EMERGENCY MEDICINE

## 2018-08-25 PROCEDURE — 99283 EMERGENCY DEPT VISIT LOW MDM: CPT | Mod: Z6 | Performed by: EMERGENCY MEDICINE

## 2018-08-25 RX ORDER — DIPHENHYDRAMINE HYDROCHLORIDE 50 MG/ML
25 INJECTION INTRAMUSCULAR; INTRAVENOUS ONCE
Status: COMPLETED | OUTPATIENT
Start: 2018-08-25 | End: 2018-08-25

## 2018-08-25 RX ORDER — KETOROLAC TROMETHAMINE 30 MG/ML
30 INJECTION, SOLUTION INTRAMUSCULAR; INTRAVENOUS ONCE
Status: COMPLETED | OUTPATIENT
Start: 2018-08-25 | End: 2018-08-25

## 2018-08-25 RX ADMIN — DIPHENHYDRAMINE HYDROCHLORIDE 25 MG: 50 INJECTION, SOLUTION INTRAMUSCULAR; INTRAVENOUS at 08:37

## 2018-08-25 RX ADMIN — SODIUM CHLORIDE 500 ML: 900 INJECTION, SOLUTION INTRAVENOUS at 08:29

## 2018-08-25 RX ADMIN — KETOROLAC TROMETHAMINE 30 MG: 30 INJECTION, SOLUTION INTRAMUSCULAR at 08:43

## 2018-08-25 RX ADMIN — PROCHLORPERAZINE EDISYLATE 10 MG: 5 INJECTION INTRAMUSCULAR; INTRAVENOUS at 08:41

## 2018-08-25 ASSESSMENT — ENCOUNTER SYMPTOMS
SORE THROAT: 0
NAUSEA: 1
VOMITING: 0
HEADACHES: 1
ABDOMINAL PAIN: 0
CHILLS: 0
COUGH: 0
RHINORRHEA: 0
FEVER: 0
DIARRHEA: 0
PHOTOPHOBIA: 1

## 2018-08-25 NOTE — DISCHARGE INSTRUCTIONS
1.  If the headache comes back take ibuprofen 600-800 mg 2-3 times a day along with Tylenol 1000 mg 3-4 times a day as needed for the headache  2.  Follow-up with your primary care physician as needed

## 2018-08-25 NOTE — ED AVS SNAPSHOT
[place] [address] [facility] [address]    Phone:  [phone]    Fax:  [phone]                                       Sofya Wise   MRN: [id number]    Department:  [facility]   Date of Visit:  8/25/2018           Patient Information     Date Of Birth          1985        Your diagnoses for this visit were:     Migraine without status migrainosus, not intractable, unspecified migraine type        You were seen by Michael Louis MD.        Discharge Instructions       1.  If the headache comes back take ibuprofen 600-800 mg 2-3 times a day along with Tylenol 1000 mg 3-4 times a day as needed for the headache  2.  Follow-up with your primary care physician as needed    Your next 10 appointments already scheduled     Sep 04, 2018  7:45 AM CDT   Leslie Lott with [facility] ([facility])    [address] [place] [address] [phone]            Sep 12, 2018 10:00 AM CDT   Leslie Rosenberg with [facility] ([facility])    [address] [place] [address] [phone]              24 Hour Appointment Hotline     To schedule an appointment at [facility], please call [phone]. If you don't have a family doctor or clinic, we will help you find one. [facility] clinics are conveniently located to serve the needs of you and your family.           Review of your medicines      Our records show that you are taking the medicines listed below. If these are incorrect, please call your family doctor or clinic.        Dose / Directions Last dose taken    ALPRAZolam 0.25 MG tablet   Commonly known as:  XANAX   Dose:  0.25 mg        Take 0.25 mg by mouth 2 times daily as needed for anxiety   Refills:  0        * breast pump Misc   Quantity:  1 each        For Home use. Length of need 1  year.   Refills:  0        * breast pump Misc   Quantity:  1 each        Reason for need: breastfeeding. Duration of use: 1 year   Refills:  0        buPROPion 300 MG 24 hr tablet   Commonly known as:  WELLBUTRIN XL   Dose:  300 mg   Quantity:  90 tablet        Take 1 tablet (300 mg) by mouth every morning   Refills:  3        hydrOXYzine 25 MG tablet   Commonly known as:  ATARAX   Dose:  25 mg        Take 25 mg by mouth 4 times daily as needed for anxiety   Refills:  0        loratadine 10 MG tablet   Commonly known as:  CLARITIN   Dose:  1 tablet   Quantity:  90 tablet        Take 1 tablet (10 mg) by mouth daily   Refills:  3        omeprazole 20 MG CR capsule   Commonly known as:  priLOSEC   Dose:  20 mg   Quantity:  90 capsule        Take 1 capsule (20 mg) by mouth daily   Refills:  3        polyethylene glycol powder   Commonly known as:  MIRALAX/GLYCOLAX   Dose:  17 g        Take 17 g by mouth   Refills:  0        triamcinolone 55 MCG/ACT inhaler   Commonly known as:  NASACORT AQ   Dose:  2 spray   Quantity:  1 Bottle        Spray 2 sprays into both nostrils daily   Refills:  1        * Notice:  This list has 2 medication(s) that are the same as other medications prescribed for you. Read the directions carefully, and ask your doctor or other care provider to review them with you.            Procedures and tests performed during your visit     HCG qualitative urine (UPT)      Orders Needing Specimen Collection     None      Pending Results     No orders found from 8/23/2018 to 8/26/2018.            Pending Culture Results     No orders found from 8/23/2018 to 8/26/2018.            Pending Results Instructions     If you had any lab results that were not finalized at the time of your Discharge, you can call the ED Lab Result RN at [phone]. You will be contacted by this team for any positive Lab results or changes in treatment. The nurses are available 7 days a week from 10A to 6:30P.  You can leave a  message 24 hours per day and they will return your call.        Thank you for choosing [facility]       Thank you for choosing [facility] for your care. Our goal is always to provide you with excellent care. Hearing back from our patients is one way we can continue to improve our services. Please take a few minutes to complete the written survey that you may receive in the mail after you visit with us. Thank you!        [employer] Information     [employer] Outreach Program gives you secure access to your electronic health record. If you see a primary care provider, you can also send messages to your care team and make appointments. If you have questions, please call your primary care clinic.  If you do not have a primary care provider, please call [phone] and they will assist you.        Care EveryWhere ID     This is your Care EveryWhere ID. This could be used by other organizations to access your [facility] medical records  [id number]        Equal Access to Services     HALEY ZACARIAS : Saeid Ivy, adrianna sanchez, steph asif. So [facility] [phone].    ATENCIÓN: Si habla español, tiene a remy disposición servicios gratuitos de asistencia lingüística. Llame al [phone].    We comply with applicable federal civil rights laws and [place] laws. We do not discriminate on the basis of race, color, national origin, age, disability, sex, sexual orientation, or gender identity.            After Visit Summary       This is your record. Keep this with you and show to your community pharmacist(s) and doctor(s) at your next visit.

## 2018-08-25 NOTE — ED AVS SNAPSHOT
[facility]    [address]    [place] [address]    Phone:  [phone]    Fax:  [phone]                                       Sofya Wise   MRN: [id number]    Department:  [facility]   Date of Visit:  8/25/2018           After Visit Summary Signature Page     I have received my discharge instructions, and my questions have been answered. I have discussed any challenges I see with this plan with the nurse or doctor.    ..........................................................................................................................................  Patient/Patient Representative Signature      ..........................................................................................................................................  Patient Representative Print Name and Relationship to Patient    ..................................................               ................................................  Date                                            Time    ..........................................................................................................................................  Reviewed by Signature/Title    ...................................................              ..............................................  Date                                                            Time          22EPIC Rev 08/18

## 2018-08-25 NOTE — ED PROVIDER NOTES
History   No chief complaint on file.    HPI Comments: This is a 33-year-old female tells me she has a history of recurrent migraine headache presenting with headache involving the left side of her head and rubs her arm behind the back of the head and down to the shoulder blades posteriorly which started last night.  Patient states she had taken Tylenol for the headache but it did not control it.  She states the target pain she is having is quite typical for her recurrent migraine headache pain.  She is having light and noise sensitivities and she has some nauseous feeling but she had not vomited.  She denies loss of vision, focal weakness, numbness/tingling or difficulty with gait.  She denies neck pain/neck stiffness.  No sore throat, earache or runny nose/nasal congestion reported.       Problem List:   Patient Active Problem List    Diagnosis Date Noted     Encounter for triage in pregnant patient 04/08/2018     Priority: Medium     Anxiety state 02/08/2018     Priority: Medium     Migraine headache 02/08/2018     Priority: Medium     Major depression, recurrent (H) 05/06/2013     Priority: Medium     Encounter for routine checking of intrauterine contraceptive device (IUD) 04/20/2012     Priority: Medium     Abdominal pain 04/20/2012     Priority: Medium     Other acne 08/18/2011     Priority: Medium     Encounter for other general counseling or advice on contraception 09/13/2010     Priority: Medium        Past Medical History:    Past Medical History:   Diagnosis Date     Dysplasia of cervix uteri      Family history of other specified conditions (CODE)      Major depressive disorder, single episode      Migraine without status migrainosus, not intractable      Other specified postprocedural states        Past Surgical History:    Past Surgical History:   Procedure Laterality Date     OTHER SURGICAL HISTORY      2 mo,,HERNIA REPAIR       Family History:    Family History   Problem Relation Age of Onset      Family History Negative Mother      Good Health     Family History Negative Father      Good Health     Family History Negative Brother      Good Health     Family History Negative Brother      Good Health     Family History Negative Sister      Good Health       Social History:  Marital Status:  Single [1]  Social History   Substance Use Topics     Smoking status: Former Smoker     Types: Cigarettes     Quit date: 3/24/2018     Smokeless tobacco: Never Used      Comment: Quit smoking: is trying, 1-2 a day     Alcohol use No        Medications:      buPROPion (WELLBUTRIN XL) 300 MG 24 hr tablet   loratadine (CLARITIN) 10 MG tablet   omeprazole (PRILOSEC) 20 MG CR capsule   ALPRAZolam (XANAX) 0.25 MG tablet   hydrOXYzine (ATARAX) 25 MG tablet   Misc. Devices (BREAST PUMP) MISC   Misc. Devices (BREAST PUMP) MISC   polyethylene glycol (MIRALAX/GLYCOLAX) powder   triamcinolone (NASACORT AQ) 55 MCG/ACT Inhaler         Review of Systems   Constitutional: Negative for chills and fever.   HENT: Negative for congestion, rhinorrhea and sore throat.    Eyes: Positive for photophobia and visual disturbance.   Respiratory: Negative for cough.    Gastrointestinal: Positive for nausea. Negative for abdominal pain, diarrhea and vomiting.   Neurological: Positive for headaches.   All other systems reviewed and are negative.      Physical Exam   BP: 130/80  Pulse: 85  Temp: 97.8  F (36.6  C)  Resp: 16  Height: 152.4 cm (5')  Weight: 63.5 kg (140 lb)  SpO2: 97 %      Physical Exam   Constitutional: She is oriented to person, place, and time. She appears well-developed. No distress.   Eyes: Conjunctivae and EOM are normal. Pupils are equal, round, and reactive to light.   Vision grossly intact   Neck: Normal range of motion. Neck supple.   Cardiovascular: Normal rate, regular rhythm, normal heart sounds and intact distal pulses.    Pulmonary/Chest: Effort normal and breath sounds normal. No respiratory distress. She has no wheezes.  She has no rales. She exhibits no tenderness.   Abdominal: Soft. Bowel sounds are normal. She exhibits no distension.   Musculoskeletal: Normal range of motion. She exhibits no edema.   Neurological: She is oriented to person, place, and time.   No focal neurologic findings on examination       ED Course     Young lady who presents with complaints of acute headache which she says feels quite similar to that, headache she has had recurrently in the past.  She has no focal neurologic findings or loss of vision on examination and she does not appear toxic.  Patient's vitals are stable.  No meningeal signs on examination.  After discussing treatment options with the patient she received the following treatment: Toradol 30 mg IV, Benadryl 25 mg IV, Compazine 10 mg IV and normal saline 500 cc IV bolus.  Headache has significantly improved after these treatments and patient wants to go home now.  Patient is able to ambulate without difficulties.  Advised to take ibuprofen/Tylenol as needed if the headache comes back and follow-up with her primary care physician.    ED Course     Procedures               Critical Care time:  none               Results for orders placed or performed during the hospital encounter of 08/25/18 (from the past 24 hour(s))   HCG qualitative urine (UPT)   Result Value Ref Range    HCG Qual Urine Negative NEG^Negative       Medications   ketorolac (TORADOL) injection 30 mg (30 mg Intravenous Given 8/25/18 0843)   prochlorperazine (COMPAZINE) injection 10 mg (10 mg Intravenous Given 8/25/18 0841)   diphenhydrAMINE (BENADRYL) injection 25 mg (25 mg Intravenous Given 8/25/18 0837)   0.9% sodium chloride BOLUS (0 mLs Intravenous Stopped 8/25/18 0854)       Assessments & Plan (with Medical Decision Making)     I have reviewed the nursing notes.    I have reviewed the findings, diagnosis, plan and need for follow up with the patient.       Discharge Medication List as of 8/25/2018  8:54 AM          Final  diagnoses:   Migraine without status migrainosus, not intractable, unspecified migraine type       8/25/2018   [facility] AND \[facility]\""     Michael Louis MD  08/25/18 4800

## 2018-08-25 NOTE — ED TRIAGE NOTES
Patient states that she is having pain in back of shoulder that is radiating to her head- causing a headache. Patient states that she gets migraines similar to this, but the pain today rates 7/10. Patient states that her sister's wedding is today and she just wants something for pain.    Aura Hartmann RN on 8/25/2018 at 8:11 AM

## 2018-09-04 ENCOUNTER — OFFICE VISIT (OUTPATIENT)
Dept: FAMILY MEDICINE | Facility: OTHER | Age: 33
End: 2018-09-04
Attending: FAMILY MEDICINE
Payer: COMMERCIAL

## 2018-09-04 VITALS
WEIGHT: 140.6 LBS | DIASTOLIC BLOOD PRESSURE: 88 MMHG | BODY MASS INDEX: 27.46 KG/M2 | SYSTOLIC BLOOD PRESSURE: 122 MMHG | HEART RATE: 80 BPM

## 2018-09-04 DIAGNOSIS — Z30.011 ENCOUNTER FOR INITIAL PRESCRIPTION OF CONTRACEPTIVE PILLS: Primary | ICD-10-CM

## 2018-09-04 DIAGNOSIS — F41.1 ANXIETY STATE: ICD-10-CM

## 2018-09-04 PROCEDURE — 99213 OFFICE O/P EST LOW 20 MIN: CPT | Performed by: FAMILY MEDICINE

## 2018-09-04 RX ORDER — ALPRAZOLAM 0.25 MG
0.25 TABLET ORAL 2 TIMES DAILY PRN
Qty: 90 TABLET | Refills: 1 | Status: SHIPPED | OUTPATIENT
Start: 2018-09-04 | End: 2021-02-25

## 2018-09-04 RX ORDER — NORGESTIMATE AND ETHINYL ESTRADIOL 0.25-0.035
1 KIT ORAL DAILY
Qty: 84 TABLET | Refills: 0 | Status: SHIPPED | OUTPATIENT
Start: 2018-09-04 | End: 2018-11-04

## 2018-09-04 ASSESSMENT — ENCOUNTER SYMPTOMS
CHILLS: 0
COUGH: 0
EYE DISCHARGE: 0
ACTIVITY CHANGE: 0
SHORTNESS OF BREATH: 0
APPETITE CHANGE: 0
HALLUCINATIONS: 0
FATIGUE: 0
EYE REDNESS: 0

## 2018-09-04 ASSESSMENT — ANXIETY QUESTIONNAIRES
GAD7 TOTAL SCORE: 18
6. BECOMING EASILY ANNOYED OR IRRITABLE: NEARLY EVERY DAY
5. BEING SO RESTLESS THAT IT IS HARD TO SIT STILL: NEARLY EVERY DAY
1. FEELING NERVOUS, ANXIOUS, OR ON EDGE: NEARLY EVERY DAY
IF YOU CHECKED OFF ANY PROBLEMS ON THIS QUESTIONNAIRE, HOW DIFFICULT HAVE THESE PROBLEMS MADE IT FOR YOU TO DO YOUR WORK, TAKE CARE OF THINGS AT HOME, OR GET ALONG WITH OTHER PEOPLE: EXTREMELY DIFFICULT
3. WORRYING TOO MUCH ABOUT DIFFERENT THINGS: MORE THAN HALF THE DAYS
2. NOT BEING ABLE TO STOP OR CONTROL WORRYING: MORE THAN HALF THE DAYS
7. FEELING AFRAID AS IF SOMETHING AWFUL MIGHT HAPPEN: MORE THAN HALF THE DAYS

## 2018-09-04 ASSESSMENT — PATIENT HEALTH QUESTIONNAIRE - PHQ9: 5. POOR APPETITE OR OVEREATING: NEARLY EVERY DAY

## 2018-09-04 NOTE — PROGRESS NOTES
SUBJECTIVE:   Sofya Wise is a 33 year old female who presents to clinic today for the following health issues:    HPI  Sofya is here for concerns of decreased mood ever since the insertion of her Mirena IUD; which was done 6/27/2018.   PHQ-9 SCORE 3/15/2018 4/12/2018 9/4/2018   Total Score 0 1 22     JANINE-7 SCORE 3/15/2018 4/12/2018 9/4/2018   Total Score 3 0 18       She also has an appointment with Erendira Ring scheduled for tomorrow for medication management; but in the meantime, interested in getting her Rx for Xanax filled.  She used this intermittently prior to the birth of her child; but hasn't had any during the pregnancy or afterward.  Not currently breast feeding any further.    Most interested in methods of keeping IUD in place, as she desires this for period control and contraception.  Had previously had the Mirena without significant difficulties; but does remember her boyfriend at the time implying that her mood improved after she had the IUD removed.        Patient Active Problem List    Diagnosis Date Noted     Encounter for triage in pregnant patient 04/08/2018     Priority: Medium     Anxiety state 02/08/2018     Priority: Medium     Migraine headache 02/08/2018     Priority: Medium     Major depression, recurrent (H) 05/06/2013     Priority: Medium     Encounter for routine checking of intrauterine contraceptive device (IUD) 04/20/2012     Priority: Medium     Abdominal pain 04/20/2012     Priority: Medium     Other acne 08/18/2011     Priority: Medium     Encounter for other general counseling or advice on contraception 09/13/2010     Priority: Medium     Past Medical History:   Diagnosis Date     Dysplasia of cervix uteri     cryotherapy 01/07.     Family history of other specified conditions (CODE)     following Pertussis vaccination, last seizure 1991.     Major depressive disorder, single episode     No Comments Provided     Migraine without status migrainosus, not  intractable     No Comments Provided     Other specified postprocedural states     at [facility], abnormal pap smears times 2.  Two sexual partners in her left.      Past Surgical History:   Procedure Laterality Date     OTHER SURGICAL HISTORY      2 mo,,HERNIA REPAIR     Family History   Problem Relation Age of Onset     Family History Negative Mother      Good Health     Family History Negative Father      Good Health     Family History Negative Brother      Good Health     Family History Negative Brother      Good Health     Family History Negative Sister      Good Health     Social History   Substance Use Topics     Smoking status: Current Every Day Smoker     Packs/day: 0.50     Types: Cigarettes     Last attempt to quit: 3/24/2018     Smokeless tobacco: Never Used      Comment: Quit smoking: is trying, 1-2 a day     Alcohol use No     Social History     Social History Narrative    No significant works at [employer]   No children     Current Outpatient Prescriptions   Medication Sig Dispense Refill     ALPRAZolam (XANAX) 0.25 MG tablet Take 1 tablet (0.25 mg) by mouth 2 times daily as needed for anxiety 90 tablet 1     norgestimate-ethinyl estradiol (ORTHO-CYCLEN, SPRINTEC) 0.25-35 MG-MCG per tablet Take 1 tablet by mouth daily 84 tablet 0     buPROPion (WELLBUTRIN XL) 300 MG 24 hr tablet Take 1 tablet (300 mg) by mouth every morning 90 tablet 3     loratadine (CLARITIN) 10 MG tablet Take 1 tablet (10 mg) by mouth daily 90 tablet 3     omeprazole (PRILOSEC) 20 MG CR capsule Take 1 capsule (20 mg) by mouth daily 90 capsule 3     Allergies   Allergen Reactions     Pertussis Vaccines      Other reaction(s): Seizures     Sertraline      Other reaction(s): Dizziness       Review of Systems   Constitutional: Negative for activity change, appetite change, chills and fatigue.   Eyes: Negative for discharge and redness.   Respiratory: Negative for cough and shortness of breath.     Psychiatric/Behavioral: Negative for hallucinations, self-injury and suicidal ideas (no active thoughts).        OBJECTIVE:     /88 (BP Location: Right arm, Patient Position: Sitting, Cuff Size: Adult Regular)  Pulse 80  Wt 140 lb 9.6 oz (63.8 kg)  BMI 27.46 kg/m2  Body mass index is 27.46 kg/(m^2).  Physical Exam   Constitutional: She appears well-developed and well-nourished. No distress.   HENT:   Head: Normocephalic and atraumatic.   Eyes: EOM are normal.   Neck: Normal range of motion.   Pulmonary/Chest: Effort normal.   Skin: No rash noted. She is not diaphoretic. No erythema. No pallor.   Psychiatric:   Reserved; but appropriate with good eye contact.   Nursing note and vitals reviewed.      Diagnostic Test Results:  none     ASSESSMENT/PLAN:     1. Encounter for initial prescription of contraceptive pills  Attempt to be made to keep IUD - Rx for Sprintec x 2-3 months to help balance hormones and regulate bleeding.  If helpful; may consider [facility] vs lower dose of IUD (change to Kyleena).  - norgestimate-ethinyl estradiol (ORTHO-CYCLEN, SPRINTEC) 0.25-35 MG-MCG per tablet; Take 1 tablet by mouth daily  Dispense: 84 tablet; Refill: 0    2. Anxiety state  Along with concurrent depression - putting her at risk for worsening moods with hormonal changes, life stressors.  Did discuss large transition with recent child, and working on a house remodel.  Encouraged her to continue with appointment tomorrow with Erendira Bowens.  In the meantime, refilled current Xanax #90 with 1 refill Rx.  - ALPRAZolam (XANAX) 0.25 MG tablet; Take 1 tablet (0.25 mg) by mouth 2 times daily as needed for anxiety  Dispense: 90 tablet; Refill: 1      Yael Whyte DO  [facility] [place] [facility]

## 2018-09-04 NOTE — MR AVS SNAPSHOT
After Visit Summary   9/4/2018    Sofya Wise    MRN: [id number]           Patient Information     Date Of Birth          1985        Visit Information        Provider Department      9/4/2018 7:45 AM Yael Whyte DO [facility]        Today's Diagnoses     Encounter for initial prescription of contraceptive pills    -  1    Anxiety state           Follow-ups after your visit        Your next 10 appointments already scheduled     Sep 12, 2018 10:00 AM CDT   Leslie Rosenberg with Yael Whyte DO   [facility] ([facility])    [address]  [place] [id number] [phone]              Who to contact     If you have questions or need follow up information about today's clinic visit or your schedule please contact [facility] directly at [phone].  Normal or non-critical lab and imaging results will be communicated to you by MyChart, letter or phone within 4 business days after the clinic has received the results. If you do not hear from us within 7 days, please contact the clinic through [employer] or phone. If you have a critical or abnormal lab result, we will notify you by phone as soon as possible.  Submit refill requests through [employer] or call your pharmacy and they will forward the refill request to us. Please allow 3 business days for your refill to be completed.          Additional Information About Your Visit        MyChart Information     [employer] gives you secure access to your electronic health record. If you see a primary care provider, you can also send messages to your care team and make appointments. If you have questions, please call your primary care clinic.  If you do not have a primary care provider, please call [phone] and they will assist you.        Care EveryWhere ID     This is your Care EveryWhere ID. This could be used by other organizations to access your  [place] medical records  [id number]        Your Vitals Were     Pulse BMI (Body Mass Index)                80 27.46 kg/m2           Blood Pressure from Last 3 Encounters:   09/04/18 122/88   08/25/18 125/85   06/27/18 122/78    Weight from Last 3 Encounters:   09/04/18 140 lb 9.6 oz (63.8 kg)   08/25/18 140 lb (63.5 kg)   06/27/18 139 lb 9 oz (63.3 kg)              Today, you had the following     No orders found for display         Today's Medication Changes          These changes are accurate as of 9/4/18  8:16 AM.  If you have any questions, ask your nurse or doctor.               Start taking these medicines.        Dose/Directions    norgestimate-ethinyl estradiol 0.25-35 MG-MCG per tablet   Commonly known as:  ORTHO-CYCLEN, SPRINTEC   Used for:  Encounter for initial prescription of contraceptive pills   Started by:  Yael Whyte,         Dose:  1 tablet   Take 1 tablet by mouth daily   Quantity:  84 tablet   Refills:  0            Where to get your medicines      These medications were sent to [id number] - [place], [place] - [address]  [address], [facility] [address]     Phone:  [phone]     norgestimate-ethinyl estradiol 0.25-35 MG-MCG per tablet         Some of these will need a paper prescription and others can be bought over the counter.  Ask your nurse if you have questions.     Bring a paper prescription for each of these medications     ALPRAZolam 0.25 MG tablet                Primary Care Provider Fax #    Physician No Ref-Primary [phone]       No address on file        Equal Access to Services     [facility] RELL : Hadradha gomez Socarlos, waaxda luqadaha, qaybta kaalmada steph ro. So [facility] [phone].    ATENCIÓN: Si habla español, tiene a remy disposición servicios gratuitos de asistencia lingüística. Llame al [phone].    We comply with applicable federal civil rights laws and  [place] laws. We do not discriminate on the basis of race, color, national origin, age, disability, sex, sexual orientation, or gender identity.            Thank you!     Thank you for choosing [facility] AND [facility]  for your care. Our goal is always to provide you with excellent care. Hearing back from our patients is one way we can continue to improve our services. Please take a few minutes to complete the written survey that you may receive in the mail after your visit with us. Thank you!             Your Updated Medication List - Protect others around you: Learn how to safely use, store and throw away your medicines at www.disposemymeds.org.          This list is accurate as of 9/4/18  8:16 AM.  Always use your most recent med list.                   Brand Name Dispense Instructions for use Diagnosis    ALPRAZolam 0.25 MG tablet    XANAX    90 tablet    Take 1 tablet (0.25 mg) by mouth 2 times daily as needed for anxiety    Anxiety state       buPROPion 300 MG 24 hr tablet    WELLBUTRIN XL    90 tablet    Take 1 tablet (300 mg) by mouth every morning    Anxiety       loratadine 10 MG tablet    CLARITIN    90 tablet    Take 1 tablet (10 mg) by mouth daily    Gastroesophageal reflux disease without esophagitis, Chronic seasonal allergic rhinitis, unspecified trigger       norgestimate-ethinyl estradiol 0.25-35 MG-MCG per tablet    ORTHO-CYCLEN, SPRINTEC    84 tablet    Take 1 tablet by mouth daily    Encounter for initial prescription of contraceptive pills       omeprazole 20 MG CR capsule    priLOSEC    90 capsule    Take 1 capsule (20 mg) by mouth daily    Gastroesophageal reflux disease without esophagitis

## 2018-09-06 ASSESSMENT — PATIENT HEALTH QUESTIONNAIRE - PHQ9: SUM OF ALL RESPONSES TO PHQ QUESTIONS 1-9: 22

## 2018-09-06 ASSESSMENT — ANXIETY QUESTIONNAIRES: GAD7 TOTAL SCORE: 18

## 2018-11-04 ENCOUNTER — HOSPITAL ENCOUNTER (EMERGENCY)
Facility: OTHER | Age: 33
Discharge: HOME | End: 2018-11-04
Attending: PHYSICIAN ASSISTANT | Admitting: PHYSICIAN ASSISTANT
Payer: COMMERCIAL

## 2018-11-04 VITALS
TEMPERATURE: 97.3 F | DIASTOLIC BLOOD PRESSURE: 69 MMHG | RESPIRATION RATE: 14 BRPM | SYSTOLIC BLOOD PRESSURE: 129 MMHG | OXYGEN SATURATION: 94 %

## 2018-11-04 DIAGNOSIS — G44.89 OTHER HEADACHE SYNDROME: ICD-10-CM

## 2018-11-04 LAB
ALBUMIN UR-MCNC: NEGATIVE MG/DL
APPEARANCE UR: CLEAR
BILIRUB UR QL STRIP: NEGATIVE
COLOR UR AUTO: YELLOW
GLUCOSE UR STRIP-MCNC: NEGATIVE MG/DL
HCG UR QL: NEGATIVE
HGB UR QL STRIP: NEGATIVE
KETONES UR STRIP-MCNC: 40 MG/DL
LEUKOCYTE ESTERASE UR QL STRIP: NEGATIVE
NITRATE UR QL: NEGATIVE
PH UR STRIP: 7 PH (ref 5–9)
SOURCE: ABNORMAL
SP GR UR STRIP: 1.01 (ref 1–1.03)
UROBILINOGEN UR STRIP-ACNC: 0.2 EU/DL (ref 0.2–1)

## 2018-11-04 PROCEDURE — 81003 URINALYSIS AUTO W/O SCOPE: CPT | Performed by: PHYSICIAN ASSISTANT

## 2018-11-04 PROCEDURE — 96374 THER/PROPH/DIAG INJ IV PUSH: CPT | Performed by: PHYSICIAN ASSISTANT

## 2018-11-04 PROCEDURE — 96375 TX/PRO/DX INJ NEW DRUG ADDON: CPT | Performed by: PHYSICIAN ASSISTANT

## 2018-11-04 PROCEDURE — 81025 URINE PREGNANCY TEST: CPT | Performed by: PHYSICIAN ASSISTANT

## 2018-11-04 PROCEDURE — 99283 EMERGENCY DEPT VISIT LOW MDM: CPT | Mod: Z6 | Performed by: PHYSICIAN ASSISTANT

## 2018-11-04 PROCEDURE — 99284 EMERGENCY DEPT VISIT MOD MDM: CPT | Mod: 25 | Performed by: PHYSICIAN ASSISTANT

## 2018-11-04 PROCEDURE — 96361 HYDRATE IV INFUSION ADD-ON: CPT | Performed by: PHYSICIAN ASSISTANT

## 2018-11-04 PROCEDURE — 25000128 H RX IP 250 OP 636: Performed by: PHYSICIAN ASSISTANT

## 2018-11-04 RX ORDER — IBUPROFEN 800 MG/1
800 TABLET, FILM COATED ORAL
COMMUNITY
Start: 2018-04-30 | End: 2019-03-25

## 2018-11-04 RX ORDER — DOCUSATE SODIUM 100 MG/1
100 CAPSULE, LIQUID FILLED ORAL
COMMUNITY
Start: 2018-04-30 | End: 2019-08-15

## 2018-11-04 RX ORDER — DIPHENHYDRAMINE HYDROCHLORIDE 50 MG/ML
25 INJECTION INTRAMUSCULAR; INTRAVENOUS ONCE
Status: COMPLETED | OUTPATIENT
Start: 2018-11-04 | End: 2018-11-04

## 2018-11-04 RX ORDER — ACETAMINOPHEN 500 MG
1000 TABLET ORAL
COMMUNITY
Start: 2018-04-30 | End: 2019-08-15

## 2018-11-04 RX ORDER — KETOROLAC TROMETHAMINE 30 MG/ML
30 INJECTION, SOLUTION INTRAMUSCULAR; INTRAVENOUS ONCE
Status: COMPLETED | OUTPATIENT
Start: 2018-11-04 | End: 2018-11-04

## 2018-11-04 RX ORDER — POLYETHYLENE GLYCOL 3350 17 G/17G
17 POWDER, FOR SOLUTION ORAL
COMMUNITY
Start: 2016-12-01 | End: 2019-08-15

## 2018-11-04 RX ADMIN — PROCHLORPERAZINE EDISYLATE 10 MG: 5 INJECTION INTRAMUSCULAR; INTRAVENOUS at 17:33

## 2018-11-04 RX ADMIN — SODIUM CHLORIDE 1000 ML: 900 INJECTION, SOLUTION INTRAVENOUS at 17:22

## 2018-11-04 RX ADMIN — KETOROLAC TROMETHAMINE 30 MG: 30 INJECTION, SOLUTION INTRAMUSCULAR at 17:32

## 2018-11-04 RX ADMIN — DIPHENHYDRAMINE HYDROCHLORIDE 25 MG: 50 INJECTION INTRAMUSCULAR; INTRAVENOUS at 17:32

## 2018-11-04 ASSESSMENT — ENCOUNTER SYMPTOMS
VOMITING: 1
NAUSEA: 1
HEADACHES: 1

## 2018-11-04 NOTE — ED PROVIDER NOTES
History   No chief complaint on file.    HPI  Sofya Wise is a 33 year old female who presents to the ED today with a chief complaint of a headache.  Patient reports that this morning her headache began while laying her child to sleep.  Patient reports that she has a history of migraines this feels normal to her.  Patient denies it being the worst headache of her life.  Patient has tried Tylenol at home with no relief.  Patient reports nausea and vomiting and rates her pain as an 8 out of 10.  Patient has no other complaints.    Problem List:    Patient Active Problem List    Diagnosis Date Noted     Encounter for triage in pregnant patient 04/08/2018     Priority: Medium     Anxiety state 02/08/2018     Priority: Medium     Migraine headache 02/08/2018     Priority: Medium     Major depression, recurrent (H) 05/06/2013     Priority: Medium     Encounter for routine checking of intrauterine contraceptive device (IUD) 04/20/2012     Priority: Medium     Abdominal pain 04/20/2012     Priority: Medium     Other acne 08/18/2011     Priority: Medium     Encounter for other general counseling or advice on contraception 09/13/2010     Priority: Medium        Past Medical History:    Past Medical History:   Diagnosis Date     Dysplasia of cervix uteri      Family history of other specified conditions (CODE)      Major depressive disorder, single episode      Migraine without status migrainosus, not intractable      Other specified postprocedural states        Past Surgical History:    Past Surgical History:   Procedure Laterality Date     OTHER SURGICAL HISTORY      2 mo,,HERNIA REPAIR       Family History:    Family History   Problem Relation Age of Onset     Family History Negative Mother      Good Health     Family History Negative Father      Good Health     Family History Negative Brother      Good Health     Family History Negative Brother      Good Health     Family History Negative Sister      Good  Health       Social History:  Marital Status:  Single [1]  Social History   Substance Use Topics     Smoking status: Current Every Day Smoker     Packs/day: 0.50     Types: Cigarettes     Last attempt to quit: 3/24/2018     Smokeless tobacco: Never Used      Comment: Quit smoking: is trying, 1-2 a day     Alcohol use No        Medications:      acetaminophen (TYLENOL) 500 MG tablet   ALPRAZolam (XANAX) 0.25 MG tablet   buPROPion (WELLBUTRIN XL) 300 MG 24 hr tablet   docusate sodium (DSS) 100 MG capsule   ibuprofen (ADVIL/MOTRIN) 800 MG tablet   loratadine (CLARITIN) 10 MG tablet   NEXT CHOICE ONE DOSE 1.5 MG TABS tablet   omeprazole (PRILOSEC) 20 MG CR capsule   polyethylene glycol (MIRALAX/GLYCOLAX) powder         Review of Systems   Gastrointestinal: Positive for nausea and vomiting.   Neurological: Positive for headaches.   All other systems reviewed and are negative.      Physical Exam   BP: 129/69  Heart Rate: 84  Temp: 97.3  F (36.3  C)  Resp: 14  SpO2: 94 %      Physical Exam   Constitutional: She is oriented to person, place, and time. She appears well-developed and well-nourished. No distress.   HENT:   Head: Normocephalic and atraumatic.   Eyes: EOM are normal. Pupils are equal, round, and reactive to light.   Neck: Normal range of motion.   Cardiovascular: Normal rate, regular rhythm and normal heart sounds.    No murmur heard.  Pulmonary/Chest: Effort normal and breath sounds normal. No respiratory distress.   Abdominal: Soft. Bowel sounds are normal. There is no tenderness.   Musculoskeletal: Normal range of motion.   Neurological: She is alert and oriented to person, place, and time. No cranial nerve deficit. Coordination normal.   Skin: Skin is warm. She is not diaphoretic.   Psychiatric: She has a normal mood and affect. Her behavior is normal. Judgment and thought content normal.       ED Course     ED Course     Procedures               Critical Care time:  none               Results for orders  placed or performed during the hospital encounter of 11/04/18 (from the past 24 hour(s))   *UA reflex to Microscopic   Result Value Ref Range    Color Urine Yellow     Appearance Urine Clear     Glucose Urine Negative NEG^Negative mg/dL    Bilirubin Urine Negative NEG^Negative    Ketones Urine 40 (A) NEG^Negative mg/dL    Specific Gravity Urine 1.010 1.000 - 1.030    Blood Urine Negative NEG^Negative    pH Urine 7.0 5.0 - 9.0 pH    Protein Albumin Urine Negative NEG^Negative mg/dL    Urobilinogen Urine 0.2 0.2 - 1.0 EU/dL    Nitrite Urine Negative NEG^Negative    Leukocyte Esterase Urine Negative NEG^Negative    Source Midstream Urine    HCG qualitative urine   Result Value Ref Range    HCG Qual Urine Negative NEG^Negative       Medications   0.9% sodium chloride BOLUS (0 mLs Intravenous Stopped 11/4/18 1915)   ketorolac (TORADOL) injection 30 mg (30 mg Intravenous Given 11/4/18 1732)   diphenhydrAMINE (BENADRYL) injection 25 mg (25 mg Intravenous Given 11/4/18 1732)   prochlorperazine (COMPAZINE) injection 10 mg (10 mg Intravenous Given 11/4/18 1733)       Assessments & Plan (with Medical Decision Making)   Patient seen and examined.  Patient is nontoxic-appearing in no acute distress.  Heart, lung, bowel sounds normal.  Abdomen soft nontender to palpation, nondistended.  Patient was neurologically intact.  Patient reports a gradual onset of headache, claims it is not the worst headache of her life and that this feels similar to past headaches, patient is afebrile with full range of motion of her neck.  At this time does not appear to be the patient is suffering from a subarachnoid hemorrhage or other life-threatening neurological disorder. Past notes revealed that pt was treated successfully with fluids, 30 Toradol, 25 benadryl, 10 compazine. We will repeat that today.     Upon reassessment, the patient is sleeping comfortably.    Short time later patient was reassessed again and reported that her headache was  pretty much gone.  I am encouraged by the appearance patient as well as her stable vital signs and reassuring physical exam.  Patient is to follow-up with her PCP as needed or return to the ED if symptoms recur or worsen.  Patient understood and agree with plan patient was discharged.    Alejandro Greenberg PA-C    I have reviewed the nursing notes.    I have reviewed the findings, diagnosis, plan and need for follow up with the patient.       Discharge Medication List as of 11/4/2018  7:15 PM          Final diagnoses:   Other headache syndrome       11/4/2018   [facility] AND \[facility] [place] [facility]\""     Alejandro Greenberg PA  11/05/18 1025       Alejandro Greenberg PA  11/05/18 1027

## 2018-11-04 NOTE — ED TRIAGE NOTES
Pt is here with spouse and has C/O a migraine.  Pt states she has had the migraine since this morning.  Pt states she has a hx of migraines. Pt states that it is not going away on its own.  Pt reports N/V X5.  Pt rates her pain a 8/10.

## 2018-11-04 NOTE — ED AVS SNAPSHOT
[facility]    [address]    [place] [address]    Phone:  [phone]    Fax:  [phone]                                       Sofya Wise   MRN: [id number]    Department:  [facility]   Date of Visit:  11/4/2018           After Visit Summary Signature Page     I have received my discharge instructions, and my questions have been answered. I have discussed any challenges I see with this plan with the nurse or doctor.    ..........................................................................................................................................  Patient/Patient Representative Signature      ..........................................................................................................................................  Patient Representative Print Name and Relationship to Patient    ..................................................               ................................................  Date                                   Time    ..........................................................................................................................................  Reviewed by Signature/Title    ...................................................              ..............................................  Date                                               Time          22EPIC Rev 08/18

## 2018-11-04 NOTE — ED AVS SNAPSHOT
[facility]    [address]    [place] [address]    Phone:  [phone]    Fax:  [phone]                                       Sofya Wise   MRN: [id number]    Department:  [facility]   Date of Visit:  11/4/2018           Patient Information     Date Of Birth          1985        Your diagnoses for this visit were:     Other headache syndrome        You were seen by Alejandro Greenberg PA.      Follow-up Information     Follow up with No Ref-Primary, Physician.    Why:  As needed        Discharge Instructions          Get plenty of fluids and rest.  You can take Tylenol and ibuprofen if it helps with your headache.  Return to the ED if symptoms worsen in her uncontrolled or your headache changes in presentation from its normal state.    * HEADACHE [unspecified]    The cause of your headache today is not clear, but it does not appear to be the sign of any serious illness.  Under stress, some people tense the muscles of their shoulder, neck and scalp without knowing it. If this condition lasts long enough, a TENSION HEADACHE can occur.  A MIGRAINE HEADACHE is caused by changes in blood flow to the brain. It can be mild or severe. A migraine attack may be triggered by emotional stress, hormone changes during the menstrual cycle, oral contraceptives, alcohol use, certain foods containing tyramine, eye strain, weather changes, missing meals, lack of sleep or oversleeping.  Other causes of headache include a viral illness, sinus, ear or throat infection, dental pain and TMJ (jaw joint) pain.  HOME CARE:      If you were given pain medicine for this headache, do not drive yourself home. Arrange for a ride, instead. When you get home, try to sleep. You should feel much better when you wake up.    If you are having nausea or vomiting, follow a light diet until your headache is relieved.    If you have a migraine type headache, use sunglasses when in  the daylight or around bright indoor lighting until symptoms improve. Bright glaring light can worsen this kind of headache.  FOLLOW UP with your doctor if the headache is not better within the next 24 hours. If you have frequent headaches you should discuss a treatment plan with your primary care doctor. By being aware of the earliest signs of headache, and starting treatment right away, you may be able to stop the pain yourself.  GET PROMPT MEDICAL ATTENTION if any of the following occur:    Worsening of your head pain or no improvement within 24 hours    Repeated vomiting (unable to keep liquids down)    Fever over 101 F (38.3 C)    Stiff neck    Extreme drowsiness, confusion or fainting    Weakness of an arm or leg or one side of the face    Difficulty with speech or vision    9218-5623 [employer]. [address] [place] [address], [place] [address]. All rights reserved. This information is not intended as a substitute for professional medical care. Always follow your healthcare professional's instructions.  This information has been modified by your health care provider with permission from the publisher.      24 Hour Appointment Hotline     To schedule an appointment at [facility], please call [phone]. If you don't have a family doctor or clinic, we will help you find one. [facility] are conveniently located to serve the needs of you and your family.           Review of your medicines      Our records show that you are taking the medicines listed below. If these are incorrect, please call your family doctor or clinic.        Dose / Directions Last dose taken    acetaminophen 500 MG tablet   Commonly known as:  TYLENOL   Dose:  1000 mg        Take 1,000 mg by mouth   Refills:  0        ALPRAZolam 0.25 MG tablet   Commonly known as:  XANAX   Dose:  0.25 mg   Quantity:  90 tablet        Take 1 tablet (0.25 mg) by mouth 2 times daily as needed for anxiety   Refills:  1        buPROPion 300 MG 24  hr tablet   Commonly known as:  WELLBUTRIN XL   Dose:  300 mg   Quantity:  90 tablet        Take 1 tablet (300 mg) by mouth every morning   Refills:  3        docusate sodium 100 MG capsule   Commonly known as:  COLACE   Dose:  100 mg        Take 100 mg by mouth   Refills:  0        ibuprofen 800 MG tablet   Commonly known as:  ADVIL/MOTRIN   Dose:  800 mg        Take 800 mg by mouth   Refills:  0        loratadine 10 MG tablet   Commonly known as:  CLARITIN   Dose:  1 tablet   Quantity:  90 tablet        Take 1 tablet (10 mg) by mouth daily   Refills:  3        NEXT CHOICE ONE DOSE 1.5 MG Tabs tablet   Generic drug:  levonorgestrel        Refills:  0        omeprazole 20 MG CR capsule   Commonly known as:  priLOSEC   Dose:  20 mg   Quantity:  90 capsule        Take 1 capsule (20 mg) by mouth daily   Refills:  3        polyethylene glycol powder   Commonly known as:  MIRALAX/GLYCOLAX   Dose:  17 g        Take 17 g by mouth   Refills:  0                Procedures and tests performed during your visit     *UA reflex to Microscopic    HCG qualitative urine      Orders Needing Specimen Collection     None      Pending Results     Date and Time Order Name Status Description    11/4/2018 1712 HCG qualitative urine In process     11/4/2018 1712 *UA reflex to Microscopic In process             Pending Culture Results     Date and Time Order Name Status Description    11/4/2018 1712 HCG qualitative urine In process     11/4/2018 1712 *UA reflex to Microscopic In process             Pending Results Instructions     If you had any lab results that were not finalized at the time of your Discharge, you can call the ED Lab Result RN at [phone]. You will be contacted by this team for any positive Lab results or changes in treatment. The nurses are available 7 days a week from 10A to 6:30P.  You can leave a message 24 hours per day and they will return your call.        Thank you for choosing [facility]       Thank you for  choosing [facility] for your care. Our goal is always to provide you with excellent care. Hearing back from our patients is one way we can continue to improve our services. Please take a few minutes to complete the written survey that you may receive in the mail after you visit with us. Thank you!        [employer] Information     [employer] gives you secure access to your electronic health record. If you see a primary care provider, you can also send messages to your care team and make appointments. If you have questions, please call your primary care clinic.  If you do not have a primary care provider, please call [phone] and they will assist you.        Care EveryWhere ID     This is your Care EveryWhere ID. This could be used by other organizations to access your [facility] medical records  [id number]        Equal Access to Services     HALEY ZACARIAS : Saeid Ivy, adrianna sanchez, arthur ro, steph wu. So [facility] [phone].    ATENCIÓN: Si habla español, tiene a remy disposición servicios gratuitos de asistencia lingüística. Llame al [phone].    We comply with applicable federal civil rights laws and [place] laws. We do not discriminate on the basis of race, color, national origin, age, disability, sex, sexual orientation, or gender identity.            After Visit Summary       This is your record. Keep this with you and show to your community pharmacist(s) and doctor(s) at your next visit.

## 2018-11-05 NOTE — DISCHARGE INSTRUCTIONS
Get plenty of fluids and rest.  You can take Tylenol and ibuprofen if it helps with your headache.  Return to the ED if symptoms worsen in her uncontrolled or your headache changes in presentation from its normal state.    * HEADACHE [unspecified]    The cause of your headache today is not clear, but it does not appear to be the sign of any serious illness.  Under stress, some people tense the muscles of their shoulder, neck and scalp without knowing it. If this condition lasts long enough, a TENSION HEADACHE can occur.  A MIGRAINE HEADACHE is caused by changes in blood flow to the brain. It can be mild or severe. A migraine attack may be triggered by emotional stress, hormone changes during the menstrual cycle, oral contraceptives, alcohol use, certain foods containing tyramine, eye strain, weather changes, missing meals, lack of sleep or oversleeping.  Other causes of headache include a viral illness, sinus, ear or throat infection, dental pain and TMJ (jaw joint) pain.  HOME CARE:      If you were given pain medicine for this headache, do not drive yourself home. Arrange for a ride, instead. When you get home, try to sleep. You should feel much better when you wake up.    If you are having nausea or vomiting, follow a light diet until your headache is relieved.    If you have a migraine type headache, use sunglasses when in the daylight or around bright indoor lighting until symptoms improve. Bright glaring light can worsen this kind of headache.  FOLLOW UP with your doctor if the headache is not better within the next 24 hours. If you have frequent headaches you should discuss a treatment plan with your primary care doctor. By being aware of the earliest signs of headache, and starting treatment right away, you may be able to stop the pain yourself.  GET PROMPT MEDICAL ATTENTION if any of the following occur:    Worsening of your head pain or no improvement within 24 hours    Repeated vomiting (unable to keep  liquids down)    Fever over 101 F (38.3 C)    Stiff neck    Extreme drowsiness, confusion or fainting    Weakness of an arm or leg or one side of the face    Difficulty with speech or vision    [id number] [employer]. [address] [place] [address], [place] [address]. All rights reserved. This information is not intended as a substitute for professional medical care. Always follow your healthcare professional's instructions.  This information has been modified by your health care provider with permission from the publisher.

## 2019-03-25 ENCOUNTER — OFFICE VISIT (OUTPATIENT)
Dept: FAMILY MEDICINE | Facility: OTHER | Age: 34
End: 2019-03-25
Attending: NURSE PRACTITIONER
Payer: COMMERCIAL

## 2019-03-25 VITALS
HEIGHT: 61 IN | RESPIRATION RATE: 18 BRPM | OXYGEN SATURATION: 98 % | DIASTOLIC BLOOD PRESSURE: 80 MMHG | HEART RATE: 88 BPM | TEMPERATURE: 98.1 F | SYSTOLIC BLOOD PRESSURE: 110 MMHG | WEIGHT: 122.5 LBS | BODY MASS INDEX: 23.13 KG/M2

## 2019-03-25 DIAGNOSIS — Z72.0 TOBACCO ABUSE: ICD-10-CM

## 2019-03-25 DIAGNOSIS — Z71.6 TOBACCO ABUSE COUNSELING: ICD-10-CM

## 2019-03-25 DIAGNOSIS — Z86.69 HISTORY OF MIGRAINE HEADACHES: Primary | ICD-10-CM

## 2019-03-25 PROBLEM — Z36.89 ENCOUNTER FOR TRIAGE IN PREGNANT PATIENT: Status: RESOLVED | Noted: 2018-04-08 | Resolved: 2019-03-25

## 2019-03-25 PROCEDURE — 99213 OFFICE O/P EST LOW 20 MIN: CPT | Performed by: NURSE PRACTITIONER

## 2019-03-25 RX ORDER — METAXALONE 800 MG/1
800 TABLET ORAL 3 TIMES DAILY PRN
Qty: 30 TABLET | Refills: 0 | Status: SHIPPED | OUTPATIENT
Start: 2019-03-25 | End: 2019-08-15

## 2019-03-25 ASSESSMENT — PAIN SCALES - GENERAL: PAINLEVEL: MODERATE PAIN (5)

## 2019-03-25 ASSESSMENT — ANXIETY QUESTIONNAIRES
3. WORRYING TOO MUCH ABOUT DIFFERENT THINGS: NEARLY EVERY DAY
6. BECOMING EASILY ANNOYED OR IRRITABLE: MORE THAN HALF THE DAYS
5. BEING SO RESTLESS THAT IT IS HARD TO SIT STILL: NEARLY EVERY DAY
1. FEELING NERVOUS, ANXIOUS, OR ON EDGE: NEARLY EVERY DAY
7. FEELING AFRAID AS IF SOMETHING AWFUL MIGHT HAPPEN: SEVERAL DAYS
IF YOU CHECKED OFF ANY PROBLEMS ON THIS QUESTIONNAIRE, HOW DIFFICULT HAVE THESE PROBLEMS MADE IT FOR YOU TO DO YOUR WORK, TAKE CARE OF THINGS AT HOME, OR GET ALONG WITH OTHER PEOPLE: SOMEWHAT DIFFICULT
2. NOT BEING ABLE TO STOP OR CONTROL WORRYING: MORE THAN HALF THE DAYS
GAD7 TOTAL SCORE: 17

## 2019-03-25 ASSESSMENT — PATIENT HEALTH QUESTIONNAIRE - PHQ9
5. POOR APPETITE OR OVEREATING: NEARLY EVERY DAY
SUM OF ALL RESPONSES TO PHQ QUESTIONS 1-9: 7

## 2019-03-25 ASSESSMENT — MIFFLIN-ST. JEOR: SCORE: 1208.04

## 2019-03-25 NOTE — PROGRESS NOTES
"  SUBJECTIVE:   Sofya Wise is a 33 year old female who presents to clinic today for the following health issues:    Headache  Onset: age 5    Description:   Location: usually starts in back of neck to crown of head, into temples and jaw bilaterally   Character: sharp pain, squeezing pain, \"vise pressure\"  Frequency:  At least once a month causes missed work, twice a month with bad ones, every other day with headaches   Duration:  Usually 1 day, \"wakes up in the am and it will be gone usually, but today it's not\"    Intensity: moderate, severe    Progression of Symptoms:  same and intermittent    Accompanying Signs & Symptoms:  Stiff neck: YES  Neck or upper back pain: YES  Fever: no  Sinus pressure: YES  Nausea or vomiting: YES- not today, but at times it is present  Dizziness: YES- not today, but with the really bad ones  Numbness: no  Weakness: YES- not today, but with the bad ones  Visual changes: YES- yesterday had some floaters    History:   Head trauma: no  Family history of migraines: YES- aunt  Previous tests for headaches: YES- had an EEG at age 14  Neurologist evaluations: no  Able to do daily activities: no  Wake with a headaches: YES- today and with the bad ones, not with most  Do headaches wake you up: YES  Daily pain medication use: no  Work/school stressors/changes: no    Precipitating factors:   Does light make it worse: YES  Does sound make it worse: YES    Alleviating factors:  Does sleep help: YES- usually    Therapies Tried and outcome: flexeril, Tylenol and Imitrex, massage, heat, cold    Patient would like to try the nicotrol inhaler for tobacco cessation. Has cut down on cigarettes, though finding it difficult to quit completely.     Problem list and histories reviewed & adjusted, as indicated.  Additional history: as documented    Patient Active Problem List   Diagnosis     Other acne     Anxiety state     Encounter for routine checking of intrauterine contraceptive device (IUD)     " Major depression, recurrent (H)     Abdominal pain     Migraine without status migrainosus, not intractable     Allergic rhinitis     Past Surgical History:   Procedure Laterality Date     OTHER SURGICAL HISTORY      2 mo,,HERNIA REPAIR       Social History     Tobacco Use     Smoking status: Current Every Day Smoker     Packs/day: 0.50     Types: Cigarettes     Last attempt to quit: 3/24/2018     Years since quittin.0     Smokeless tobacco: Never Used     Tobacco comment: Quit smoking: is trying, 1-2 a day   Substance Use Topics     Alcohol use: No     Alcohol/week: 0.6 oz     Family History   Problem Relation Age of Onset     Family History Negative Mother         Good Health     Family History Negative Father         Good Health     Family History Negative Brother         Good Health     Family History Negative Brother         Good Health     Family History Negative Sister         Good Health         Current Outpatient Medications   Medication Sig Dispense Refill     acetaminophen (TYLENOL) 500 MG tablet Take 1,000 mg by mouth       metaxalone (SKELAXIN) 800 MG tablet Take 1 tablet (800 mg) by mouth 3 times daily as needed for other (muscle tension leading to headache) 30 tablet 0     ALPRAZolam (XANAX) 0.25 MG tablet Take 1 tablet (0.25 mg) by mouth 2 times daily as needed for anxiety (Patient not taking: Reported on 3/25/2019) 90 tablet 1     docusate sodium (DSS) 100 MG capsule Take 100 mg by mouth       omeprazole (PRILOSEC) 20 MG CR capsule Take 1 capsule (20 mg) by mouth daily (Patient not taking: Reported on 3/25/2019) 90 capsule 3     polyethylene glycol (MIRALAX/GLYCOLAX) powder Take 17 g by mouth       Allergies   Allergen Reactions     Pertussis Vaccines      Other reaction(s): Seizures     Sertraline      Other reaction(s): Dizziness       Reviewed and updated as needed this visit by clinical staff  Tobacco  Allergies  Meds  Problems  Med Hx  Surg Hx  Fam Hx  Soc Hx        Reviewed and  "updated as needed this visit by Provider  Tobacco  Allergies  Meds  Problems  Med Hx  Surg Hx  Fam Hx  Soc Hx          ROS:  As above    OBJECTIVE:     /80   Pulse 88   Temp 98.1  F (36.7  C) (Temporal)   Resp 18   Ht 1.549 m (5' 1\")   Wt 55.6 kg (122 lb 8 oz)   SpO2 98%   BMI 23.15 kg/m    Body mass index is 23.15 kg/m .  GENERAL: healthy, alert and no distress  EYES: Eyes grossly normal to inspection, PERRL and conjunctivae and sclerae normal  HENT: ear canals and TM's normal, nose and mouth without ulcers or lesions  NECK: no adenopathy, no asymmetry, masses, or scars and thyroid normal to palpation  RESP: lungs clear to auscultation - no rales, rhonchi or wheezes  CV: regular rate and rhythm, normal S1 S2, no S3 or S4, no murmur, click or rub, no peripheral edema and peripheral pulses strong  ABDOMEN: soft, nontender, no hepatosplenomegaly, no masses and bowel sounds normal  MS: normal muscle tone, normal range of motion, no cyanosis, clubbing, or edema, no edema, peripheral pulses normal, tenderness to palpation neck and upper back, neck exam shows normal strength and ROM is normal and tension noted in neck and upper back  SKIN: no suspicious lesions or rashes  NEURO: Normal strength and tone, mentation intact and speech normal  PSYCH: mentation appears normal, affect normal/bright    Diagnostic Test Results:  Head CT has been ordered, will be scheduled.     ASSESSMENT/PLAN:     1. History of migraine headaches  Sounds like a mixture of tension and migraine headache, significant history of headaches since age 5. Will try Skelaxin and 800mg motrin at onset of increased tension, dark room, quiet, and cold/heat. Neurology referral placed, has never been evaluated for headaches. Will obtain head CT with contrast prior to consult.   - metaxalone (SKELAXIN) 800 MG tablet; Take 1 tablet (800 mg) by mouth 3 times daily as needed for other (muscle tension leading to headache)  Dispense: 30 tablet; " Refill: 0  - NEUROLOGY ADULT REFERRAL  - CT Head w/o & w Contrast; Future    2. Tobacco abuse  3. Tobacco abuse counselin  Nicotrol inhaler writted. Patient is not interested in Quitplan referral at this time.   - Tobacco Cessation - Order to Satisfy Health Maintenance  - nicotine (NICOTROL) 10 MG inhaler; Inhale 6-16 Cartridges into the lungs daily as needed for smoking cessation  Dispense: 6 each; Refill: 5    Florinda Martinez NP  [facility] AND [facility]

## 2019-03-25 NOTE — PATIENT INSTRUCTIONS
"  Nicotine Inhaler    Dosing:    Weeks 1-12 Use 6-16 cartridges per day. Maximum of 16 cartridges per day.   Weeks 12-24 Gradually reduce the number of cartridges per day.     How to use the nicotine Inhaler:    Remove the mouthpiece from the plastic wrap and push the top and bottom pieces together. Turn them to line up the markings and pull the top and bottom apart.     Insert one cartridge into the inhaler. Push hard until it pops into place.    Line up the markings again and push the top and bottom back together.    Turn the pieces so the markings do not line up and the inhaler is \"locked\".    Inhale deeply into back of throat or puff in short breaths.    You will soon learn to be able to tell when no nicotine is left in the cartridge. Once you feel you are no longer getting any nicotine, change the cartridge.    Take off the top of the mouthpiece and throw away the cartridge.    Especially at the beginning, use the inhaler whenever you would normally smoke a cigarette.    Some tips:    Do not smoke while you are using the nicotine inhaler.    Each cartridge will last for 20 minutes of puffing.    Puff on the inhaler until your craving resolves. If used for less than 20 minutes, leave the cartridge in the inhaler and save for your next use.     Try different schedules to see what works best for you--try puffing for a full 20 minutes, or try puffing for 5 minutes at a time. The cartridge will last for FOUR 5 minute sessions.    Keep inhaler and cartridges out of reach of children.    Store the inhaler at room temperature.    Clean the mouthpiece regularly with soap and water.     As your body becomes less dependent on nicotine, you will need to use less cartridges.    Follow up with your health care professional about any questions you have and to get help with tapering you dose of the nicotine inhaler.    Side Effects:  Some people experience mild irritation of the mouth and throat in the first few days. These " should resolve with time. If you experience any other troublesome or unusual side effects, call your health care professional.  HOW TO QUIT SMOKING  Smoking is one of the hardest habits to break. About half of all those who have ever smoked have been able to quit, and most of those (about 70%) who still smoke want to quit. Here are some of the best ways to stop smoking.     KEEP TRYING:  It takes most smokers about 8 tries before they are finally able to fully quit. So, the more often you try and fail, the better your chance of quitting the next time! So, don't give up!    GO COLD TURKEY:  Most ex-smokers quit cold turkey. Trying to cut back gradually doesn't seem to work as well, perhaps because it continues the smoking habit. Also, it is possible to fool yourself by inhaling more while smoking fewer cigarettes. This results in the same amount of nicotine in your body!    GET SUPPORT:  Support programs can make an important difference, especially for the heavy smoker. These groups offer lectures, methods to change your behavior and peer support. Call the free national Quitline for more information. 800-QUIT-NOW (043-083-8449). Low-cost or free programs are offered by many hospitals, local chapters of the American Lung Association (733-616-0075) and the American Cancer Society (217-192-4970). Support at home is important too. Non-smokers can help by offering praise and encouragement. If the smoker fails to quit, encourage them to try again!    OVER-THE-COUNTER MEDICINES:  For those who can't quit on their own, Nicotine Replacement Therapy (NRT) may make quitting much easier. Certain aids such as the nicotine patch, gum and lozenge are available without a prescription. However, it is best to use these under the guidance of your doctor. The skin patch provides a steady supply of nicotine to the body. Nicotine gum and lozenge gives temporary bursts of low levels of nicotine. Both methods take the edge off the craving  for cigarettes. WARNING: If you feel symptoms of nicotine overdose, such as nausea, vomiting, dizziness, weakness, or fast heartbeat, stop using these and see your doctor.    PRESCRIPTION MEDICINES:  After evaluating your smoking patterns and prior attempts at quitting, your doctor may offer a prescription medicine such as bupropion (Zyban, Wellbutrin), varenicline (Chantix, Champix), a niocotine inhaler or nasal spray. Each has its unique advantage and side effects which your doctor can review with you.    HEALTH BENEFITS OF QUITTING:  The benefits of quitting start right away and keep improving the longer you go without smokin minutes: blood pressure and pulse return to normal  8 hours: oxygen levels return to normal  2 days: ability to smell and taste begins to improve as damaged nerves start to regrow  2-3 weeks: circulation and lung function improves  1-9 months: decreased cough, congestion and shortness of breath; less tired  1 year: risk of heart attack decreases by half  5 years: risk of lung cancer decreases by half; risk of stroke becomes the same as a non-smoker  For information about how to quit smoking, visit the following links:  National Cancer [place] ,   Clearing the Air, Quit Smoking Today   - an online booklet. http://www.smokefree.gov/pubs/clearing_the_air.pdf  Smokefree.gov http://smokefree.gov/  QuitNet http://www.quitnet.com/    4537-7325 [employer], [address] [place] [address]. All rights reserved. This information is not intended as a substitute for professional medical care. Always follow your healthcare professional's instructions.    The Benefits of Living Smoke Free  What do you want to gain from quitting? Check off some reasons to quit.  Health Benefits  ___ Reduce my risk of lung cancer, heart disease, chronic lung disease  ___ Have fewer wrinkles and softer skin  ___ Improve my sense of taste and smell  ___ For pregnant women--reduce the risk of having a  miscarriage, stillbirth, premature birth, or low-birth-weight baby  Personal Benefits  ___ Feel more in control of my life  ___ Have better-smelling hair, breath, clothes, home, and car  ___ Save time by not having to take smoke breaks, buy cigarettes, or hunt for a light  ___ Have whiter teeth  Family Benefits  ___ Reduce my children s respiratory tract infections  ___ Set a good example for my children  ___ Reduce my family s cancer risk  Financial Benefits  ___ Save hundreds of dollars each year that would be spent on cigarettes  ___ Save money on medical bills  ___ Save on life, health, and car insurance premiums    Those Dollars Add Up!  Cigarettes are expensive, and getting more expensive all the time. Do you realize how much money you are spending on cigarettes per year? What is the average amount you spend on a pack of cigarettes? What is the average number of packs that you smoke per day? Using your answers to these questions, fill in this formula to help you find out:  ($ _____ per pack) ×  ( _____ number of packs per day) × (365 days) =  $ _____ yearly cost of smoking  Besides tobacco, there are other costs, including extra cleaning bills and replacement costs for clothing and furniture; medical expenses for smoking-related illnesses; and higher health, life, and car insurance premiums.    Cigars and Pipes Count Too!  Cigars and pipes are also dangerous. So are smokeless (chewing) tobacco and snuff. All of these products contain nicotine, a highly addictive substance that has harmful effects on your body. Quitting smoking means giving up all tobacco products.      9275-5591 [employer] [facility], [address] [place] [address], [place] [address]. All rights reserved. This information is not intended as a substitute for professional medical care. Always follow your healthcare professional's instructions.

## 2019-03-26 ASSESSMENT — ANXIETY QUESTIONNAIRES: GAD7 TOTAL SCORE: 17

## 2019-07-03 ENCOUNTER — MYC MEDICAL ADVICE (OUTPATIENT)
Dept: FAMILY MEDICINE | Facility: OTHER | Age: 34
End: 2019-07-03

## 2019-07-11 ASSESSMENT — PATIENT HEALTH QUESTIONNAIRE - PHQ9
10. IF YOU CHECKED OFF ANY PROBLEMS, HOW DIFFICULT HAVE THESE PROBLEMS MADE IT FOR YOU TO DO YOUR WORK, TAKE CARE OF THINGS AT HOME, OR GET ALONG WITH OTHER PEOPLE: NOT DIFFICULT AT ALL
SUM OF ALL RESPONSES TO PHQ QUESTIONS 1-9: 5

## 2019-07-12 NOTE — TELEPHONE ENCOUNTER
Called patient and her answer for question 9 was incorrect and she is doing fine no concerns for self harm.  Changed answer to reflect this.  Norma J. Gosselin LPN......  7/12/2019   8:51 AM

## 2019-08-15 ENCOUNTER — OFFICE VISIT (OUTPATIENT)
Dept: FAMILY MEDICINE | Facility: OTHER | Age: 34
End: 2019-08-15
Attending: FAMILY MEDICINE
Payer: COMMERCIAL

## 2019-08-15 VITALS
SYSTOLIC BLOOD PRESSURE: 118 MMHG | WEIGHT: 126.2 LBS | RESPIRATION RATE: 16 BRPM | TEMPERATURE: 98.2 F | DIASTOLIC BLOOD PRESSURE: 78 MMHG | BODY MASS INDEX: 23.85 KG/M2 | HEART RATE: 72 BPM

## 2019-08-15 DIAGNOSIS — Z72.0 TOBACCO ABUSE: ICD-10-CM

## 2019-08-15 DIAGNOSIS — Z30.432 ENCOUNTER FOR IUD REMOVAL: Primary | ICD-10-CM

## 2019-08-15 PROCEDURE — 58301 REMOVE INTRAUTERINE DEVICE: CPT | Performed by: FAMILY MEDICINE

## 2019-08-15 RX ORDER — VARENICLINE TARTRATE 1 MG/1
1 TABLET, FILM COATED ORAL 2 TIMES DAILY
Qty: 60 TABLET | Refills: 1 | Status: SHIPPED | OUTPATIENT
Start: 2019-08-15 | End: 2020-03-31

## 2019-08-15 ASSESSMENT — PAIN SCALES - GENERAL: PAINLEVEL: NO PAIN (0)

## 2019-08-15 NOTE — PROGRESS NOTES
IUD Removal:  SUBJECTIVE:    Is a pregnancy test required: No.  Was a consent obtained?  No    Sofya Wise is a 34 year old female,, No LMP recorded. who presents today for IUD removal. Her current IUD was placed 2018. She has had problems including depression, mood instability; despite trial with overlapping CHC, counselling and anti-depressants in efforts to keep LARC, with the IUD. She requests removal of the IUD because of problems stated above    Today's PHQ-2 Score: No flowsheet data found.     Would like to retry Chantix.  Is smoking more due to anxiety of upcoming wedding.  This is bothering her and her fiancee.  Does have a history of depression; but will take with caution.    PROCEDURE:  A speculum exam was performed and the cervix was visualized. The IUD string was visualized. Using ring forceps, the string was grasped and the IUD removed intact.    POST PROCEDURE:  The patient tolerated the procedure well. Patient was discharged in stable condition.    Call if bleeding, pain or fever occur. and Birth control counseling given.    1. Encounter for IUD removal  - REMOVE INTRAUTERINE DEVICE    2. Tobacco abuse  Trial of chantix.  R/B/O, including suicidal or psychotic tendencies as a low risk.  Encouraged to stop right away if any mood changes do develop.  12 weeks of treatment maximum.  - varenicline (CHANTIX STARTING MONTH HILTON) 0.5 MG X 11 & 1 MG X 42 tablet; Take 0.5 mg tab daily for 3 days, THEN 0.5 mg tab twice daily for 4 days, THEN 1 mg twice daily.  Dispense: 53 tablet; Refill: 0  - varenicline (CHANTIX CONTINUING MONTH HILTON) 1 MG tablet; Take 1 tablet (1 mg) by mouth 2 times daily  Dispense: 60 tablet; Refill: 1    Yael Whyte,

## 2019-11-22 DIAGNOSIS — Z86.69 HISTORY OF MIGRAINE HEADACHES: ICD-10-CM

## 2019-11-26 RX ORDER — METAXALONE 800 MG/1
TABLET ORAL
Qty: 30 TABLET | Refills: 0 | OUTPATIENT
Start: 2019-11-26

## 2020-01-30 ENCOUNTER — ALLIED HEALTH/NURSE VISIT (OUTPATIENT)
Dept: OBGYN | Facility: OTHER | Age: 35
End: 2020-01-30
Attending: FAMILY MEDICINE
Payer: COMMERCIAL

## 2020-01-30 VITALS — WEIGHT: 125.5 LBS | BODY MASS INDEX: 24.64 KG/M2 | HEIGHT: 60 IN

## 2020-01-30 DIAGNOSIS — O36.80X0 ENCOUNTER TO DETERMINE FETAL VIABILITY OF PREGNANCY, SINGLE OR UNSPECIFIED FETUS: Primary | ICD-10-CM

## 2020-01-30 DIAGNOSIS — Z32.00 POSSIBLE PREGNANCY, NOT YET CONFIRMED: ICD-10-CM

## 2020-01-30 LAB — HCG UR QL: POSITIVE

## 2020-01-30 PROCEDURE — 81025 URINE PREGNANCY TEST: CPT | Mod: ZL | Performed by: OBSTETRICS & GYNECOLOGY

## 2020-01-30 PROCEDURE — 99207 ZZC OB VISIT-NO CHARGE - GICH ONLY: CPT

## 2020-01-30 RX ORDER — BACLOFEN 20 MG
1 TABLET ORAL DAILY
COMMUNITY
End: 2021-12-21

## 2020-01-30 RX ORDER — PRENATAL VIT/IRON FUM/FOLIC AC 27MG-0.8MG
1 TABLET ORAL DAILY
COMMUNITY
End: 2021-01-07

## 2020-01-30 ASSESSMENT — MIFFLIN-ST. JEOR: SCORE: 1200.76

## 2020-01-30 ASSESSMENT — ANXIETY QUESTIONNAIRES
GAD7 TOTAL SCORE: 13
7. FEELING AFRAID AS IF SOMETHING AWFUL MIGHT HAPPEN: MORE THAN HALF THE DAYS
1. FEELING NERVOUS, ANXIOUS, OR ON EDGE: NEARLY EVERY DAY
IF YOU CHECKED OFF ANY PROBLEMS ON THIS QUESTIONNAIRE, HOW DIFFICULT HAVE THESE PROBLEMS MADE IT FOR YOU TO DO YOUR WORK, TAKE CARE OF THINGS AT HOME, OR GET ALONG WITH OTHER PEOPLE: SOMEWHAT DIFFICULT
3. WORRYING TOO MUCH ABOUT DIFFERENT THINGS: MORE THAN HALF THE DAYS
2. NOT BEING ABLE TO STOP OR CONTROL WORRYING: MORE THAN HALF THE DAYS
6. BECOMING EASILY ANNOYED OR IRRITABLE: SEVERAL DAYS
5. BEING SO RESTLESS THAT IT IS HARD TO SIT STILL: SEVERAL DAYS

## 2020-01-30 ASSESSMENT — PATIENT HEALTH QUESTIONNAIRE - PHQ9
5. POOR APPETITE OR OVEREATING: MORE THAN HALF THE DAYS
SUM OF ALL RESPONSES TO PHQ QUESTIONS 1-9: 7

## 2020-01-30 NOTE — NURSING NOTE
HPI:    This is a 34 year old female patient,  who presents for Pregnancy confirmation visit. Patient reports positive pregnancy test at home.     Obstetrical history, OB Questionnaire, and OB Demographics updated to the best of this nurse's ability based on patient report. PHQ-9 depression screening and routine Domestic Abuse screening completed. OB Education packet provided and reviewed by this nurse. All immediate questions and concerns answered.    Last menstrual period is reported as Patient's last menstrual period was 2019 (exact date).. COLIN based on LMP is 10/4/2020.   Her cycles are regular.  Her last menstrual period was abnormal, very short and light.   Since her LMP, she has experienced  vaginal discharge, urinary frequency and cramping, increased.     Personal OB history includes: age of first pregnancy 32, natural births 1, G  2 and P  1  Previous OB Provider: Dr. Claudia Morfin MD   Previous Delivering Clinic: [facility]  Release of Records: n/a    Current delivery plan: [facility]  Preferred OB Provider: Dr. Claudia Morfin MD or Yael Whyte DO   Current Primary Care Provider: Yael Whyte DO   Pediatrician: Susan Grove MD     Additional History:  delivery at 35w6d, hx of PPD and anxiety. Not currently medicated.    Have you travelled during the pregnancy?No  Have your sexual partner(s) travelled during the pregnancy?No      HISTORY:   Planned Pregnancy: Yes  Marital Status:   Occupation: Clinic  - [facility]  Living in Household: Spouse and Children    Father of the baby is involved.   Family and father of baby is supportive of current pregnancy.  Past Medical History of Father of Baby:No significant medical history    Past History:  Her past medical history   Past Medical History:   Diagnosis Date     Dysplasia of cervix uteri     cryotherapy .     Family history of other specified conditions (CODE)     following Pertussis vaccination, last seizure  1991.     Major depressive disorder, single episode     No Comments Provided     Migraine without status migrainosus, not intractable     No Comments Provided     Other specified postprocedural states     at [facility], abnormal pap smears times 2.  Two sexual partners in her left.   .      She has a history of  pre-term delivery at 35w6d weeks and pre-term labor    Since her last LMP she admits to the use of tobacco.    Pap smear history: YES - updated in Problem List and Health Maintenance accordingly    STD/STI history: No STD history    STD/STI symptoms: discharge     Past medical, surgical, social and family history were reviewed and updated in EPIC.    Medications reviewed by this nurse. Current medication list:  Current Outpatient Medications   Medication Sig Dispense Refill     cholecalciferol (VITAMIN D3) 5000 units (125 mcg) capsule Take 5,000 Units by mouth daily       Magnesium Oxide 500 MG TABS Take 1 tablet by mouth daily       Prenatal Vit-Fe Fumarate-FA (PRENATAL MULTIVITAMIN W/IRON) 27-0.8 MG tablet Take 1 tablet by mouth daily       ALPRAZolam (XANAX) 0.25 MG tablet Take 1 tablet (0.25 mg) by mouth 2 times daily as needed for anxiety (Patient not taking: Reported on 3/25/2019) 90 tablet 1     varenicline (CHANTIX CONTINUING MONTH PAK) 1 MG tablet Take 1 tablet (1 mg) by mouth 2 times daily (Patient not taking: Reported on 1/30/2020) 60 tablet 1     varenicline (CHANTIX STARTING MONTH PAK) 0.5 MG X 11 & 1 MG X 42 tablet Take 0.5 mg tab daily for 3 days, THEN 0.5 mg tab twice daily for 4 days, THEN 1 mg twice daily. (Patient not taking: Reported on 1/30/2020) 53 tablet 0     The following medications were recommended to be discontinued due to Pregnancy Category D status: not taking any class C or above  Patient informed to contact her primary care provider as soon as possible to discuss a safer alternative.    Risk factors:  Moderate and moderately severe risks (consult with OB/Gyn)  Previous  fetal or  demise: No  History of  delivery: Yes  History of heart disease Class I: No  Severe anemia, unresponsive to iron therapy: No  Pelvic mass or neoplasm: No  Previous : No  Hyper/hypothyroidism: No  History of postpartum hemorrhage requiring transfusion:No  History of Placenta Accreta: No    High Risk (Pregnancy managed by OB/Gyn)  Multiple pregnancy: No  Pre-gestational diabetes: No  Chronic Hypertension: No  Renal Failure: No  Heart disease, class II or greater: No  Rh Isoimmunization: No  Chronic active hepatitis: No  Convulsive disorder, poorly controlled: No  Isoimmune thrombocytopenia: No  Pre-term premature rupture of membranes: Yes  Lupus or other autoimmune disorder: No  Human Immunodeficiency Virus: No      ASSESSMENT/PLAN:       ICD-10-CM    1. Encounter to determine fetal viability of pregnancy, single or unspecified fetus O36.80X0 HCG qualitative urine   2. Possible pregnancy, not yet confirmed Z32.00  OB < 14 Weeks Single     HCG Qual Urine   Date Value Ref Range Status   2020 Positive (A) NEG^Negative Final     Comment:     This test is for screening purposes.  Results should be interpreted along with   the clinical picture.  Confirmation testing is available if warranted by   ordering [id number], HCG Quantitative Pregnancy.       34 year old , 4w4d of pregnancy with COLIN of 10/4/2020, by Last Menstrual Period    Urine pregnancy test completed during this visit, results were as noted above.     Per standing orders and scope of practice of this nurse, patient will have the following orders placed and completed prior to initial OB visit with the appropriate provider:    --early ultrasound for dating and viability ordered for approximately 6-7 weeks gestation based on LMP    --Quantitative Beta HCG and progesterone monitoring if indicated    Counseling given:     - Recommended weight gain for pregnancy: 25-35 lbs.   BMI < 18.5  28-40 lbs   18.5 - 24.9 25-35   25 -  29.9 15-25   > 30  11-20      PLAN/PATIENT INSTRUCTIONS:    Follow up in 4 - 6 weeks.  Normal exercise.  Normal sexual activity.  Prenatal vitamins.  Anticipated weight gain.    follow-up appointment with Dr. Claudia oMrfin MD  for pre- care, take multivitamin or pre- vitamins and OB Education packet given, discussed [place] for prevention of  delivery.    Ruth Valera RN.................................................. 2020 11:12 AM

## 2020-01-30 NOTE — PATIENT INSTRUCTIONS
Follow up in 4 - 6 weeks.  Normal exercise.  Normal sexual activity.  Prenatal vitamins.  Anticipated weight gain.     follow-up appointment with Dr. Claudia Morfin MD  for pre-edith care, take multivitamin or pre- vitamins and OB Education packet given, discussed [place] for prevention of  delivery.

## 2020-01-31 ASSESSMENT — ANXIETY QUESTIONNAIRES: GAD7 TOTAL SCORE: 13

## 2020-03-03 ENCOUNTER — HOSPITAL ENCOUNTER (OUTPATIENT)
Dept: ULTRASOUND IMAGING | Facility: OTHER | Age: 35
Discharge: HOME | End: 2020-03-03
Attending: OBSTETRICS & GYNECOLOGY | Admitting: OBSTETRICS & GYNECOLOGY
Payer: COMMERCIAL

## 2020-03-03 ENCOUNTER — PRENATAL OFFICE VISIT (OUTPATIENT)
Dept: OBGYN | Facility: OTHER | Age: 35
End: 2020-03-03
Attending: OBSTETRICS & GYNECOLOGY
Payer: COMMERCIAL

## 2020-03-03 VITALS
SYSTOLIC BLOOD PRESSURE: 102 MMHG | HEIGHT: 60 IN | WEIGHT: 128 LBS | DIASTOLIC BLOOD PRESSURE: 60 MMHG | HEART RATE: 72 BPM | BODY MASS INDEX: 25.13 KG/M2

## 2020-03-03 DIAGNOSIS — O09.91 SUPERVISION OF HIGH RISK PREGNANCY IN FIRST TRIMESTER: Primary | ICD-10-CM

## 2020-03-03 DIAGNOSIS — F41.9 ANXIETY: ICD-10-CM

## 2020-03-03 DIAGNOSIS — G43.709 CHRONIC MIGRAINE WITHOUT AURA WITHOUT STATUS MIGRAINOSUS, NOT INTRACTABLE: ICD-10-CM

## 2020-03-03 DIAGNOSIS — O09.891 HISTORY OF PRETERM DELIVERY, CURRENTLY PREGNANT IN FIRST TRIMESTER: ICD-10-CM

## 2020-03-03 DIAGNOSIS — O21.9 NAUSEA AND VOMITING IN PREGNANCY: ICD-10-CM

## 2020-03-03 DIAGNOSIS — Z32.00 POSSIBLE PREGNANCY, NOT YET CONFIRMED: ICD-10-CM

## 2020-03-03 LAB
ABO + RH BLD: NORMAL
ABO + RH BLD: NORMAL
BLD GP AB SCN SERPL QL: NORMAL
BLOOD BANK CMNT PATIENT-IMP: NORMAL
C TRACH DNA SPEC QL PROBE+SIG AMP: NOT DETECTED
ERYTHROCYTE [DISTWIDTH] IN BLOOD BY AUTOMATED COUNT: 11.9 % (ref 10–15)
HCT VFR BLD AUTO: 40.5 % (ref 35–47)
HGB BLD-MCNC: 13.5 G/DL (ref 11.7–15.7)
MCH RBC QN AUTO: 29.3 PG (ref 26.5–33)
MCHC RBC AUTO-ENTMCNC: 33.3 G/DL (ref 31.5–36.5)
MCV RBC AUTO: 88 FL (ref 78–100)
N GONORRHOEA DNA SPEC QL PROBE+SIG AMP: NOT DETECTED
PLATELET # BLD AUTO: 291 10E9/L (ref 150–450)
RBC # BLD AUTO: 4.6 10E12/L (ref 3.8–5.2)
SPECIMEN EXP DATE BLD: NORMAL
SPECIMEN SOURCE: NORMAL
WBC # BLD AUTO: 8.4 10E9/L (ref 4–11)

## 2020-03-03 PROCEDURE — 86900 BLOOD TYPING SEROLOGIC ABO: CPT | Mod: ZL | Performed by: OBSTETRICS & GYNECOLOGY

## 2020-03-03 PROCEDURE — 76801 OB US < 14 WKS SINGLE FETUS: CPT

## 2020-03-03 PROCEDURE — 86762 RUBELLA ANTIBODY: CPT | Mod: ZL | Performed by: OBSTETRICS & GYNECOLOGY

## 2020-03-03 PROCEDURE — 87389 HIV-1 AG W/HIV-1&-2 AB AG IA: CPT | Mod: ZL | Performed by: OBSTETRICS & GYNECOLOGY

## 2020-03-03 PROCEDURE — 87340 HEPATITIS B SURFACE AG IA: CPT | Mod: ZL | Performed by: OBSTETRICS & GYNECOLOGY

## 2020-03-03 PROCEDURE — 87591 N.GONORRHOEAE DNA AMP PROB: CPT | Mod: ZL | Performed by: OBSTETRICS & GYNECOLOGY

## 2020-03-03 PROCEDURE — 86850 RBC ANTIBODY SCREEN: CPT | Mod: ZL | Performed by: OBSTETRICS & GYNECOLOGY

## 2020-03-03 PROCEDURE — 87491 CHLMYD TRACH DNA AMP PROBE: CPT | Mod: ZL | Performed by: OBSTETRICS & GYNECOLOGY

## 2020-03-03 PROCEDURE — 86780 TREPONEMA PALLIDUM: CPT | Mod: ZL | Performed by: OBSTETRICS & GYNECOLOGY

## 2020-03-03 PROCEDURE — 87086 URINE CULTURE/COLONY COUNT: CPT | Mod: ZL | Performed by: OBSTETRICS & GYNECOLOGY

## 2020-03-03 PROCEDURE — 99207 ZZC OB VISIT-NO CHARGE - GICH ONLY: CPT | Performed by: OBSTETRICS & GYNECOLOGY

## 2020-03-03 PROCEDURE — 86901 BLOOD TYPING SEROLOGIC RH(D): CPT | Mod: ZL | Performed by: OBSTETRICS & GYNECOLOGY

## 2020-03-03 PROCEDURE — 36415 COLL VENOUS BLD VENIPUNCTURE: CPT | Mod: ZL | Performed by: OBSTETRICS & GYNECOLOGY

## 2020-03-03 PROCEDURE — 85027 COMPLETE CBC AUTOMATED: CPT | Mod: ZL | Performed by: OBSTETRICS & GYNECOLOGY

## 2020-03-03 RX ORDER — HYDROXYZINE HYDROCHLORIDE 25 MG/1
25 TABLET, FILM COATED ORAL EVERY 6 HOURS PRN
Qty: 30 TABLET | Refills: 1 | Status: SHIPPED | OUTPATIENT
Start: 2020-03-03 | End: 2020-11-03

## 2020-03-03 RX ORDER — ONDANSETRON 4 MG/1
4 TABLET, FILM COATED ORAL EVERY 8 HOURS PRN
Qty: 30 TABLET | Refills: 1 | Status: SHIPPED | OUTPATIENT
Start: 2020-03-03 | End: 2020-11-03

## 2020-03-03 ASSESSMENT — PAIN SCALES - GENERAL: PAINLEVEL: NO PAIN (0)

## 2020-03-03 ASSESSMENT — MIFFLIN-ST. JEOR: SCORE: 1212.1

## 2020-03-03 NOTE — PATIENT INSTRUCTIONS
BIRTH AND 17-alpha hydroxyprogesterone caproate (17P) TREATMENT    What is  birth?      birth is the delivery of a baby before 37 weeks of gestation.  Approximately 1 in every 12 babies in [place] is born premature (that s approximately 6,000 babies every year)!     birth is often unexpected, but can happen for many reasons. There are some known risk factors, which include:   -pregnancy with twins or triplets   -being  race  -some problems with the uterus or cervix   -some medical problems like high blood pressure   -smoking, drinking, or using illegal drugs while pregnant   -infections like urinary tract infection, bacterial vaginosis and chlamydia while    pregnant  -depression, stress, and anxiety    But the biggest risk factor is having a previous  baby. In fact, women who have one  birth have a 30-50% chance of their next baby coming early too.     What are the risks to a baby that delivers prematurely?     birth is the number one cause of  death worldwide. This risk increases the earlier the baby is born.  Prematurity can also cause serious long term health problems for babies such as breathing problems, infections, bleeding into the brain, as well as long term developmental problems like cerebral palsy, learning impairments, hearing and vision problems.    How can I prevent  birth in my pregnancy?  Overall, the best thing to prevent  delivery is to stay healthy during your pregnancy, and follow up with your doctor for your regular visits and screening tests.  If you have a history of  birth in one of your past pregnancies, you may benefit from weekly injections of 17P.     What is 17P?  The full name is 17-alpha hydroxyprogesterone caproate. This is a form of your body s natural  pregnancy hormone , progesterone. The brand name of this is Bhumi, and it is FDA approved for prevention of  birth.  This medication  is given in once weekly injections from week 16-20 through the 36th week of pregnancy. Research shows that women taking 17P can reduce their risk of  birth from 50% to 36%. The treatment has also been shown to reduce the risk of complications after birth, such as bleeding in the brain and need for supplemental oxygen.    It is important to complete the treatment once you start, as the risk of  birth goes up if you stop the injections early.    How can I get started on the Bhumi treatment?  First, you should talk with your doctor to find out if this is a good option for you.  It is safe for pregnant women and for the fetus, but if you have some medical problems like uncontrolled blood pressure, breast cancer, or liver disease you should talk about it with your doctor first.  Your clinic will work with you to help determine insurance coverage and preauthorization if needed.  Then you will schedule weekly visits for 17P injections in addition to your routine prenatal visits with your doctor.    Side Effects of Mekena  The most common side effects  reported by [facility] users are   injection site reactions (pain [35%], swelling  [17%], pruritus (itching) [6%], nodule [5%]), urticaria (rash) (12%), pruritus (generalized itching (8%), nausea (6%), and diarrhea (2%).

## 2020-03-03 NOTE — NURSING NOTE
Chief Complaint   Patient presents with     Prenatal Care     OBPX LMP 12/29/19  9W2D        Medication Reconciliation: completed   Susan Birmingham LPN  3/3/2020 3:25 PM

## 2020-03-03 NOTE — PROGRESS NOTES
New Obstetrics Visit    HPI: 34 year old  at 9w2d by LMP c/w 9w2d US here today for initial OB visit. Her LMP was 19. This was a planned pregnancy. Has been very nauseated and vomiting- more than her first pregnancy. Tried seabands, unisom, B6. All not helping. Would like to try zofran. Her anxiety has been up and down, knows she can't take xanax. Would like to try hydroxyzine again, took that last pregnancy. She stopped chantix when she found out she was pregnant but has been smoking again- up to a 1/4 ppd. Would like to restart chantix as this helped her quit.     OBHx  OB History    Para Term  AB Living   2 1 0 1 0 1   SAB TAB Ectopic Multiple Live Births   0 0 0 0 1      # Outcome Date GA Lbr Torey/2nd Weight Sex Delivery Anes PTL Lv   2 Current            1  18 35w6d 09:42 / 10:38 2.651 kg (5 lb 13.5 oz) F Vag-Forceps EPI Y BRITNEY      Complications: Dysfunctional Labor, Failure to Progress in Second Stage      Name: ROSENDO SOLIS      Apgar1: 8  Apgar5: 9         PMHx:   Past Medical History:   Diagnosis Date     Dysplasia of cervix uteri     cryotherapy .     Family history of other specified conditions (CODE)     following Pertussis vaccination, last seizure .     Major depressive disorder, single episode     No Comments Provided     Migraine without status migrainosus, not intractable     No Comments Provided     Other specified postprocedural states     at [facility], abnormal pap smears times 2.  Two sexual partners in her left.      PSHx:   Past Surgical History:   Procedure Laterality Date     OTHER SURGICAL HISTORY      2 mo,,HERNIA REPAIR      Meds:   Current Outpatient Medications   Medication     cholecalciferol (VITAMIN D3) 5000 units (125 mcg) capsule     Magnesium Oxide 500 MG TABS     Prenatal Vit-Fe Fumarate-FA (PRENATAL MULTIVITAMIN W/IRON) 27-0.8 MG tablet     ALPRAZolam (XANAX) 0.25 MG tablet     varenicline (CHANTIX  CONTINUING MONTH HILTON) 1 MG tablet     varenicline (CHANTIX STARTING MONTH HILTON) 0.5 MG X 11 & 1 MG X 42 tablet     No current facility-administered medications for this visit.      Allergies:     Allergies   Allergen Reactions     Pertussis Vaccines      Other reaction(s): Seizures     Sertraline      Other reaction(s): Dizziness       SocHx:   Social History     Tobacco Use     Smoking status: Current Every Day Smoker     Packs/day: 0.02     Types: Cigarettes     Last attempt to quit: 3/24/2018     Years since quittin.9     Smokeless tobacco: Never Used     Tobacco comment: Quit smoking: is trying, 1-2 a day   Substance Use Topics     Alcohol use: No     Alcohol/week: 1.0 standard drinks     Drug use: No     Comment: Drug use: No     Lives with her , Luis F, and their daughter. Works at [employer].    FamHx:   Family History   Problem Relation Age of Onset     Family History Negative Mother         Good Health     Family History Negative Father         Good Health     Family History Negative Brother         Good Health     Family History Negative Brother         Good Health     Family History Negative Sister         Good Health        ROS: 10-Point ROS negative except as noted in HPI      Physical Exam  /60 (BP Location: Right arm, Patient Position: Sitting, Cuff Size: Adult Regular)   Pulse 72   Ht 1.524 m (5')   Wt 58.1 kg (128 lb)   LMP 2019 (Exact Date)   Breastfeeding No   BMI 25.00 kg/m    Body mass index is 25 kg/m .  Gen: Well-appearing, NAD  HEENT: Normocephalic, atraumatic  Neck: Thyroid is not enlarged, no appreciable masses palpated. Non-tender  CV:  RRR, no m/r/g auscultated  Pulm: CTAB, no w/r/r auscultated  Abd: Soft, non-tender, non-distended  Ext: No LE edema, extremities warm and well perfused    Breast: Symmetric, no nipple discharge or retraction, no axillary lymphadenopathy, no palpable nodules or masses bilaterally. Dense breast tissue bilaterally    Pelvic:  Normal  appearing external female genitalia. No vaginal lesions. Moderate white discharge. Cervix normal, no lesions. Uterus is 9 wk size, mobile, non-tender, retroverted. No adnexal tenderness or masses      Assessment/Plan:  Ms. Sofya Brown is a 34 year old  at 9w2d by LMP c/w 9w2d US, here for new OB visit. Pregnancy is complicated by hx of PPROM at 35w6d, depression and anxiety.  1. Depression, anxiety: will start hydroxyzine prn  2. Hx of PPROM: discussed risks of recurrent  delivery, plan to start Bhumi at 16 weeks  3. Nausea, vomiting: will add zofran  4. Tobacco use: encouraged cessation, can restart chantix  5. New OB labs ord'd  6. Genetics: declines  7. Imaging: dating US at 9w2d  8. Immunizations: s/p flu    Follow up in 4 weeks.    Claudia Morfin MD  OB/GYN  3/3/2020 3:14 PM

## 2020-03-05 LAB
HBV SURFACE AG SERPL QL IA: NONREACTIVE
HIV 1+2 AB+HIV1 P24 AG SERPL QL IA: NONREACTIVE
RUBV IGG SERPL IA-ACNC: 19 IU/ML
T PALLIDUM AB SER QL: NONREACTIVE

## 2020-03-06 ENCOUNTER — HOSPITAL ENCOUNTER (EMERGENCY)
Facility: OTHER | Age: 35
Discharge: HOME | End: 2020-03-06
Attending: EMERGENCY MEDICINE | Admitting: EMERGENCY MEDICINE
Payer: COMMERCIAL

## 2020-03-06 VITALS
OXYGEN SATURATION: 97 % | HEIGHT: 60 IN | BODY MASS INDEX: 24.54 KG/M2 | WEIGHT: 125 LBS | DIASTOLIC BLOOD PRESSURE: 73 MMHG | HEART RATE: 72 BPM | TEMPERATURE: 98.1 F | SYSTOLIC BLOOD PRESSURE: 116 MMHG | RESPIRATION RATE: 18 BRPM

## 2020-03-06 DIAGNOSIS — G44.209 MUSCLE TENSION HEADACHE: ICD-10-CM

## 2020-03-06 DIAGNOSIS — M26.609 TMJ DYSFUNCTION: ICD-10-CM

## 2020-03-06 DIAGNOSIS — Z33.1 PREGNANCY, INCIDENTAL: ICD-10-CM

## 2020-03-06 LAB
BACTERIA SPEC CULT: NO GROWTH
SPECIMEN SOURCE: NORMAL

## 2020-03-06 PROCEDURE — 25000132 ZZH RX MED GY IP 250 OP 250 PS 637: Performed by: EMERGENCY MEDICINE

## 2020-03-06 PROCEDURE — 99283 EMERGENCY DEPT VISIT LOW MDM: CPT | Mod: Z6 | Performed by: EMERGENCY MEDICINE

## 2020-03-06 PROCEDURE — 99283 EMERGENCY DEPT VISIT LOW MDM: CPT | Performed by: EMERGENCY MEDICINE

## 2020-03-06 RX ORDER — CYCLOBENZAPRINE HCL 10 MG
10 TABLET ORAL ONCE
Status: COMPLETED | OUTPATIENT
Start: 2020-03-06 | End: 2020-03-06

## 2020-03-06 RX ADMIN — CYCLOBENZAPRINE HYDROCHLORIDE 10 MG: 10 TABLET, FILM COATED ORAL at 04:08

## 2020-03-06 ASSESSMENT — MIFFLIN-ST. JEOR: SCORE: 1198.5

## 2020-03-06 NOTE — ED PROVIDER NOTES
"  History     Chief Complaint   Patient presents with     Headache     HPI  Sofya Brown is a 34 year old female who patient presents the emergency department by private vehicle for evaluation of headache.    Patient reports that symptoms again around 1500 today.  She describes tension in her upper shoulders and the dorsal aspect of the neck.  She reports it was worse on the left than on the right (see below).  She reports that took hours before she achieved a maximum intensity of the symptoms.  She now complains of an occipital nuchal headache.  Earlier there was some involvement of the left temporal distribution as well but that has since resolved.  She reports she is had nausea with 2 episodes of vomiting.  The last occurred just after she arrived at the waiting area here.  She denies any diplopia scotomata or visual scintillae.  No visual fortification.  She does complain of photophobia.  She also has some phonophobia but no loss of auditory acuity.  No tinnitus or vertigo.  No otalgia or otorrhea.  No recent nasal congestion or drainage.  No odynophagia or dysphagia.  She denies motor or sensory deficits either in a bulbar or peripheral distribution.  She has had chills but denies rigors.  No fever.    She reports has had similar headaches over period of 30 years.  She reports the frequency of the headaches has increased in the last 2-month such that she now has a headache that once a week.  She reports that often they resolve after she goes to sleep, and she typically wakes up headache free.  Otherwise they can last for up to 2 days.  She reports that in the past cyclobenzaprine has produced a roughly 75% reduction in discomfort.  She reports that historically she is also received this along with \"a pain reliever\".    She reports that symptoms got worse during her last pregnancy and immediately thereafter.  She is a  2 para 1 who is now 9 to 10 weeks into her current pregnancy.  She reports it is " the pregnancy that seems to have been associated with the escalation of her headaches.        Allergies:  Allergies   Allergen Reactions     Pertussis Vaccines      Other reaction(s): Seizures     Sertraline      Other reaction(s): Dizziness       Problem List:    Patient Active Problem List    Diagnosis Date Noted     Anxiety state 02/08/2018     Priority: Medium     Migraine without status migrainosus, not intractable 02/08/2018     Priority: Medium     Allergic rhinitis 12/01/2016     Priority: Medium     Overview:   Updated per 10/1/17 IMO import       Major depression, recurrent (H) 05/06/2013     Priority: Medium     Encounter for routine checking of intrauterine contraceptive device (IUD) 04/20/2012     Priority: Medium     Abdominal pain 04/20/2012     Priority: Medium     Other acne 08/18/2011     Priority: Medium        Past Medical History:    Past Medical History:   Diagnosis Date     Dysplasia of cervix uteri      Family history of other specified conditions (CODE)      Major depressive disorder, single episode      Migraine without status migrainosus, not intractable      Other specified postprocedural states        Past Surgical History:    Past Surgical History:   Procedure Laterality Date     OTHER SURGICAL HISTORY      2 mo,,HERNIA REPAIR       Family History:    Family History   Problem Relation Age of Onset     Family History Negative Mother         Good Health     Family History Negative Father         Good Health     Family History Negative Brother         Good Health     Family History Negative Brother         Good Health     Family History Negative Sister         Good Health       Social History:  Marital Status:   [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 Has corrective lenses for near vision, but does not think that the focal length is adjusted for her computer work station.  Social History     Tobacco Use     Smoking status: Current Every Day Smoker     Packs/day: 0.02      Types: Cigarettes     Last attempt to quit: 3/24/2018     Years since quittin.9     Smokeless tobacco: Never Used     Tobacco comment: Quit smoking: is trying, 1-2 a day   Substance Use Topics     Alcohol use: No     Alcohol/week: 1.0 standard drinks     Drug use: No     Comment: Drug use: No        Medications:    cholecalciferol (VITAMIN D3) 5000 units (125 mcg) capsule  hydrOXYzine (ATARAX) 25 MG tablet  Magnesium Oxide 500 MG TABS  Prenatal Vit-Fe Fumarate-FA (PRENATAL MULTIVITAMIN W/IRON) 27-0.8 MG tablet  ALPRAZolam (XANAX) 0.25 MG tablet  ondansetron (ZOFRAN) 4 MG tablet  varenicline (CHANTIX CONTINUING MONTH HILTON) 1 MG tablet  varenicline (CHANTIX STARTING MONTH HILTON) 0.5 MG X 11 & 1 MG X 42 tablet          Review of Systems patient reports she has a history of grinding her teeth at night.  She does have a custom made mouthguard which she reports she uses every night.  She is never been diagnosed with TMJ (see below) .    Physical Exam   BP: 132/80  Pulse: 72  Heart Rate: 72  Temp: 98.1  F (36.7  C)  Resp: 18  Height: 152.4 cm (5')  Weight: 56.7 kg (125 lb)  SpO2: 97 %      Physical Exam The patient's skin is warm and dry. There are no petechiae or purpurae. There is no pallor. There is no jaundice. The skin is intact.  Nailbed capillary refill is brisk. Radial pulses are palpable and are 2+ bilaterally. Dorsalis pedis pulses are also brisk and equal. There is no peripheral edema.  Heart is regular in its rhythm. It is regular in its rate. The patient has a normal S1 and S2. No murmur is noted.   Lungs are clear to auscultation. Airflow is adequate. A few bibasilar rales are noted. These largely cleared once the patient sat up straight and increased tidal volumes. No other adventitious airway sounds noted. The patient is able to speak in full sentences. No lip pursing or expiratory grunting is noted. No accessory muscle use was noted.  Bowel tones are present. There is no high-pitched tinkling. There are  no continuous rushes. Belly is not bloated or distended. There is no guarding. There is no evidence of focal tenderness for the abdomen. No suprapubic distention or tenderness is noted. Luo sign is negative. McBurney's sign is negative. Rovsing's sign is negative. Twin's sign is negative. No discrete mass was identified. I did not appreciate any hepatomegaly or splenomegaly by palpation or by percussion. No ventral hernia was identified.   Carotids are palpable. There is no auscultable bruit.  Tympanic membranes are intact. They are without bulge or erythema-- on either side. External auditory canals are without erythema or edema.   Nares are patent.   The patient's mandible opened in assymmetric fashion.  It deviates to the left on opening.  There is some tenderness principally over the right temporomandibular joint.  She is maximally tender with palpation over the right more so than the left lateral pterygoid.  No significant findings with palpation over the temporalis or the masseter.  There is no trismus. There is no swelling of the lips, tongue, or the uvula. There were no intraoral mucosal lesions identified.  Conjunctivae are neither injected nor pallid. Sclerae are anicteric. Pupils are equal in size. They are 5 mm, OU.  Some mild photophobic response was demonstrated.  Funduscopic examination showed normal disc margins and normal-appearing vessels.  Gaze is conjugate. Purkinje images are well aligned.   Patient is awake and alert. Speech is fluent and clear. Cognition is sufficient to construct a linear narrative. Fund of knowledge is thought to represent the patient's baseline. Affect is slightly constricted. The patient is cooperative and follows simple commands.  Extra-occular motility is intact along transverse, vertical and diagonal planes. There is no ptosis.Pupils are equal in size and are reactive to both direct and indirect photic stimulation. CNs III, IV and VI are intact. CN V reveals that  the patient can open and close the jaw. Sensation is intact in the V1, V2, and V3 distributions, bilaterally. The patient can purse the lips symmetrically. There is symmetrically upturned smile, and the patient can puffs out the cheeks on both sides--confirming the integrity for CN VII. The patient's ability to hear (speech tones) in confirmed. The patient can maintain an stable upright posture--all suggesting that CN VIII is intact. The patient can elevate the soft palate symmetrically. So CN IX and X are still functioning. The patient can selectively deviate the tongue against the right and the left cheeks. So, CN XII is intact.  Patient has 5/5 strength for the extensor digitorum communis, 5/5 for the dorsal interossei, 5/5 strength for the flexor digitorum profundus, 5/5 strength for the flexor digitorum superficialis, 5/5 strength for the brachioradialis, 5/5 for the biceps brachii, and 5/5 for the deltoid --- on the right side.   Patient has 5/5 strength for the extensor digitorum communis, 5/5 for the dorsal interossei, 5/5 strength for the flexor digitorum profundus, 5/5 strength for the flexor digitorum superficialis, 5/5 strength for the brachioradialis, 5/5 for the biceps brachii, and 5/5 for the deltoid--- on the left side.  Sensation is intact for touch from C5-T2.  The patient is able to perform rapid alternating movement test with both hands. The patient has no evidence for dysmetria-with either hand. The patient has no evidence for truncal or limb ataxia.  Cervical active range of motion is full for flexion, extension, rotation right, rotation left, side bending left.  She is a little bit diminished in side bending right and complains of increased pain in the dorsal lateral aspect of her neck with this and worsening of her headache complaints.  Patient has point tenderness for the upper fibers of trapezius right more than left, as well as for the levator scapula, the sternocleidomastoid, and the  "splenius capitis as well as the left semispinalis cervicis.  Palpation of these points does reproduce her presenting pain complaint.  Active range of motion for the shoulders shows that she can fully abduct with external rotation such that she can bring her palms in contact with the ipsilateral occiputs.  She is good for adduction with internal rotation and can bring her thumbs almost to the midline at the level of C5. Seated posture shows the typical \"upper cross\" pattern.    ED Course        Procedures none                       Medications   cyclobenzaprine (FLEXERIL) tablet 10 mg (10 mg Oral Given 3/6/20 0408)       Assessments & Plan (with Medical Decision Making)     I have reviewed the nursing notes.  Patient presented with a headache which seemed similar to her customary pattern of headache. There are no neurologic deficits, No suggestion of increased intracranial pressure, no suggestion of meningism.   The patient was eager to leave, and so did not want to stay until she could evaluate whether the medication was helpful. We reviewed the fact that cyclobenzaprine is category B for pregnancy.  I reminded her the Naprosyn would be acceptable for use up to the 3rd trimester.     I have reviewed the findings, diagnosis, plan and need for follow up with the patient.  I reviewed with her some suggestions about postural correction.    Discharge Medication List as of 3/6/2020  4:14 AM          Final diagnoses:   Muscle tension headache - With involvement of the upper fibers of trapezius, the splenius capitis and the sternocleidomastoid on the right side and the semispinalis cervicis on the left side   Pregnancy, incidental - First trimester   TMJ dysfunction     Plan: Return to emergency department for temperature greater than 101, for rigidity of the neck into flexion, for intractable nausea and vomiting, for focal motor or sensory deficits, for double vision or flashing lights or holes in your vision, for " "vertigo.    Talk to your primary care provider about possible physical therapy referral.  You want to address your tendency towards protraction of shoulders and head forward posture.  Discuss obtaining an occupational therapy evaluation at your workstation to determine if adjustments need to be made in the height of the screen or the closeness of the screen.  You may also wish to talk to your ophthalmologist about getting \"office readers\" so that your corrective lenses have an appropriate focal length for your computer use.    Try to make use of a towel roll at home to try to relax the muscles that seem to be involved tonight.    Until seen by your primary care provider you may wish to avoid chewy or hard foods.    Can use Aleve 2 capsules up to twice a day as needed.  Take with food.  You cannot continue the Aleve into the third trimester of the pregnancy, however.          3/6/2020   [facility]     Joselito AbdiDO  03/16/20 1814    "

## 2020-03-06 NOTE — ED TRIAGE NOTES
Patient presents with headache which began around 3pm. Reports this is similar to migraines she has had in the past. Reports nausea, no vomiting, and light sensitivity. Took tylenol at home PTA.

## 2020-03-06 NOTE — DISCHARGE INSTRUCTIONS
"Return to emergency department for temperature greater than 101, for rigidity of the neck into flexion, for intractable nausea and vomiting, for focal motor or sensory deficits, for double vision or flashing lights or holes in your vision, for vertigo.    Talk to your primary care provider about possible physical therapy referral.  You want to address your tendency towards protraction of shoulders and head forward posture.  Discuss obtaining an occupational therapy evaluation at your workstation to determine if adjustments need to be made in the height of the screen or the closeness of the screen.  You may also wish to talk to your ophthalmologist about getting \"office readers\" so that your corrective lenses have an appropriate focal length for your computer use.    To make use of a towel roll at home to try to relax the muscles that seem to be involved tonight.    Until seen by your primary care provider you may wish to avoid chewy or hard foods.    Can use Aleve 2 capsules up to twice a day as needed.  Take with food.  You cannot continue the Aleve into the third trimester of the pregnancy, however.  "

## 2020-03-11 ENCOUNTER — HEALTH MAINTENANCE LETTER (OUTPATIENT)
Age: 35
End: 2020-03-11

## 2020-03-12 ENCOUNTER — THERAPY VISIT (OUTPATIENT)
Dept: CHIROPRACTIC MEDICINE | Facility: OTHER | Age: 35
End: 2020-03-12
Attending: OBSTETRICS & GYNECOLOGY
Payer: COMMERCIAL

## 2020-03-12 DIAGNOSIS — M99.04 SEGMENTAL AND SOMATIC DYSFUNCTION OF SACRAL REGION: ICD-10-CM

## 2020-03-12 DIAGNOSIS — M62.838 MUSCLE SPASM: ICD-10-CM

## 2020-03-12 DIAGNOSIS — G89.29 UPPER BACK PAIN, CHRONIC: ICD-10-CM

## 2020-03-12 DIAGNOSIS — M99.02 SEGMENTAL AND SOMATIC DYSFUNCTION OF THORACIC REGION: ICD-10-CM

## 2020-03-12 DIAGNOSIS — M54.9 UPPER BACK PAIN, CHRONIC: ICD-10-CM

## 2020-03-12 DIAGNOSIS — M54.2 CERVICALGIA: Primary | ICD-10-CM

## 2020-03-12 DIAGNOSIS — M99.01 SEGMENTAL AND SOMATIC DYSFUNCTION OF CERVICAL REGION: ICD-10-CM

## 2020-03-12 DIAGNOSIS — G43.709 CHRONIC MIGRAINE WITHOUT AURA WITHOUT STATUS MIGRAINOSUS, NOT INTRACTABLE: ICD-10-CM

## 2020-03-12 DIAGNOSIS — M54.50 RIGHT-SIDED LOW BACK PAIN WITHOUT SCIATICA: ICD-10-CM

## 2020-03-12 PROCEDURE — 99213 OFFICE O/P EST LOW 20 MIN: CPT | Mod: 25 | Performed by: CHIROPRACTOR

## 2020-03-12 PROCEDURE — 98941 CHIROPRACT MANJ 3-4 REGIONS: CPT | Mod: AT | Performed by: CHIROPRACTOR

## 2020-03-12 PROCEDURE — 97110 THERAPEUTIC EXERCISES: CPT | Performed by: CHIROPRACTOR

## 2020-03-12 NOTE — PROGRESS NOTES
"The patient's mandible opened in assymmetric fashion.  It deviates to the left on opening.  There is some tenderness principally over the right temporomandibular joint.  She is maximally tender with palpation over the right more so than the left lateral pterygoid.  No significant findings with palpation over the temporalis or the masseter.        3/12/2020  PATIENT:  Sofya Brown is a 34 year old  female presenting for upper back, neck pain, headaches, low back/hip pain    PROBLEM:   Date of Initial Visit for this Episode:  3/12/2020     Visit #1    SUBJECTIVE / HPI:   Upper back, shoulder into neck pain, described as a constant aching tightness.  Chronically she has experienced upper back tension that extends into her neck.  She describes 2 types of headache-a constant dull, mild headache, that is throbbing and tense located occipital.   She also gets frequent migraine headaches with more severe pressure, throbbing all over with photophobia and phonophobia.  This makes it very difficult for her to work on the computer, care for her young daughter or perform household activities. Headaches have her going to bed early with heat or ice pack on her neck and head.   When seen recently in ED for Migraine she had a thorough work up and described her upper neck as really tight and like her \"Head not on right\".    She is currently 10 weeks pregnant. +Nausea and vomiting with pregnancy. +Anxiety and depression.  She feels the symptoms have worsened during pregnancy.  Not regularly exercising.    Duration and Frequency of Pain: Constant 76 to 100% of the time   radiation of pain: into upper neck  Upper back Pain rated currently:  5/10  Pain rated at it's worst: 6/10    Neck  Pain rated currently:  2/10  Pain rated at it's worst: 5/10    Headache  Pain rated currently:  2/10  Pain rated at it's worst: 2/10    Other Health Care Providers seen for this: in ED and OBGyn Dr. Naila Morfin    Previous injury:no    Secondary " "problem is low back painand Hips \" lock\" on her and right side feels numb lying on it or holding daughter on her right hip.     During first pregnancy she expereienced frequent round ligament pain and described baby getting stuck in her pelvis and pushing 12 hours.   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 Harris and xrays \"showed some degeneration\" per her report.  These are not available for review.     Low back and hips  Pain rated currently:  2/10  Pain rated at it's worst: 2/10    She had consulted Sweta Whitehead regarding acupuncture therapy to help her with tension, anxiety and depression during her pregnancy.  She is not able to do out-of-pocket costs and would like to pursue acupuncture treatment with covered provider.    See flowsheets in chart for details.  Neck Disability Index (  Linus ALEX. and Raudel WONG. 1991. All rights reserved.; used with permission) 3/12/2020   SECTION 1 - PAIN INTENSITY 1   SECTION 2 - PERSONAL CARE 0   SECTION 3 - LIFTING 0   SECTION 4 - READING 0   SECTION 5 - HEADACHES 5   SECTION 6 - CONCENTRATION 3   SECTION 7 - WORK 4   SECTION 8 - DRIVING 0   SECTION 9 - SLEEPING 0   SECTION 10 - RECREATION 1   Count 10   Sum 14   Raw Score: /50 14   Neck Disability Index Score: (%) 28     Oswestry ([place]) Questionnaire    OSWESTRY DISABILITY INDEX 3/12/2020   Count 9   Sum 2   Oswestry Score (%) 4.44   Some recent data might be hidden        Functional limitations: Headaches, concentration, work, sleep    Health History as reported by the patient: fair to good    ROS:  The patient denies any current fevers, chills, nausea, vomiting, diarrhea, constipation,dysuria, hematuria, or urinary hesitancy or incontinence.  No shortness of breath, chest pain, or rashes.    Patient Active Problem List   Diagnosis     Other acne     Anxiety state     Encounter for routine checking of intrauterine contraceptive device (IUD)     Major depression, recurrent (H)     Abdominal pain     Migraine without " status migrainosus, not intractable     Allergic rhinitis       PAST MEDICAL HISTORY:  Past Medical History:   Diagnosis Date     Dysplasia of cervix uteri     cryotherapy 01/07.     Family history of other specified conditions (CODE)     following Pertussis vaccination, last seizure 1991.     Major depressive disorder, single episode     No Comments Provided     Migraine without status migrainosus, not intractable     No Comments Provided     Other specified postprocedural states     at [facility], abnormal pap smears times 2.  Two sexual partners in her left.       PAST SURGICAL HISTORY:  Past Surgical History:   Procedure Laterality Date     OTHER SURGICAL HISTORY      2 mo,,HERNIA REPAIR       ALLERGIES:  Allergies   Allergen Reactions     Pertussis Vaccines      Other reaction(s): Seizures     Sertraline      Other reaction(s): Dizziness       CURRENT MEDICATIONS:  Current Outpatient Medications   Medication Sig Dispense Refill     ALPRAZolam (XANAX) 0.25 MG tablet Take 1 tablet (0.25 mg) by mouth 2 times daily as needed for anxiety 90 tablet 1     cholecalciferol (VITAMIN D3) 5000 units (125 mcg) capsule Take 5,000 Units by mouth daily       hydrOXYzine (ATARAX) 25 MG tablet Take 1 tablet (25 mg) by mouth every 6 hours as needed for anxiety 30 tablet 1     Magnesium Oxide 500 MG TABS Take 1 tablet by mouth daily       ondansetron (ZOFRAN) 4 MG tablet Take 1 tablet (4 mg) by mouth every 8 hours as needed for nausea 30 tablet 1     Prenatal Vit-Fe Fumarate-FA (PRENATAL MULTIVITAMIN W/IRON) 27-0.8 MG tablet Take 1 tablet by mouth daily       varenicline (CHANTIX CONTINUING MONTH PAK) 1 MG tablet Take 1 tablet (1 mg) by mouth 2 times daily 60 tablet 1     varenicline (CHANTIX STARTING MONTH HILTON) 0.5 MG X 11 & 1 MG X 42 tablet Take 0.5 mg tab daily for 3 days, THEN 0.5 mg tab twice daily for 4 days, THEN 1 mg twice daily. 53 tablet 0       SOCIAL HISTORY:  Marital Status: .  Children:  "yes.  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       FAMILY HISTORY:  Family History   Problem Relation Age of Onset     Family History Negative Mother         Good Health     Family History Negative Father         Good Health     Family History Negative Brother         Good Health     Family History Negative Brother         Good Health     Family History Negative Sister         Good Health       OBJECTIVE:    DIAGNOSTICS:  No current spinal imaging taken.     PHYSICAL EXAM:     GENERAL APPEARANCE: healthy, alert and no distress \"affect normal/bright\"  SKIN: no suspicious lesions or rashes  NEURO: cranial nerves 2-12 intact, normal gait, symmetric and equal DTRs, normal strength and light touch sensory      MUSCULOSKELETAL:   Posture: Head forward and mild left posterior rotation, Shoulders forward, Pelvis posterior on left  Gait: unremarkable.     Cervical  Head position: Head forward and mild left posterior rotation and chin jutting  AROM:      50/50 flexion   35/45 extension halting   35/45 RLF  40/45 LLF    80/85 RR         85/85 LR     -Maximal Foraminal Compression: -left: on right +focal pain   - Shoulder Depression:    - Distraction:      Tenderness: OCC-C1, C2-3   muscle spasm:  L>R Suboccipital,  R>L SCM, levator scap, trapezius  Motion restriction: Occiput with right rotation, C3 with left rotation and right lateral flexion    Thoracic  AROM:   Halting and restricted flexion, extension, RLF    +Kemps: + thoracic    Tenderness: T1-2, T4-5  Muscle spasm:  Trapezius, rhomboids, pectorals  Motion restriction: T2 with flexion and left rotation, T5 with extension    Lumbar/Pelvic  PSIS/Iliac Crests: Low left PSIS     AROM:     75/90 flexion 30/30 extension    45/45 RLF    45/45 LLF   45/45 RR      45/45 LR    + Kemps: Left sacroiliac  +Gillets: + Left sacroiliac in extension  -Straight leg raise: Focal left sacroiliac  -CLARA:    Prone passive restricted left heel to " buttock + left low back pain    Tenderness: Lumbosacral , L>R  muscle spasm:  QL, extensor paraspinals, gluteals  Motion restriction: Left sacroiliac in extension    +Joint asymmetry and restriction: Occiput, C3, T2, T5, left sacroiliac      ASSESSMENT: Sofya Brown is a 34 year old female with mixed chronic tension headache with frequent migraines with findings of cervical, thoracic segmental and somatic dysfunction.  She also exhibits right low back pain without sciatica with sacral dysfunction.  Her condition is complicated by pregnancy with shifting center of gravity, hormonal changes and present medical history including anxiety, depression, and current smoker.         1. Cervicalgia    2. Chronic migraine without aura without status migrainosus, not intractable    3. Segmental and somatic dysfunction of cervical region    4. Upper back pain, chronic    5. Segmental and somatic dysfunction of thoracic region    6. Right-sided low back pain without sciatica    7. Segmental and somatic dysfunction of sacral region    8. Muscle spasm        PLAN    History and Exam    Modalities:  None performed this visit    CMT:  55420 Chiropractic manipulative treatment 3-4 regions performed   Cervical: Diversified, Occiput, C3 , Supine  Thoracic: Diversified, T2, T5, Supine  Pelvis: Diversified, Sacrum , PSIS Left , Side posture    Therapeutic procedures:The following were demonstrated and practiced:   Stretches -neck range of motion  Strengthening -scapular retraction and head retraction  Total time: 8 minutes    Response to Treatment:  Reduction in symptoms, less pressure  Prognosis: Good    3/12/2020 Plan of Care:  6-8 visits of Chiropractic Care including Spinal Adjustments and/or physiotherapy and active rehabilitation, to include exercises in the office and/or at home to meet care plan goals.     Frequency: 2xweek for up to 2 -4 weeks, then reevaluation in 4-8 visits, to determine progress and further course of  care.    POC discussed and patient agreeable to plan of care.      3/12/2020 Goals:   2 to 4 weeks   Patient will report improved pain.   Patient will report reduced frequency and intensity of headaches by 50%.   Patient will report improved concentration.   Patient will demonstrate an improved ability to complete Activities of Daily Living  as shown by a reported 10-30% reduced score on neck and/or back index.    Patient will demonstrate improved ROM.        INSTRUCTIONS   ice 20 minutes every other hour as needed and stretch as instructed at visit  Patient Instructions   Follow-up with  for chiropractic care and Acupuncture consult      Follow-up:  Return in about 4 days (around 3/16/2020) for Active Care.     Disclaimer: This note consists of symbols derived from keyboarding, dictation and/or voice recognition software. As a result, there may be errors in the script that have gone undetected. Please consider this when interpreting information found in this chart.

## 2020-03-12 NOTE — Clinical Note
Hi Dr. Morfin,  I think Sofya can be helped with spinal adjusting.  She also mentioned interest in using acupuncture to help with her anxiety, depression, tension and headaches.  I did let her know that [employer] [place] insurance policy, does provide coverage if she were to see Karlo Barrow DC who is also licensed in acupuncture.  What are your thoughts on her also doing this?  I am going to copy Dr. Barrow on this case as I am heading out on vacation and returning on March 26.  I will check my inbox a few times though.  Thank you,  Bertha Adams DC on 3/14/2020 at 8:15 PM

## 2020-03-17 ENCOUNTER — THERAPY VISIT (OUTPATIENT)
Dept: CHIROPRACTIC MEDICINE | Facility: OTHER | Age: 35
End: 2020-03-17
Attending: CHIROPRACTOR
Payer: COMMERCIAL

## 2020-03-17 DIAGNOSIS — M54.50 RIGHT-SIDED LOW BACK PAIN WITHOUT SCIATICA: ICD-10-CM

## 2020-03-17 DIAGNOSIS — G43.709 CHRONIC MIGRAINE WITHOUT AURA WITHOUT STATUS MIGRAINOSUS, NOT INTRACTABLE: Primary | ICD-10-CM

## 2020-03-17 DIAGNOSIS — M99.01 SEGMENTAL AND SOMATIC DYSFUNCTION OF CERVICAL REGION: ICD-10-CM

## 2020-03-17 DIAGNOSIS — G89.29 UPPER BACK PAIN, CHRONIC: ICD-10-CM

## 2020-03-17 DIAGNOSIS — M99.03 SEGMENTAL AND SOMATIC DYSFUNCTION OF LUMBAR REGION: ICD-10-CM

## 2020-03-17 DIAGNOSIS — M54.9 UPPER BACK PAIN, CHRONIC: ICD-10-CM

## 2020-03-17 DIAGNOSIS — M54.2 CERVICALGIA: ICD-10-CM

## 2020-03-17 DIAGNOSIS — M99.02 SEGMENTAL AND SOMATIC DYSFUNCTION OF THORACIC REGION: ICD-10-CM

## 2020-03-17 PROCEDURE — 98941 CHIROPRACT MANJ 3-4 REGIONS: CPT | Mod: AT | Performed by: CHIROPRACTOR

## 2020-03-17 PROCEDURE — 97810 ACUP 1/> WO ESTIM 1ST 15 MIN: CPT | Performed by: CHIROPRACTOR

## 2020-03-17 NOTE — PROGRESS NOTES
Visit #:  2    Subjective:  Sofya Brown is a 34 year old female who is seen in f/u up for:        Chronic migraine without aura without status migrainosus, not intractable  Cervicalgia  Segmental and somatic dysfunction of cervical region  Upper back pain, chronic  Segmental and somatic dysfunction of thoracic region  Right-sided low back pain without sciatica  Segmental and somatic dysfunction of lumbar region.     Since last visit on 3/12/2020,  Sofya Brown reports: Noticing improvement of neck, headache, and back pain symptoms following last treatment.  Patient reports no headaches since last encounter with Dr. Bertha Adams DC.  Patient is very pleased with course of chiropractic care at this time.    Patient is interested in pursuing acupuncture therapy in addition to chiropractic services.  Patient reports that she did have a consultation with Ana Serna L.Ac. in [facility] for on going neck/upper back and headache symptoms. Patient was unable to pursue course of treatment due to financial constraints. After consulting with Dr. Adams it was suggested that the patient pursue acupuncture treatment by myself.    Area of chief complaint:  Cervical, Thoracic and Lumbar :  Symptoms are graded at 0-3/10. The quality is described as achey, tight.       Objective:  The following was observed:    Cervical AROM: appears improved, with mild restrictions of right lateral flexion and extension  Lumbar/Thoraci AROM: not assessed      P: palpatory tenderness Present along the left side C1, right side C3, right side T2 and T5, left side L5:    A: static palpation demonstrates intersegmental asymmetry , cervical, thoracic, lumbar  R: motion palpation notes restricted motion, C1 , C3 , T2 , T5  and L5   T: muscle spasm at level(s):  Present of the suboccipitals bilaterally but left side predominant, upper trapezius and levator scapula as well as of the paraspinal musculature of the CT junction.  Taut tender fibers  noted of the right rhomboids and of the right quadratus lumborum:      Segmental spinal dysfunction/restrictions found at:  :  C1 Right rotation restricted and Left lateral flexion restricted  C3 Right rotation restricted and Extension restriction  T2 Extension restriction  T5 Left rotation restricted and Extension restriction  L5 Right rotation restricted, Left lateral flexion restricted and Extension restriction.      Assessment: Patient originally began chiropractic care under the direction of Dr. Bertha Adams DC.  And Dr. Adams's absence I am the attending physician.  Based off of Dr. Adams's initial exam notes as well as subjective evaluation of the patient today patient does appear to making quite a bit of progress at this time.  Patient was also referred to me for acupuncture therapy consultation and possible treatment.  Based off of review of patient's health records as well as physical examination I do believe patient would respond favorably with course of acupuncture therapy in addition to chiropractic care.  Acupuncture therapy is meant to promote enhance efficacy of chiropractic adjustment helping to reduce patient's neck and headache symptoms.    Plan of care for acupuncture services to include twice week care for up to 2 weeks barring acute exacerbation of symptoms at this time.    Diagnoses:      1. Chronic migraine without aura without status migrainosus, not intractable    2. Cervicalgia    3. Segmental and somatic dysfunction of cervical region    4. Upper back pain, chronic    5. Segmental and somatic dysfunction of thoracic region    6. Right-sided low back pain without sciatica    7. Segmental and somatic dysfunction of lumbar region        Patient's condition:  Patient had restrictions pre-manipulation and Patient is noticing a decrease in their symptoms    Treatment effectiveness:  Post manipulation there is better intersegmental movement, Patient claims to feel looser post manipulation and  Patients symptoms are getting better      Procedures:  CMT:  44557 Chiropractic manipulative treatment 3-4 regions performed   Cervical: Diversified, C1 , C3 , Supine  Thoracic: Diversified, T2, T5, Prone  Lumbar: Diversified, L5, Side posture    Modalities:  76081: Acupuncture, for 15 minutes:  Points: BL 10, 11, GB 20, GV 21, 16, 14, Auriculopoints following [facility] protocol  For 15 minutes    Therapeutic procedures:  None    Response to Treatment  Reduction in symptoms as reported by patient    Prognosis: Excellent    Progress towards Goals:Patient will report improved pain. In progress              Patient will report reduced frequency and intensity of headaches by 50%. In progress              Patient will report improved concentration. Not assessed              Patient will demonstrate an improved ability to complete Activities of Daily Living   as shown by a reported 10-30% reduced score on neck and/or back index.  Not assessed              Patient will demonstrate improved ROM. In progress    Recommendations:    Instructions:ice 20 minutes every other hour as needed and heat 15 minutes every other hour as needed    Follow-up:  Return to care in 2-3 days.

## 2020-03-19 ENCOUNTER — THERAPY VISIT (OUTPATIENT)
Dept: CHIROPRACTIC MEDICINE | Facility: OTHER | Age: 35
End: 2020-03-19
Attending: CHIROPRACTOR
Payer: COMMERCIAL

## 2020-03-19 DIAGNOSIS — G89.29 UPPER BACK PAIN, CHRONIC: ICD-10-CM

## 2020-03-19 DIAGNOSIS — M54.2 CERVICALGIA: ICD-10-CM

## 2020-03-19 DIAGNOSIS — G43.709 CHRONIC MIGRAINE WITHOUT AURA WITHOUT STATUS MIGRAINOSUS, NOT INTRACTABLE: Primary | ICD-10-CM

## 2020-03-19 DIAGNOSIS — M99.01 SEGMENTAL AND SOMATIC DYSFUNCTION OF CERVICAL REGION: ICD-10-CM

## 2020-03-19 DIAGNOSIS — M99.02 SEGMENTAL AND SOMATIC DYSFUNCTION OF THORACIC REGION: ICD-10-CM

## 2020-03-19 DIAGNOSIS — M54.9 UPPER BACK PAIN, CHRONIC: ICD-10-CM

## 2020-03-19 PROCEDURE — 97810 ACUP 1/> WO ESTIM 1ST 15 MIN: CPT | Performed by: CHIROPRACTOR

## 2020-03-19 PROCEDURE — 98940 CHIROPRACT MANJ 1-2 REGIONS: CPT | Mod: AT | Performed by: CHIROPRACTOR

## 2020-03-19 NOTE — PROGRESS NOTES
Visit #:  3    Subjective:  Sofya Brown is a 34 year old female who is seen in f/u up for:        Chronic migraine without aura without status migrainosus, not intractable  Cervicalgia  Segmental and somatic dysfunction of cervical region  Upper back pain, chronic  Segmental and somatic dysfunction of thoracic region.     Since last visit on 3/17/2020,  Sofya Brown reports: all symptoms did show improvement, however neck and upper back pain worsened yesterday.  Patient reports being somewhat dehydrated this morning and was curious if this may have contributed to aggravation of neck and upper back pain symptoms.  Following acupuncture treatment patient noted she did feel somewhat sluggish and drowsy.  Patient unsure if this provided much if any extra benefit.    Area of chief complaint:  Cervical and Thoracic :  Symptoms are graded at 6/10. The quality is described as achey.    No lower back pain present currently     Objective:  The following was observed:    P: palpatory tenderness Present right side T2, T8 midline, left side C1, right side C3:    A: static palpation demonstrates intersegmental asymmetry , cervical, thoracic  R: motion palpation notes restricted motion, C1 , C3 , T2  and T8   T: muscle spasm at level(s): T-spine paraspinals bilaterally, upper trapezius and levator scapula bilaterally.  Taut tender fibers noted of the cervical paraspinal musculature and suboccipitals bilaterally:      Segmental spinal dysfunction/restrictions found at:  :  C1 Right rotation restricted and Left lateral flexion restricted  C3 Right lateral flexion restricted and Extension restriction  T2 Right lateral flexion restricted and Extension restriction  T8 Extension restriction.      Assessment: Subjective evaluation shows elevated levels of pain especially of the neck and upper back.  Patient's lumbopelvic region did not show evidence to support segmental/somatic dysfunction present which I do believe to be a  positive sign of effective treatment for the patient.  I do also believe that patient's current dehydration state contributing to aggravation of symptoms as we are seeing at this time.  I did inform patient that it is not uncommon for symptoms to show a little improvement after the first round of acupuncture.  Patient seemed assured by this fact.  Recommend continuation of twice week care next week.    Diagnoses:      1. Chronic migraine without aura without status migrainosus, not intractable    2. Cervicalgia    3. Segmental and somatic dysfunction of cervical region    4. Upper back pain, chronic    5. Segmental and somatic dysfunction of thoracic region        Patient's condition:  Patient had restrictions pre-manipulation and Symptoms come and go    Treatment effectiveness:  Post manipulation there is better intersegmental movement, Patient claims to feel looser post manipulation and Symptoms appear to be decreasing      Procedures:  CMT:  26419 Chiropractic manipulative treatment 1-2 regions performed   Cervical: Diversified, C1 , C3 , Supine  Thoracic: Diversified, T2, T8, Prone    Modalities:  09582: Acupuncture, for 15 minutes:  Points: Bl10, 11, 12, GB 20, CV 14, 16, auriculopoints following [facility] protocol  For 15 minutes    Therapeutic procedures:  None    Response to Treatment  Reduction in symptoms as reported by patient    Prognosis: Good    Progress towards Goals: Patient will report improved pain. In progress              Patient will report reduced frequency and intensity of headaches by 50%. In progress              Patient will report improved concentration. Not assessed              Patient will demonstrate an improved ability to complete Activities of Daily Living   as shown by a reported 10-30% reduced score on neck and/or back index.  Not assessed              Patient will demonstrate improved ROM. In progress    Recommendations:    Instructions:drink plenty of water, continue to monitor  symptoms and perform activities as tolerated    Follow-up:  Return to care in 5 days.

## 2020-03-31 ENCOUNTER — PRENATAL OFFICE VISIT (OUTPATIENT)
Dept: OBGYN | Facility: OTHER | Age: 35
End: 2020-03-31
Attending: OBSTETRICS & GYNECOLOGY
Payer: COMMERCIAL

## 2020-03-31 VITALS
SYSTOLIC BLOOD PRESSURE: 108 MMHG | WEIGHT: 129.4 LBS | HEART RATE: 86 BPM | DIASTOLIC BLOOD PRESSURE: 68 MMHG | BODY MASS INDEX: 25.27 KG/M2

## 2020-03-31 DIAGNOSIS — O09.891 HX OF PRETERM DELIVERY, CURRENTLY PREGNANT, FIRST TRIMESTER: Primary | ICD-10-CM

## 2020-03-31 DIAGNOSIS — Z72.0 TOBACCO ABUSE: ICD-10-CM

## 2020-03-31 PROCEDURE — 99207 ZZC OB VISIT-NO CHARGE - GICH ONLY: CPT | Performed by: OBSTETRICS & GYNECOLOGY

## 2020-03-31 RX ORDER — VARENICLINE TARTRATE 1 MG/1
1 TABLET, FILM COATED ORAL 2 TIMES DAILY
Qty: 60 TABLET | Refills: 1 | Status: SHIPPED | OUTPATIENT
Start: 2020-03-31 | End: 2020-05-15

## 2020-03-31 ASSESSMENT — PAIN SCALES - GENERAL: PAINLEVEL: NO PAIN (0)

## 2020-03-31 NOTE — PROGRESS NOTES
Return OB Visit    S: Patient is feeling better. Nausea improved. No cramping or VB. Anxiety has been better with hydroxyzine. Still working on smoking cessation    O: /68 (BP Location: Right arm)   Pulse 86   Wt 58.7 kg (129 lb 6.4 oz)   LMP 2019 (Exact Date)   BMI 25.27 kg/m    Gen: Well-appearing, NAD  See OB Flowsheet    A/P:  Sofya Brown is a 34 year old  at 13w2d by LMP c/w 9w2d US, here for return OB visit.  Tobacco use: chantix  Depression, anxiety: hydroxyzine prn  Hx of PPROM at 35w6d: Man at 16 weeks  Nausea, vomiting: improved    PNC: Rh positive, Rubella immune  Genetics: declines  Imaging: dating US at 9w2d  Immunizations: s/p flu  RTC 6 weeks    Claudia Morfin MD  OB/GYN  3/31/2020 3:38 PM

## 2020-04-24 ENCOUNTER — THERAPY VISIT (OUTPATIENT)
Dept: CHIROPRACTIC MEDICINE | Facility: OTHER | Age: 35
End: 2020-04-24
Attending: CHIROPRACTOR
Payer: COMMERCIAL

## 2020-04-24 DIAGNOSIS — G43.709 CHRONIC MIGRAINE WITHOUT AURA WITHOUT STATUS MIGRAINOSUS, NOT INTRACTABLE: Primary | ICD-10-CM

## 2020-04-24 DIAGNOSIS — M99.01 SEGMENTAL AND SOMATIC DYSFUNCTION OF CERVICAL REGION: ICD-10-CM

## 2020-04-24 DIAGNOSIS — M99.04 SEGMENTAL AND SOMATIC DYSFUNCTION OF SACRAL REGION: ICD-10-CM

## 2020-04-24 DIAGNOSIS — M54.2 CERVICALGIA: ICD-10-CM

## 2020-04-24 PROCEDURE — 98940 CHIROPRACT MANJ 1-2 REGIONS: CPT | Mod: AT | Performed by: CHIROPRACTOR

## 2020-04-24 NOTE — PROGRESS NOTES
"Visit #:  4    Subjective:  Sofya Brown is a 34 year old female who is seen in f/u up for:        Chronic migraine without aura without status migrainosus, not intractable  Cervicalgia  Segmental and somatic dysfunction of cervical region  Segmental and somatic dysfunction of sacral region.     Since last visit on 3/19/2020,  Sofya Brown reports: Noticing an increase of neck pain and headache symptoms.  Patient has been attempting to manage symptoms on her own with home stretches.  While this does provide some relief patient continues to have problems.  As symptoms do not seem to be improving patient did contact her office for follow-up evaluation and treatment    Area of chief complaint:  Cervical :  Symptoms are graded at 5-8/10. The quality is described as achey, tight.    Lower back is not painful but feel's \"stuck.\"     Objective:  The following was observed:  Neck Disability Index (  Linus ALEX. and Raudel WONG. 1991. All rights reserved.; used with permission) 4/24/2020   SECTION 1 - PAIN INTENSITY 1   SECTION 2 - PERSONAL CARE 0   SECTION 3 - LIFTING 0   SECTION 4 - READING 0   SECTION 5 - HEADACHES 4   SECTION 6 - CONCENTRATION 2   SECTION 7 - WORK 1   SECTION 8 - DRIVING 0   SECTION 9 - SLEEPING 4   SECTION 10 - RECREATION 0   Count 10   Sum 12   Raw Score: /50 12   Neck Disability Index Score: (%) 24     Oswestry ([place]) Questionnaire    OSWESTRY DISABILITY INDEX 4/24/2020   Count 9   Sum 7   Oswestry Score (%) 15.56   Some recent data might be hidden      Both scores have increased, standing and headaches are increased    P: palpatory tenderness Does not along the suboccipital ridge, upper trapezius bilaterally, left PSIS:    A: static palpation demonstrates intersegmental asymmetry , cervical, pelvis  R: motion palpation notes restricted motion, C1 , C2  and Sacrum   T: muscle spasm at level(s): Sub-occipital and Traps Bilaterally    Segmental spinal dysfunction/restrictions found at:  :  C1 Right " rotation restricted and Left lateral flexion restricted  C2 Left rotation restricted and Extension restriction  Sacrum Left lateral flexion restricted and Extension restriction.      Assessment: Mild/moderate aggravation of ongoing neck and upper back pain/headache symptoms.  Patient has been responding favorably with chiropractic adjustments in the past and I do believe that to be the case currently.  Moderate spasms noted throughout patient's suboccipitals and upper trapezius.  We have tried to treat patient's muscular ailments with acupuncture.  Based off of prior treatment notes I do not believe this to have been effective in managing symptoms.  However this is also possible because patient was only seen 2 times before coronavirus outbreak.  Recommend continuation of care at once a week for up to 4 weeks at this time.    Diagnoses:      1. Chronic migraine without aura without status migrainosus, not intractable    2. Cervicalgia    3. Segmental and somatic dysfunction of cervical region    4. Segmental and somatic dysfunction of sacral region        Patient's condition:  Patient had restrictions pre-manipulation and Patient symptoms are worsened.    Treatment effectiveness:  Post manipulation there is better intersegmental movement and Patient claims to feel looser post manipulation      Procedures:  CMT:  44006 Chiropractic manipulative treatment 1-2 regions performed   Cervical: Diversified, C1 , C2, Supine  Pelvis: Diversified, Sacrum , Side posture    Modalities:  42598: MSTM:  To Sub-occipital and Traps  for 3 min    Therapeutic procedures:  None    Response to Treatment  Reduction in symptoms as reported by patient    Prognosis: Good    Progress towards Goals: Patient will report improved pain. In progress              Patient will report reduced frequency and intensity of headaches by 50%. In progress              Patient will report improved concentration. In progress              Patient will  demonstrate an improved ability to complete Activities of Daily Living   as shown by a reported 10-30% reduced score on neck and/or back index.  4/24/2020 have increased              Patient will demonstrate improved ROM. In progress    Recommendations:    Instructions:ice 20 minutes every other hour as needed, heat 15 minutes every other hour as needed, stretch as instructed at visit and Use this ball for self myofascial release as instructed    Follow-up:  Return to care in 1 week.

## 2020-04-24 NOTE — PATIENT INSTRUCTIONS
ice 20 minutes every other hour as needed, heat 15 minutes every other hour as needed, stretch as instructed at visit and Use this ball for self myofascial release as instructed

## 2020-04-28 ENCOUNTER — THERAPY VISIT (OUTPATIENT)
Dept: CHIROPRACTIC MEDICINE | Facility: OTHER | Age: 35
End: 2020-04-28
Attending: CHIROPRACTOR
Payer: COMMERCIAL

## 2020-04-28 DIAGNOSIS — M54.50 RIGHT-SIDED LOW BACK PAIN WITHOUT SCIATICA: ICD-10-CM

## 2020-04-28 DIAGNOSIS — M54.9 UPPER BACK PAIN, CHRONIC: ICD-10-CM

## 2020-04-28 DIAGNOSIS — M99.01 SEGMENTAL AND SOMATIC DYSFUNCTION OF CERVICAL REGION: ICD-10-CM

## 2020-04-28 DIAGNOSIS — G89.29 UPPER BACK PAIN, CHRONIC: ICD-10-CM

## 2020-04-28 DIAGNOSIS — M99.04 SEGMENTAL AND SOMATIC DYSFUNCTION OF SACRAL REGION: ICD-10-CM

## 2020-04-28 DIAGNOSIS — M54.2 CERVICALGIA: Primary | ICD-10-CM

## 2020-04-28 DIAGNOSIS — M99.02 SEGMENTAL AND SOMATIC DYSFUNCTION OF THORACIC REGION: ICD-10-CM

## 2020-04-28 PROCEDURE — 98941 CHIROPRACT MANJ 3-4 REGIONS: CPT | Mod: AT | Performed by: CHIROPRACTOR

## 2020-04-28 NOTE — PATIENT INSTRUCTIONS
Continue with home remedies as discussed on prior visits.  Incorporate belly breathing as discussed

## 2020-04-28 NOTE — PROGRESS NOTES
Visit #:  5    Subjective:  Sofya Brown is a 34 year old female who is seen in f/u up for:        Cervicalgia  Segmental and somatic dysfunction of cervical region  Upper back pain, chronic  Segmental and somatic dysfunction of thoracic region  Right-sided low back pain without sciatica  Segmental and somatic dysfunction of sacral region.     Since last visit on 4/24/2020,  Sofya Brown reports: Noticing improvement of her symptoms for approximately 2 days before they returned.  Patient reports attempting to manage symptoms on her own with home exercises as well as with myofascial release.  Home remedies provide little relief of symptoms as we are noticing at this time    Area of chief complaint:  Cervical and Thoracic :  Symptoms are graded at 5-6/10. The quality is described as achey, tight.    Lumbar :  Symptoms are graded at 3/10. The quality is described as achey/tight.      Objective:  The following was observed:    P: palpatory tenderness Suboccipital ridge bilaterally, right PSIS, right CT junction, T4 midline:    A: static palpation demonstrates intersegmental asymmetry , cervical, thoracic, pelvis  R: motion palpation notes restricted motion, C1 , C2 , T1 , T4  and Sacrum   T: muscle spasm at level(s): Upper trapezius and levator scapula bilaterally, suboccipitals bilaterally, taut and tender fibers noted of the paraspinal musculature cervical into upper thoracic bilaterally, right quadratus lumborum:      Segmental spinal dysfunction/restrictions found at:  :  C1 Left rotation restricted and Right lateral flexion restricted  C2 Left lateral flexion restricted and Extension restriction  T1 Right lateral flexion restricted and Extension restriction  T4 Extension restriction  Sacrum Right lateral flexion restricted and Extension restriction.      Assessment: Patient does appear to making progress albeit modestly.  Recommend continuation of twice week care for up to 2 weeks at this  time.    Diagnoses:      1. Cervicalgia    2. Segmental and somatic dysfunction of cervical region    3. Upper back pain, chronic    4. Segmental and somatic dysfunction of thoracic region    5. Right-sided low back pain without sciatica    6. Segmental and somatic dysfunction of sacral region        Patient's condition:  Patient had restrictions pre-manipulation, Patient symptoms are gradually improving and Symptoms come and go    Treatment effectiveness:  Post manipulation there is better intersegmental movement and Patient states that they feel much better post manipulation but they gradually tighten up over time      Procedures:  CMT:  06633 Chiropractic manipulative treatment 3-4 regions performed   Cervical: Diversified, C1 , C2, Supine  Thoracic: Diversified, T1, T4, Prone  Pelvis: Diversified, Sacrum , Side posture    Modalities:  None performed this visit    Therapeutic procedures:  Encouraged belly breathing for 10 to 12 breaths.  This done to promote restoration of patient's posture reducing strain placed on upper trapezius and suboccipital musculature    Response to Treatment  Reduction in symptoms as reported by patient    Prognosis: Good    Progress towards Goals:Patient will report improved pain. In progress              Patient will report reduced frequency and intensity of headaches by 50%. In progress              Patient will report improved concentration. In progress              Patient will demonstrate an improved ability to complete Activities of Daily Living   as shown by a reported 10-30% reduced score on neck and/or back index.  4/24/2020 have increased              Patient will demonstrate improved ROM. In progress       Recommendations:    Instructions:Continue with home remedies as discussed on prior visits.  Incorporate belly breathing as discussed    Follow-up:  Return to care in 3 days.

## 2020-05-01 ENCOUNTER — THERAPY VISIT (OUTPATIENT)
Dept: CHIROPRACTIC MEDICINE | Facility: OTHER | Age: 35
End: 2020-05-01
Attending: CHIROPRACTOR
Payer: COMMERCIAL

## 2020-05-01 DIAGNOSIS — M99.01 SEGMENTAL AND SOMATIC DYSFUNCTION OF CERVICAL REGION: ICD-10-CM

## 2020-05-01 DIAGNOSIS — M99.02 SEGMENTAL AND SOMATIC DYSFUNCTION OF THORACIC REGION: ICD-10-CM

## 2020-05-01 DIAGNOSIS — M54.9 UPPER BACK PAIN, CHRONIC: ICD-10-CM

## 2020-05-01 DIAGNOSIS — M54.2 CERVICALGIA: Primary | ICD-10-CM

## 2020-05-01 DIAGNOSIS — M99.04 SEGMENTAL AND SOMATIC DYSFUNCTION OF SACRAL REGION: ICD-10-CM

## 2020-05-01 DIAGNOSIS — M54.50 LUMBAGO: ICD-10-CM

## 2020-05-01 DIAGNOSIS — G89.29 UPPER BACK PAIN, CHRONIC: ICD-10-CM

## 2020-05-01 PROCEDURE — 98941 CHIROPRACT MANJ 3-4 REGIONS: CPT | Mod: AT | Performed by: CHIROPRACTOR

## 2020-05-01 NOTE — PROGRESS NOTES
Visit #:  6    Subjective:  Sofya Brown is a 34 year old female who is seen in f/u up for:        Cervicalgia  Segmental and somatic dysfunction of cervical region  Upper back pain, chronic  Segmental and somatic dysfunction of thoracic region  Lumbago  Segmental and somatic dysfunction of sacral region.     Since last visit on 4/28/2020,  Sofya Brown reports: Noticing some improvement of symptoms after last treatment.  Patient states that yesterday she performed home painting project which required her to be bent over for extended period of times. Patient Feels that this may have contributed to aggravation of mid and low back pain symptoms at this time    Area of chief complaint:  Cervical :  Symptoms are graded at 3-4/10. The quality is described as stiff, achey. Home exercises have been helping  Thoracic :  Symptoms are graded at 7/10. The quality is described as achey, tight constantly. Home exercises dont seem to be helping.  Lumbar :  Symptoms are graded at 4-6/10. The quality is described as achey, tight constantly. Home exercises dont seem to be helping      Objective:  The following was observed:    P: palpatory tenderness Present T6 and T8 midline, left PSIS, right side C1, left side C5:    A: static palpation demonstrates intersegmental asymmetry , cervical, thoracic, pelvis  R: motion palpation notes restricted motion, C1 , C5 , T6  and Sacrum   T: muscle spasm at level(s): Lumbar erector spine, Quad lumb and T-spine paraspinal L>>R    Segmental spinal dysfunction/restrictions found at:  :  C1 Left rotation restricted and Right lateral flexion restricted  C5 Right rotation restricted and Extension restriction  T6 Extension restriction  Sacrum Left lateral flexion restricted and Extension restriction.      Assessment: Neck appears to be showing improvement.  Mid and low back pain does appear to be moderately agitated.  On today's visit I did find quite a bit of muscular spasms of the thoracic  and lumbar spinal regions consistent with patient's pain as we are seeing.  Segmental/somatic dysfunction does appear to be improving.  We will follow-up with patient in 1 week to determine further course of care.    Diagnoses:      1. Cervicalgia    2. Segmental and somatic dysfunction of cervical region    3. Upper back pain, chronic    4. Segmental and somatic dysfunction of thoracic region    5. Lumbago    6. Segmental and somatic dysfunction of sacral region        Patient's condition:  Patient had restrictions pre-manipulation, Symptoms come and go    Treatment effectiveness:  Post manipulation there is better intersegmental movement and Patient claims to feel looser post manipulation      Procedures:  CMT:  50256 Chiropractic manipulative treatment 3-4 regions performed   Cervical: Diversified, C1 , C5 , Supine  Thoracic: Diversified, T6, Prone  Pelvis: Drop Table, Sacrum , Prone    Modalities:  None performed this visit    Therapeutic procedures:  None    Response to Treatment  Reduction in symptoms as reported by patient    Prognosis: Good    Progress towards Goals: Patient will report improved pain. In progress              Patient will report reduced frequency and intensity of headaches by 50%. In progress              Patient will report improved concentration. In progress              Patient will demonstrate an improved ability to complete Activities of Daily Living   as shown by a reported 10-30% reduced score on neck and/or back index.  4/24/2020 have increased              Patient will demonstrate improved ROM. In progress    Recommendations:    Instructions:Monitor symptoms and perform activities as tolerated    Follow-up:  Return to care in 1 week.

## 2020-05-08 ENCOUNTER — TELEPHONE (OUTPATIENT)
Dept: OBGYN | Facility: OTHER | Age: 35
End: 2020-05-08

## 2020-05-08 NOTE — TELEPHONE ENCOUNTER
Called patient to find out the reason she continues to cancel her [place] Injections.  She states that she is worried to come to clinic more than she has to.  She also stated that her  looked more into the [employer] and has decided they don't feel they want to get it.  I did let them know that we could set up for Home injections that her significant other could give to her.  She thanked me for the information and stated she would let us know if she changes her mind.  I did let her know they have until 20w6D to make this decision.   Susan Birmingham LPN........................5/8/2020  3:24 PM

## 2020-05-15 ENCOUNTER — PRENATAL OFFICE VISIT (OUTPATIENT)
Dept: OBGYN | Facility: OTHER | Age: 35
End: 2020-05-15
Attending: OBSTETRICS & GYNECOLOGY
Payer: COMMERCIAL

## 2020-05-15 ENCOUNTER — HOSPITAL ENCOUNTER (OUTPATIENT)
Dept: ULTRASOUND IMAGING | Facility: OTHER | Age: 35
End: 2020-05-15
Attending: OBSTETRICS & GYNECOLOGY
Payer: COMMERCIAL

## 2020-05-15 VITALS
DIASTOLIC BLOOD PRESSURE: 72 MMHG | HEART RATE: 88 BPM | WEIGHT: 136.9 LBS | BODY MASS INDEX: 26.74 KG/M2 | OXYGEN SATURATION: 97 % | RESPIRATION RATE: 18 BRPM | SYSTOLIC BLOOD PRESSURE: 110 MMHG

## 2020-05-15 DIAGNOSIS — O09.891 HX OF PRETERM DELIVERY, CURRENTLY PREGNANT, FIRST TRIMESTER: ICD-10-CM

## 2020-05-15 DIAGNOSIS — O99.332 MATERNAL TOBACCO USE IN SECOND TRIMESTER: Primary | ICD-10-CM

## 2020-05-15 PROCEDURE — 99207 ZZC OB VISIT-NO CHARGE - GICH ONLY: CPT | Performed by: OBSTETRICS & GYNECOLOGY

## 2020-05-15 PROCEDURE — 76805 OB US >/= 14 WKS SNGL FETUS: CPT

## 2020-05-15 ASSESSMENT — PAIN SCALES - GENERAL: PAINLEVEL: NO PAIN (0)

## 2020-05-15 NOTE — NURSING NOTE
Chief Complaint   Patient presents with     Prenatal Care     19 w 5 d       Initial /72 (BP Location: Right arm, Patient Position: Sitting, Cuff Size: Adult Regular)   Pulse 88   Resp 18   Wt 62.1 kg (136 lb 14.4 oz)   LMP 12/29/2019 (Exact Date)   SpO2 97%   Breastfeeding No   BMI 26.74 kg/m   Estimated body mass index is 26.74 kg/m  as calculated from the following:    Height as of 3/6/20: 1.524 m (5').    Weight as of this encounter: 62.1 kg (136 lb 14.4 oz).  Medication Reconciliation: complete    Tran Hardwick LPN

## 2020-05-15 NOTE — PROGRESS NOTES
Doing well  US today: report pending  Showing growth at 60%  Normal PAUL  Cx 3.4 cm in length  Posterior fundal placenta    Requesting nicotine patch - smoking 1/4 ppd - prescribed 7 mg

## 2020-05-18 ENCOUNTER — TELEPHONE (OUTPATIENT)
Dept: OBGYN | Facility: OTHER | Age: 35
End: 2020-05-18

## 2020-05-18 DIAGNOSIS — O09.92 SUPERVISION OF HIGH RISK PREGNANCY IN SECOND TRIMESTER: Primary | ICD-10-CM

## 2020-05-18 NOTE — TELEPHONE ENCOUNTER
Called patient with ultrasound results- will do a follow up and coordinate with next appt per patient request.  Claudia Morfin MD  OB/GYN  5/18/2020 9:55 AM

## 2020-06-16 ENCOUNTER — PRENATAL OFFICE VISIT (OUTPATIENT)
Dept: OBGYN | Facility: OTHER | Age: 35
End: 2020-06-16
Attending: OBSTETRICS & GYNECOLOGY
Payer: COMMERCIAL

## 2020-06-16 ENCOUNTER — HOSPITAL ENCOUNTER (OUTPATIENT)
Dept: ULTRASOUND IMAGING | Facility: OTHER | Age: 35
End: 2020-06-16
Attending: OBSTETRICS & GYNECOLOGY
Payer: COMMERCIAL

## 2020-06-16 VITALS
BODY MASS INDEX: 27.54 KG/M2 | HEART RATE: 97 BPM | SYSTOLIC BLOOD PRESSURE: 110 MMHG | DIASTOLIC BLOOD PRESSURE: 50 MMHG | WEIGHT: 141 LBS

## 2020-06-16 DIAGNOSIS — O09.92 SUPERVISION OF HIGH RISK PREGNANCY IN SECOND TRIMESTER: ICD-10-CM

## 2020-06-16 DIAGNOSIS — O09.92 SUPERVISION OF HIGH RISK PREGNANCY IN SECOND TRIMESTER: Primary | ICD-10-CM

## 2020-06-16 DIAGNOSIS — Z71.6 ENCOUNTER FOR SMOKING CESSATION COUNSELING: ICD-10-CM

## 2020-06-16 DIAGNOSIS — K21.9 GASTROESOPHAGEAL REFLUX DISEASE, ESOPHAGITIS PRESENCE NOT SPECIFIED: ICD-10-CM

## 2020-06-16 PROCEDURE — 99207 ZZC OB VISIT-NO CHARGE - GICH ONLY: CPT | Performed by: OBSTETRICS & GYNECOLOGY

## 2020-06-16 PROCEDURE — 76816 OB US FOLLOW-UP PER FETUS: CPT

## 2020-06-16 RX ORDER — POLYETHYLENE GLYCOL 3350 17 G
2 POWDER IN PACKET (EA) ORAL
Qty: 108 LOZENGE | Refills: 1 | Status: SHIPPED | OUTPATIENT
Start: 2020-06-16 | End: 2020-11-03

## 2020-06-16 RX ORDER — FAMOTIDINE 20 MG/1
20 TABLET, FILM COATED ORAL 2 TIMES DAILY
Qty: 90 TABLET | Refills: 1 | Status: SHIPPED | OUTPATIENT
Start: 2020-06-16 | End: 2020-11-03

## 2020-06-16 ASSESSMENT — PAIN SCALES - GENERAL: PAINLEVEL: NO PAIN (0)

## 2020-06-16 NOTE — PROGRESS NOTES
Return OB Visit    S: Patient is doing okay. Has increased GERD, TUMS not helping as much anymore. Some RLP. No ctx, VB or LOF. +FM    O: /50 (BP Location: Right arm, Patient Position: Sitting, Cuff Size: Adult Regular)   Pulse 97   Wt 64 kg (141 lb)   LMP 2019 (Exact Date)   BMI 27.54 kg/m    Gen: Well-appearing, NAD  See OB Flowsheet    A/P:  Sofya Brown is a 34 year old  at 24w2d by LMP c/w 9w2d US, here for return OB visit.  Tobacco use: tried chantix but was too nauseated, never started nicotine patch. Explained this is category D in pregnancy, recommend trying lozenges or gum instead- patient prefers to try lozenges.   Depression, anxiety: hydroxyzine prn  Hx of PPROM at 35w6d: declined [place]  Nausea, vomiting: improved  GERD: pepcid     PNC: Rh positive, Rubella immune  Genetics: declines  Imaging: dating US at 9w2d, anatomy normal except possible echogenicity around left choroid plexus cyst, follow up pending from today  Immunizations: s/p flu  RTC 4 weeks- CBC, GCT, syphilis, Tdap next visit    Claudia Morfin MD  OB/GYN  2020 1:28 PM

## 2020-06-16 NOTE — NURSING NOTE
Patient here for prenatal care. States she does not have questions. Pt states she has been having constant heart burn, states this has been happening over the last month.     Medication Reconciliation: anthony Barraza LPN  6/16/2020 1:22 PM

## 2020-06-24 ENCOUNTER — NURSE TRIAGE (OUTPATIENT)
Dept: FAMILY MEDICINE | Facility: OTHER | Age: 35
End: 2020-06-24

## 2020-06-24 NOTE — TELEPHONE ENCOUNTER
"Patient was contacted and states she is having clear yellow discharge with a \"thin mucus\" consistency. No odor. She is starting to feel that her vulva is getting raw from the drainage. She also notes some pressure when using toilet, thinks it may be bladder related, but has no other bladder symptoms.  Patient then admits that baby has been moving less the past several days. She notes that baby is still moving, but the frequency and quality have changed. Patient is willing and able to do a kick count. Discussion was had with Dr. Claudia Morfin MD and if patient does not pass she will need to go to [facility].    Will reconnect with patient after she has had a chance to complete kick count.    Ruth Valera RN...................6/24/2020 1:43 PM    "

## 2020-06-24 NOTE — TELEPHONE ENCOUNTER
Patient is having some unusual discharge. Please call.     Aislinn Beard on 6/24/2020 at 11:29 AM

## 2020-06-24 NOTE — TELEPHONE ENCOUNTER
"Patient was contacted for follow up and states she did have 5 movements. She states the movements are just \"not as wild\". She was offered to go to [facility] for monitoring or be seen in clinic tomorrow. Patient prefers to be seen in clinic and was given an appointment. She will go to [facility] if anything changes between now and then.    Ruth Valera RN...................6/24/2020 2:38 PM    "

## 2020-06-25 ENCOUNTER — PRENATAL OFFICE VISIT (OUTPATIENT)
Dept: OBGYN | Facility: OTHER | Age: 35
End: 2020-06-25
Attending: OBSTETRICS & GYNECOLOGY
Payer: COMMERCIAL

## 2020-06-25 VITALS
OXYGEN SATURATION: 97 % | DIASTOLIC BLOOD PRESSURE: 66 MMHG | BODY MASS INDEX: 28.3 KG/M2 | WEIGHT: 144.9 LBS | SYSTOLIC BLOOD PRESSURE: 116 MMHG | RESPIRATION RATE: 16 BRPM | HEART RATE: 94 BPM

## 2020-06-25 DIAGNOSIS — Z3A.25 25 WEEKS GESTATION OF PREGNANCY: Primary | ICD-10-CM

## 2020-06-25 DIAGNOSIS — N89.8 VAGINAL DISCHARGE: ICD-10-CM

## 2020-06-25 DIAGNOSIS — O09.92 SUPERVISION OF HIGH RISK PREGNANCY IN SECOND TRIMESTER: ICD-10-CM

## 2020-06-25 LAB
A1 MICROGLOB PLACENTAL VAG QL: NEGATIVE
SPECIMEN SOURCE: NORMAL
WET PREP SPEC: NORMAL

## 2020-06-25 PROCEDURE — 87210 SMEAR WET MOUNT SALINE/INK: CPT | Mod: ZL | Performed by: OBSTETRICS & GYNECOLOGY

## 2020-06-25 PROCEDURE — 99207 ZZC OB VISIT-NO CHARGE - GICH ONLY: CPT | Performed by: OBSTETRICS & GYNECOLOGY

## 2020-06-25 PROCEDURE — 84112 EVAL AMNIOTIC FLUID PROTEIN: CPT | Mod: ZL | Performed by: OBSTETRICS & GYNECOLOGY

## 2020-06-25 PROCEDURE — 76805 OB US >/= 14 WKS SNGL FETUS: CPT | Mod: 26 | Performed by: OBSTETRICS & GYNECOLOGY

## 2020-06-25 ASSESSMENT — PAIN SCALES - GENERAL: PAINLEVEL: NO PAIN (0)

## 2020-06-25 NOTE — NURSING NOTE
Chief Complaint   Patient presents with     Prenatal Care     25 w 4 d       Initial /66 (BP Location: Right arm, Patient Position: Sitting, Cuff Size: Adult Regular)   Pulse 94   Resp 16   Wt 65.7 kg (144 lb 14.4 oz)   LMP 12/29/2019 (Exact Date)   SpO2 97%   Breastfeeding No   BMI 28.30 kg/m   Estimated body mass index is 28.3 kg/m  as calculated from the following:    Height as of 3/6/20: 1.524 m (5').    Weight as of this encounter: 65.7 kg (144 lb 14.4 oz).  Medication Reconciliation: complete    Tran Hardwick LPN

## 2020-06-25 NOTE — PROGRESS NOTES
Less movement x one week - still meeting kick counts  Some discharge - thin  Check T&Y and amni-sure - both negative  US per DAB showing good movement, growth and normal fluid

## 2020-07-06 ENCOUNTER — HOSPITAL ENCOUNTER (OUTPATIENT)
Facility: OTHER | Age: 35
Discharge: HOME | End: 2020-07-06
Attending: FAMILY MEDICINE | Admitting: FAMILY MEDICINE
Payer: COMMERCIAL

## 2020-07-06 ENCOUNTER — HOSPITAL ENCOUNTER (OUTPATIENT)
Age: 35
End: 2020-07-06
Admitting: FAMILY MEDICINE
Payer: COMMERCIAL

## 2020-07-06 ENCOUNTER — APPOINTMENT (OUTPATIENT)
Dept: ULTRASOUND IMAGING | Facility: OTHER | Age: 35
End: 2020-07-06
Attending: FAMILY MEDICINE
Payer: COMMERCIAL

## 2020-07-06 VITALS
RESPIRATION RATE: 20 BRPM | BODY MASS INDEX: 27.88 KG/M2 | SYSTOLIC BLOOD PRESSURE: 118 MMHG | HEART RATE: 100 BPM | DIASTOLIC BLOOD PRESSURE: 61 MMHG | HEIGHT: 60 IN | TEMPERATURE: 98.8 F | OXYGEN SATURATION: 100 % | WEIGHT: 142 LBS

## 2020-07-06 PROBLEM — Z36.89 ENCOUNTER FOR TRIAGE IN PREGNANT PATIENT: Status: ACTIVE | Noted: 2020-07-06

## 2020-07-06 LAB
ALBUMIN SERPL-MCNC: 3.2 G/DL (ref 3.5–5.7)
ALBUMIN UR-MCNC: NEGATIVE MG/DL
ALP SERPL-CCNC: 57 U/L (ref 34–104)
ALT SERPL W P-5'-P-CCNC: 8 U/L (ref 7–52)
ANION GAP SERPL CALCULATED.3IONS-SCNC: 9 MMOL/L (ref 3–14)
APPEARANCE UR: CLEAR
AST SERPL W P-5'-P-CCNC: 11 U/L (ref 13–39)
BILIRUB SERPL-MCNC: 0.2 MG/DL (ref 0.3–1)
BILIRUB UR QL STRIP: NEGATIVE
BUN SERPL-MCNC: 5 MG/DL (ref 7–25)
CALCIUM SERPL-MCNC: 9 MG/DL (ref 8.6–10.3)
CHLORIDE SERPL-SCNC: 107 MMOL/L (ref 98–107)
CO2 SERPL-SCNC: 22 MMOL/L (ref 21–31)
COLOR UR AUTO: YELLOW
CREAT SERPL-MCNC: 0.48 MG/DL (ref 0.6–1.2)
ERYTHROCYTE [DISTWIDTH] IN BLOOD BY AUTOMATED COUNT: 12.8 % (ref 10–15)
GFR SERPL CREATININE-BSD FRML MDRD: >90 ML/MIN/{1.73_M2}
GLUCOSE SERPL-MCNC: 82 MG/DL (ref 70–105)
GLUCOSE UR STRIP-MCNC: NEGATIVE MG/DL
HCT VFR BLD AUTO: 32.4 % (ref 35–47)
HGB BLD-MCNC: 10.7 G/DL (ref 11.7–15.7)
HGB UR QL STRIP: NEGATIVE
KETONES UR STRIP-MCNC: NEGATIVE MG/DL
LEUKOCYTE ESTERASE UR QL STRIP: NEGATIVE
MCH RBC QN AUTO: 30 PG (ref 26.5–33)
MCHC RBC AUTO-ENTMCNC: 33 G/DL (ref 31.5–36.5)
MCV RBC AUTO: 91 FL (ref 78–100)
NITRATE UR QL: NEGATIVE
PH UR STRIP: 6.5 PH (ref 5–7)
PLATELET # BLD AUTO: 203 10E9/L (ref 150–450)
POTASSIUM SERPL-SCNC: 3.4 MMOL/L (ref 3.5–5.1)
PROT SERPL-MCNC: 5.9 G/DL (ref 6.4–8.9)
RBC # BLD AUTO: 3.57 10E12/L (ref 3.8–5.2)
RBC #/AREA URNS AUTO: <1 /HPF (ref 0–2)
SODIUM SERPL-SCNC: 138 MMOL/L (ref 134–144)
SOURCE: NORMAL
SP GR UR STRIP: 1 (ref 1–1.03)
UROBILINOGEN UR STRIP-MCNC: NORMAL MG/DL (ref 0–2)
WBC # BLD AUTO: 10.8 10E9/L (ref 4–11)
WBC #/AREA URNS AUTO: <1 /HPF (ref 0–5)

## 2020-07-06 PROCEDURE — 36415 COLL VENOUS BLD VENIPUNCTURE: CPT | Performed by: FAMILY MEDICINE

## 2020-07-06 PROCEDURE — 80053 COMPREHEN METABOLIC PANEL: CPT | Performed by: FAMILY MEDICINE

## 2020-07-06 PROCEDURE — 96374 THER/PROPH/DIAG INJ IV PUSH: CPT

## 2020-07-06 PROCEDURE — 25800030 ZZH RX IP 258 OP 636: Performed by: FAMILY MEDICINE

## 2020-07-06 PROCEDURE — 96375 TX/PRO/DX INJ NEW DRUG ADDON: CPT

## 2020-07-06 PROCEDURE — G0463 HOSPITAL OUTPT CLINIC VISIT: HCPCS

## 2020-07-06 PROCEDURE — 76819 FETAL BIOPHYS PROFIL W/O NST: CPT

## 2020-07-06 PROCEDURE — 81001 URINALYSIS AUTO W/SCOPE: CPT | Performed by: FAMILY MEDICINE

## 2020-07-06 PROCEDURE — 25000132 ZZH RX MED GY IP 250 OP 250 PS 637: Performed by: FAMILY MEDICINE

## 2020-07-06 PROCEDURE — 85027 COMPLETE CBC AUTOMATED: CPT | Performed by: FAMILY MEDICINE

## 2020-07-06 PROCEDURE — 25000125 ZZHC RX 250: Performed by: FAMILY MEDICINE

## 2020-07-06 PROCEDURE — G0463 HOSPITAL OUTPT CLINIC VISIT: HCPCS | Mod: 25

## 2020-07-06 PROCEDURE — 25000128 H RX IP 250 OP 636: Performed by: FAMILY MEDICINE

## 2020-07-06 RX ORDER — METOCLOPRAMIDE HYDROCHLORIDE 5 MG/ML
10 INJECTION INTRAMUSCULAR; INTRAVENOUS ONCE
Status: COMPLETED | OUTPATIENT
Start: 2020-07-06 | End: 2020-07-06

## 2020-07-06 RX ORDER — CALCIUM CARBONATE 500 MG/1
500 TABLET, CHEWABLE ORAL DAILY PRN
Status: DISCONTINUED | OUTPATIENT
Start: 2020-07-06 | End: 2020-07-06 | Stop reason: HOSPADM

## 2020-07-06 RX ORDER — DIPHENHYDRAMINE HCL 50 MG
50 CAPSULE ORAL ONCE
Status: COMPLETED | OUTPATIENT
Start: 2020-07-06 | End: 2020-07-06

## 2020-07-06 RX ORDER — ONDANSETRON 2 MG/ML
4 INJECTION INTRAMUSCULAR; INTRAVENOUS ONCE
Status: COMPLETED | OUTPATIENT
Start: 2020-07-06 | End: 2020-07-06

## 2020-07-06 RX ADMIN — SODIUM CHLORIDE, POTASSIUM CHLORIDE, SODIUM LACTATE AND CALCIUM CHLORIDE 1000 ML: 600; 310; 30; 20 INJECTION, SOLUTION INTRAVENOUS at 18:10

## 2020-07-06 RX ADMIN — SODIUM CHLORIDE, POTASSIUM CHLORIDE, SODIUM LACTATE AND CALCIUM CHLORIDE 1000 ML: 600; 310; 30; 20 INJECTION, SOLUTION INTRAVENOUS at 17:03

## 2020-07-06 RX ADMIN — DIPHENHYDRAMINE HYDROCHLORIDE 50 MG: 50 CAPSULE ORAL at 18:44

## 2020-07-06 RX ADMIN — ONDANSETRON 4 MG: 2 INJECTION INTRAMUSCULAR; INTRAVENOUS at 17:02

## 2020-07-06 RX ADMIN — METOCLOPRAMIDE 10 MG: 5 INJECTION, SOLUTION INTRAMUSCULAR; INTRAVENOUS at 18:44

## 2020-07-06 RX ADMIN — LIDOCAINE HYDROCHLORIDE: 20 SOLUTION ORAL; TOPICAL at 18:46

## 2020-07-06 RX ADMIN — CALCIUM CARBONATE 500 MG: 500 TABLET ORAL at 17:18

## 2020-07-06 ASSESSMENT — MIFFLIN-ST. JEOR: SCORE: 1275.61

## 2020-07-06 NOTE — PROGRESS NOTES
Pt has been having nausea and vomitting since Saturday, diarrhea noted yesterday. To BR and voids. EFM on. H and P obtained.

## 2020-07-06 NOTE — H&P
"[facility]    History and Physical  Obstetrics and Gynecology     Date of Admission:  2020    Assessment & Plan   Sofya Brown is a 34 year old female who presents with 2 days of illness.     ASSESSMENT:   IUP @ 27w1d  Acid reflux  Nausea/emesis/diarrhea  General malaise  migraine    PLAN:   Labs  BPP  Reglan/benedryl  IV hydration  GICocktail    Chanel Herrera    History of Present Illness   Sofya Brown is a 34 year old female  27w1d  Estimated Date of Delivery: 10/4/20 is calculated from Patient's last menstrual period was 2019 (exact date). is evaluated on [facility].    Patient notes worsening acid reflux over the weekend, almost like her esophageal sphincter was \"wide open\" which progressed to emesis when she would lay flat. Last night she started to have diarrhea and also has a headache and more facial pressure. She just doesn't feel great. Feels tired, the baby seems to be moving less and she thinks her uterus is much smaller then it was a month ago. Hx of PPROM at 35w6d.     No fevers, no known sick contacts.     PRENATAL COURSE  Prenatal course was essentially uncomplicated      Recent Labs   Lab Test 20  1610   ABO A   RH Pos   AS Neg     Rhogam not indicated   Recent Labs   Lab Test 20  1610   HEPBANG Nonreactive   HIAGAB Nonreactive   RUQIGG 19         Prior to Admission Medications   Prior to Admission Medications   Prescriptions Last Dose Informant Patient Reported? Taking?   ALPRAZolam (XANAX) 0.25 MG tablet More than a month at Unknown time  No No   Sig: Take 1 tablet (0.25 mg) by mouth 2 times daily as needed for anxiety   Magnesium Oxide 500 MG TABS Past Week at Unknown time  Yes Yes   Sig: Take 1 tablet by mouth daily   Prenatal Vit-Fe Fumarate-FA (PRENATAL MULTIVITAMIN W/IRON) 27-0.8 MG tablet Past Week at Unknown time  Yes Yes   Sig: Take 1 tablet by mouth daily   cholecalciferol (VITAMIN D3) 5000 units (125 mcg) capsule Past Week at Unknown " time  Yes Yes   Sig: Take 5,000 Units by mouth daily   famotidine (PEPCID) 20 MG tablet 7/6/2020 at Unknown time  No Yes   Sig: Take 1 tablet (20 mg) by mouth 2 times daily   hydrOXYzine (ATARAX) 25 MG tablet More than a month at Unknown time  No No   Sig: Take 1 tablet (25 mg) by mouth every 6 hours as needed for anxiety   nicotine (COMMIT) 2 MG lozenge More than a month at Unknown time  No No   Sig: Place 1 lozenge (2 mg) inside cheek every hour as needed for smoking cessation   nicotine (NICODERM CQ) 7 MG/24HR 24 hr patch More than a month at Unknown time  No No   Sig: Place 1 patch onto the skin every 24 hours   ondansetron (ZOFRAN) 4 MG tablet More than a month at Unknown time  No No   Sig: Take 1 tablet (4 mg) by mouth every 8 hours as needed for nausea      Facility-Administered Medications: None     Allergies   Allergies   Allergen Reactions     Pertussis Vaccines      Other reaction(s): Seizures     Sertraline      Other reaction(s): Dizziness         Physical Exam   Temp: 98.8  F (37.1  C) Temp src: Tympanic BP: 118/61 Pulse: 100   Resp: 20 SpO2: 100 % O2 Device: None (Room air)    Vital Signs with Ranges  Temp:  [98.8  F (37.1  C)] 98.8  F (37.1  C)  Pulse:  [100] 100  Resp:  [20] 20  BP: (118-135)/(61-73) 118/61  SpO2:  [100 %] 100 %    Abdomen: gravid, single vertex fetus, non-tender,   Cervical Exam: not checked  Fetal Heart Tones: 140 baseline, moderate variablility, + accels, no decels and Category I  TOCO:   No contractions until bladder full, then a few but resolved.     Constitutional: healthy, alert and active   Respiratory: No increased work of breathing  Skin/Extremites: no bruising or bleeding, normal skin color, texture, turgor and no redness, warmth, or swelling  Neurologic: Awake, alert, oriented to name, place and time.  Cranial nerves II-XII are grossly intact.  Neuropsychiatric: General: normal, calm and normal eye contact

## 2020-07-06 NOTE — ED TRIAGE NOTES
"Saturday night started vomiting, continued to not feel well, unable to eat or drink much.  Pt states that her abdomen was feeling firmer yesterday, today she noticed that it was really \"squishy\".  Drastic decrease in fetal movement.   "

## 2020-07-07 NOTE — PROGRESS NOTES
"Labs resulted and fluids finished, patient continues to feel just \"wiped out\". MD updated, ok to discharge to home with instructions to come back or follow up in clinic if not feeling better soon. Patient verbalized understanding, IV removed, AVS signed and given to patient.   "

## 2020-07-14 ENCOUNTER — PRENATAL OFFICE VISIT (OUTPATIENT)
Dept: OBGYN | Facility: OTHER | Age: 35
End: 2020-07-14
Attending: OBSTETRICS & GYNECOLOGY
Payer: COMMERCIAL

## 2020-07-14 VITALS
DIASTOLIC BLOOD PRESSURE: 56 MMHG | HEART RATE: 92 BPM | SYSTOLIC BLOOD PRESSURE: 112 MMHG | BODY MASS INDEX: 29.1 KG/M2 | WEIGHT: 149 LBS

## 2020-07-14 DIAGNOSIS — J01.00 ACUTE NON-RECURRENT MAXILLARY SINUSITIS: ICD-10-CM

## 2020-07-14 DIAGNOSIS — O09.93 SUPERVISION OF HIGH RISK PREGNANCY IN THIRD TRIMESTER: Primary | ICD-10-CM

## 2020-07-14 DIAGNOSIS — R73.09 ABNORMAL GLUCOSE TOLERANCE TEST: Primary | ICD-10-CM

## 2020-07-14 LAB
BASOPHILS # BLD AUTO: 0 10E9/L (ref 0–0.2)
BASOPHILS NFR BLD AUTO: 0.3 %
DIFFERENTIAL METHOD BLD: ABNORMAL
EOSINOPHIL # BLD AUTO: 0.1 10E9/L (ref 0–0.7)
EOSINOPHIL NFR BLD AUTO: 0.9 %
ERYTHROCYTE [DISTWIDTH] IN BLOOD BY AUTOMATED COUNT: 12.9 % (ref 10–15)
GLUCOSE 1H P 50 G GLC PO SERPL-MCNC: 168 MG/DL (ref 60–129)
HCT VFR BLD AUTO: 31.6 % (ref 35–47)
HGB BLD-MCNC: 10.4 G/DL (ref 11.7–15.7)
IMM GRANULOCYTES # BLD: 0.1 10E9/L (ref 0–0.4)
IMM GRANULOCYTES NFR BLD: 1.3 %
LYMPHOCYTES # BLD AUTO: 0.9 10E9/L (ref 0.8–5.3)
LYMPHOCYTES NFR BLD AUTO: 8.5 %
MCH RBC QN AUTO: 29.6 PG (ref 26.5–33)
MCHC RBC AUTO-ENTMCNC: 32.9 G/DL (ref 31.5–36.5)
MCV RBC AUTO: 90 FL (ref 78–100)
MONOCYTES # BLD AUTO: 0.8 10E9/L (ref 0–1.3)
MONOCYTES NFR BLD AUTO: 7.2 %
NEUTROPHILS # BLD AUTO: 8.9 10E9/L (ref 1.6–8.3)
NEUTROPHILS NFR BLD AUTO: 81.8 %
PLATELET # BLD AUTO: 212 10E9/L (ref 150–450)
RBC # BLD AUTO: 3.51 10E12/L (ref 3.8–5.2)
WBC # BLD AUTO: 10.9 10E9/L (ref 4–11)

## 2020-07-14 PROCEDURE — 36415 COLL VENOUS BLD VENIPUNCTURE: CPT | Mod: ZL | Performed by: OBSTETRICS & GYNECOLOGY

## 2020-07-14 PROCEDURE — 85025 COMPLETE CBC W/AUTO DIFF WBC: CPT | Mod: ZL | Performed by: OBSTETRICS & GYNECOLOGY

## 2020-07-14 PROCEDURE — 82950 GLUCOSE TEST: CPT | Mod: ZL | Performed by: OBSTETRICS & GYNECOLOGY

## 2020-07-14 PROCEDURE — 86780 TREPONEMA PALLIDUM: CPT | Mod: ZL | Performed by: OBSTETRICS & GYNECOLOGY

## 2020-07-14 PROCEDURE — 99207 ZZC OB VISIT-NO CHARGE - GICH ONLY: CPT | Performed by: OBSTETRICS & GYNECOLOGY

## 2020-07-14 ASSESSMENT — PAIN SCALES - GENERAL: PAINLEVEL: EXTREME PAIN (8)

## 2020-07-14 NOTE — PROGRESS NOTES
Return OB Visit    S: Patient reports she is feeling well overall. She was evaluated on [facility] on  for dehydration, nausea, diarrhea, and noted to have sinus congestion. Most of her symptoms have resolved but she continues to have congestion and now also has right sided facial pressure that radiates into her jaw and ear that started yesterday. Some BH ctx, no VB or LOF. +FM    O: /56 (BP Location: Right arm, Patient Position: Sitting, Cuff Size: Adult Large)   Pulse 92   Temp (P) 98.5  F (36.9  C) (Tympanic)   Wt 67.6 kg (149 lb)   LMP 2019 (Exact Date)   Breastfeeding No   BMI 29.10 kg/m    Gen: Well-appearing, NAD  See OB Flowsheet    A/P:  Sofya Brown is a 35 year old  at 28w2d by LMP c/w 9w2d US, here for return OB visit.  Tobacco use: tried chantix but was too nauseated, never started nicotine patch. Explained this is category D in pregnancy, recommend trying lozenges or gum instead- patient prefers to try lozenges.   Depression, anxiety: hydroxyzine prn  Hx of PPROM at 35w6d: declined [place]  Nausea, vomiting: improved  GERD: pepcid  Acute sinusitis: will do a course of augmentin (Category B)  Plans breastfeeding, epidural, Mirena     PNC: Rh positive, Rubella immune  Genetics: declines  Imaging: dating US at 9w2d, anatomy normal after follow up  Immunizations: s/p flu, will plan for Tdap vaccine while in the hospital due to a childhood reaction  RTC 4 weeks    Claudia Morfin MD  OB/GYN  2020 1:55 PM

## 2020-07-14 NOTE — NURSING NOTE
Chief Complaint   Patient presents with     Prenatal Care     28w2d     Not feeling well has Sinus Congestion and R) side of face is very plugged.   Susan Birmingham LPN........................7/14/2020  2:10 PM     Medication Reconciliation: completed   Susan Birmingham LPN  7/14/2020 2:09 PM

## 2020-07-15 ENCOUNTER — TELEPHONE (OUTPATIENT)
Dept: OBGYN | Facility: OTHER | Age: 35
End: 2020-07-15

## 2020-07-15 NOTE — TELEPHONE ENCOUNTER
Notified of results and transferred to scheduling.    Ruth Valera RN...................7/15/2020 3:12 PM

## 2020-07-16 LAB — T PALLIDUM AB SER QL: NONREACTIVE

## 2020-07-17 DIAGNOSIS — R73.09 ABNORMAL GLUCOSE TOLERANCE TEST: ICD-10-CM

## 2020-07-17 LAB
GLUCOSE 1H P 100 G GLC PO SERPL-MCNC: 176 MG/DL (ref 60–179)
GLUCOSE 2H P 100 G GLC PO SERPL-MCNC: 132 MG/DL (ref 60–154)
GLUCOSE 3H P 100 G GLC PO SERPL-MCNC: 121 MG/DL (ref 60–139)
GLUCOSE P FAST SERPL-MCNC: 84 MG/DL (ref 60–94)

## 2020-07-17 PROCEDURE — 36415 COLL VENOUS BLD VENIPUNCTURE: CPT | Mod: ZL | Performed by: OBSTETRICS & GYNECOLOGY

## 2020-07-17 PROCEDURE — 82952 GTT-ADDED SAMPLES: CPT | Mod: ZL | Performed by: OBSTETRICS & GYNECOLOGY

## 2020-07-17 PROCEDURE — 82951 GLUCOSE TOLERANCE TEST (GTT): CPT | Mod: ZL | Performed by: OBSTETRICS & GYNECOLOGY

## 2020-07-23 ENCOUNTER — NURSE TRIAGE (OUTPATIENT)
Dept: OBGYN | Facility: OTHER | Age: 35
End: 2020-07-23

## 2020-07-23 DIAGNOSIS — J01.00 ACUTE NON-RECURRENT MAXILLARY SINUSITIS: Primary | ICD-10-CM

## 2020-07-23 NOTE — TELEPHONE ENCOUNTER
S-(situation): Patient requesting medication for sinus infection.    B-(background): GA: 29w4d, [facility] Appointment. Prenatal visit 7/14/20: Pt c/o sinus congestion, and right sided facial pressure radiating into jaw and ear. 7 day course of Augmentin prescribed.     A-(assessment): Pt finished abx yesterday and provided some relief. Pain returned today- same as before, but now extending from nasal area up to temple and down to ear and into roof of mouth, on the right side. Pain reduced from 8-9/10 to 5/10 with Tylenol. Pt requesting alternative or continued prescription for abx, to be sent to [facility]. Denies: fever, chills, major swelling or redness of face      R-(recommendations): Routing to [facility] to review and for ordering consideration, when she returns tomorrow. Pt requesting call back at [phone]; ok to leave detailed message. Millie Thompson RN .............. 7/23/2020  5:45 PM

## 2020-07-24 NOTE — TELEPHONE ENCOUNTER
Called patient regarding her sinus infection concerns, recommend she be seen in primary care clinic for further evaluation since she failed initial management  Claudia Morfin MD  OB/GYN  7/24/2020 3:39 PM

## 2020-07-28 ENCOUNTER — PRENATAL OFFICE VISIT (OUTPATIENT)
Dept: OBGYN | Facility: OTHER | Age: 35
End: 2020-07-28
Attending: OBSTETRICS & GYNECOLOGY
Payer: COMMERCIAL

## 2020-07-28 VITALS
WEIGHT: 152.7 LBS | HEART RATE: 96 BPM | SYSTOLIC BLOOD PRESSURE: 120 MMHG | DIASTOLIC BLOOD PRESSURE: 64 MMHG | OXYGEN SATURATION: 97 % | BODY MASS INDEX: 29.82 KG/M2

## 2020-07-28 DIAGNOSIS — O09.93 SUPERVISION OF HIGH RISK PREGNANCY IN THIRD TRIMESTER: Primary | ICD-10-CM

## 2020-07-28 PROCEDURE — 99207 ZZC OB VISIT-NO CHARGE - GICH ONLY: CPT | Performed by: OBSTETRICS & GYNECOLOGY

## 2020-07-28 ASSESSMENT — PAIN SCALES - GENERAL: PAINLEVEL: NO PAIN (0)

## 2020-08-11 ENCOUNTER — PRENATAL OFFICE VISIT (OUTPATIENT)
Dept: OBGYN | Facility: OTHER | Age: 35
End: 2020-08-11
Attending: OBSTETRICS & GYNECOLOGY
Payer: COMMERCIAL

## 2020-08-11 VITALS
HEART RATE: 100 BPM | WEIGHT: 154.5 LBS | SYSTOLIC BLOOD PRESSURE: 130 MMHG | BODY MASS INDEX: 30.17 KG/M2 | DIASTOLIC BLOOD PRESSURE: 64 MMHG

## 2020-08-11 DIAGNOSIS — O09.93 SUPERVISION OF HIGH RISK PREGNANCY IN THIRD TRIMESTER: Primary | ICD-10-CM

## 2020-08-11 PROCEDURE — 99207 ZZC OB VISIT-NO CHARGE - GICH ONLY: CPT | Performed by: OBSTETRICS & GYNECOLOGY

## 2020-08-11 ASSESSMENT — PAIN SCALES - GENERAL: PAINLEVEL: NO PAIN (0)

## 2020-08-11 NOTE — PROGRESS NOTES
Return OB Visit    S: Patient is feeling well. Some ctx but less than her previous pregnancy. No VB or LOF. +FM    O: /64 (BP Location: Right arm, Patient Position: Sitting, Cuff Size: Adult Regular)   Pulse 100   Wt 70.1 kg (154 lb 8 oz)   LMP 2019 (Exact Date)   BMI 30.17 kg/m    Gen: Well-appearing, NAD  See OB Flowsheet    A/P:  Sofya Brown is a 35 year old  at 32w2d by LMP c/w 9w2d US, here for return OB visit.  Tobacco use: tried chantix but was too nauseated, patient trying lozenges.   Depression, anxiety: hydroxyzine prn  Hx of PPROM at 35w6d: declined [place]  Nausea, vomiting: improved  GERD: pepcid  Plans breastfeeding, epidural, Mirena     PNC: Rh positive, Rubella immune  Genetics: declines  Imaging: dating US at 9w2d, anatomy normal after follow up  Immunizations: s/p flu, will plan for Tdap vaccine while in the hospital due to a childhood reaction  RTC 2 weeks    Claudia Morfin MD  OB/GYN  2020 3:01 PM

## 2020-08-19 ENCOUNTER — NURSE TRIAGE (OUTPATIENT)
Dept: OBGYN | Facility: OTHER | Age: 35
End: 2020-08-19

## 2020-08-19 ENCOUNTER — THERAPY VISIT (OUTPATIENT)
Dept: CHIROPRACTIC MEDICINE | Facility: OTHER | Age: 35
End: 2020-08-19
Attending: CHIROPRACTOR
Payer: COMMERCIAL

## 2020-08-19 DIAGNOSIS — M54.50 LUMBAGO: Primary | ICD-10-CM

## 2020-08-19 DIAGNOSIS — M99.05 SEGMENTAL AND SOMATIC DYSFUNCTION OF PELVIC REGION: ICD-10-CM

## 2020-08-19 DIAGNOSIS — M54.6 PAIN IN THORACIC SPINE: ICD-10-CM

## 2020-08-19 DIAGNOSIS — M99.02 SEGMENTAL AND SOMATIC DYSFUNCTION OF THORACIC REGION: ICD-10-CM

## 2020-08-19 DIAGNOSIS — M99.01 SEGMENTAL AND SOMATIC DYSFUNCTION OF CERVICAL REGION: ICD-10-CM

## 2020-08-19 DIAGNOSIS — M54.2 CERVICALGIA: ICD-10-CM

## 2020-08-19 PROCEDURE — 98941 CHIROPRACT MANJ 3-4 REGIONS: CPT | Mod: AT | Performed by: CHIROPRACTOR

## 2020-08-19 PROCEDURE — 99212 OFFICE O/P EST SF 10 MIN: CPT | Mod: 25 | Performed by: CHIROPRACTOR

## 2020-08-19 NOTE — TELEPHONE ENCOUNTER
"Patient reports upper abdominal pain present for past 30 minutes. This happened after eating a bagel for breakfast and visiting the chiropractor this morning. She has experienced similar abdominal pain in the past with digestive issues, but not for some time.She states it is occasionally as high as 8 or 9/10, but overall is less than that. She reports her abdomen does not feel taut, just tender on palpation. She denies leaking and bleeding. She reports normal fetal movement this morning. Patient was offered to present to [facility] or monitor at home for no more than 2 hours and call back. Patient prefers to monitor at home for now and will call back with status update. She will present to [facility] is symptoms worsen or persist.    Ruth Valera RN...................2020 10:46 AM      Answer Assessment - Initial Assessment Questions  1. ONSET: \"When did the symptoms begin?\"         About 30 minutes ago, about 2 hours after chiropractor appointment.  2. CONTRACTIONS: \"Describe the contractions that you are having.\" (e.g., duration, frequency, regularity, severity)      No pattern, stomach not feeling hard, just tender  3. COLIN: \"What date are you expecting to deliver?\"      10/4/2020  4. PARITY: \"Have you had a baby before?\" If yes, \"How long did the labor last?\"      Yes, long induced labor  5. FETAL MOVEMENT: \"Has the baby's movement decreased or changed significantly from normal?\"      No concerns, was moving normally prior to crampiness starting  6. OTHER SYMPTOMS: \"Do you have any other symptoms?\" (e.g., leaking fluid from vagina, fever, hand/facial swelling)      none    Protocols used: PREGNANCY - LABOR - [id number]      "

## 2020-08-19 NOTE — PROGRESS NOTES
PATIENT:  Sofya Brown is a 35 year old female presenting for back and neck pain    PROBLEM:   Date of Initial Visit for this Episode:  8/19/2020    Visit #1    SUBJECTIVE / HPI: Patient presents with recent aggravation of neck and back pain symptoms which seem to began approximately 1 week ago.  Patient denied any traumatic events or overuse type injuries.  Patient states that she believes it is due to current state of pregnancy.  At time of patient visit patient is 33 weeks and 3 days.  Symptoms have not been improving and begun affecting many aspects of patient's daily life which is why she is presenting to our office at this time  Description and onset:  Duration and Frequency of Pain: 1 week and constantly achey, stabbing and tight  Radiation of pain: no  Pain rated at it's worst: 6/10  Pain rated currently:  6/10  Pain course: not improving  Worse with:  Nothing specific  Improved by:  Nothing  Other Health Care Providers seen for this: Patient has been under our care during course of pregnancy, see prior records for further details  Previous treatment: chiropractic          See flowsheets in chart for details.  8/19/2020   Neck Disability Index (  Linus ALEX. and Raudel WONG. 1991. All rights reserved.; used with permission) 8/19/2020   SECTION 1 - PAIN INTENSITY 2   SECTION 2 - PERSONAL CARE 0   SECTION 3 - LIFTING 4   SECTION 4 - READING 1   SECTION 5 - HEADACHES 1   SECTION 6 - CONCENTRATION 2   SECTION 7 - WORK 3   SECTION 8 - DRIVING 0   SECTION 9 - SLEEPING 2   SECTION 10 - RECREATION 1   Count 10   Sum 16   Raw Score: /50 16   Neck Disability Index Score: (%) 32     Oswestry ([place]) Questionnaire    OSWESTRY DISABILITY INDEX 8/19/2020   Count 9   Sum 17   Oswestry Score (%) 37.78   Some recent data might be hidden      Functional limitations:  Sitting >30 mins, Standing >30 mins, Sleeping and lifting    Past D.C. Care: yes, helpful        ROS:  The patient denies any fevers, chills, nausea, vomiting,  "diarrhea, constipation,dysuria, hematuria, or urinary hesitancy or incontinence.  No shortness of breath, chest pain, or rashes.    Cervical performed actively, measured approximately  ROM:   smooth/halting arc of motion   50/50 flexion    35/45 extension end range pain   35/45 RLF end range pain  45/45 LLF    80/85 RR         80/85 LR     -Maximal Foraminal Compression: -localized neck pain, -radicular symptoms.   -Distraction:      Thoracic and Lumbar performed actively, measured approximately  ROM:  35/60 flexion 35/60 extension with pain   45/45 RLF    40/45 LLF       +Kemps: lumbar spine bilaterally   +Leg length inequality: Left 1/4\"; Restricted left heel to buttock   Other:  +Ely's on left, -on right    +Tenderness: Present C2 on right, C6 on the left, intrascapular T-spine T2-T8 bilaterally, palpatory tenderness noted over the L5/S1 region bilaterally left side predominant  +Muscle spasm: None apparent.  Taut and tender fibers noted of the quadratus lumborum bilaterally left side predominant, lumbar paraspinals bilaterally, upper trapezius and T-spine paraspinals of the intrascapular region as well as of the cervical paraspinal musculature bilaterally  +Joint asymmetry and restriction: C2 right lateral flexion and extension restriction, see 6 extension right rotation restriction, T4 extension restriction, T8 extension restriction, left ilium PI listing    ASSESSMENT: Sofya Brown is a 35 year old female resenting with primary complaints of back and neck pain symptoms.  Patient is currently 33 weeks and 3 days pregnant at time of evaluation and treatment.  On prior pregnancy patient noted that she gave birth at 35 weeks.  I do believe that due to patient's current pregnancy neck and back pain symptoms are being aggravated.  Patient has been under our care with similar complaints in the past and responded favorably with treatment which I do believe to be the case at this time.     1. Lumbago    2. " Segmental and somatic dysfunction of pelvic region    3. Pain in thoracic spine    4. Segmental and somatic dysfunction of thoracic region    5. Cervicalgia    6. Segmental and somatic dysfunction of cervical region        PLAN    Evaluation and Management:  18481 Low to Moderate exam established patient 10 min    Procedures:  Modalities:  01597: MSTM:  To cervical paraspinals:   for 2 min    CMT:  31462 Chiropractic manipulative treatment 3-4 regions performed   Cervical: Diversified, C2, C6, Supine  Thoracic: Diversified, T4, T8, Prone  Pelvis: Drop Table, PSIS Left , Prone    Therapeutic procedures:  None    Response to Treatment  Reduction in symptoms as reported by patient    Prognosis: Good    8/19/2020 Plan of Care:  6-8 visits of Chiropractic Care including Spinal Adjustments and/or physiotherapy and active rehabilitation, to include exercises in the office and/or at home to meet care plan goals.     Frequency: 2xweek for up to 4 weeks. A reevaluation would be clinically appropriate in 6-8 visits, to determine progress and further course of care.    POC discussed and patient agreeable to plan of care.      8/19/2020 Goals:      Patient will report improved back pain by 50%.   Patient will report improved neck pain by 50%.   Patient will report able to sleep by 50% improvement.   Patient will demonstrate an improved ability to complete Activities of Daily Living  as shown by a reported 10-30% reduced score on neck and/or back index.    Patient will demonstrate improved ROM.        INSTRUCTIONS   rest    Follow-up:  Return to care in 2 days.        Disclaimer: This note consists of symbols derived from keyboarding, dictation and/or voice recognition software. As a result, there may be errors in the script that have gone undetected. Please consider this when interpreting information found in this chart.

## 2020-08-21 ENCOUNTER — THERAPY VISIT (OUTPATIENT)
Dept: CHIROPRACTIC MEDICINE | Facility: OTHER | Age: 35
End: 2020-08-21
Attending: CHIROPRACTOR
Payer: COMMERCIAL

## 2020-08-21 DIAGNOSIS — M54.6 PAIN IN THORACIC SPINE: ICD-10-CM

## 2020-08-21 DIAGNOSIS — M99.01 SEGMENTAL AND SOMATIC DYSFUNCTION OF CERVICAL REGION: ICD-10-CM

## 2020-08-21 DIAGNOSIS — M99.02 SEGMENTAL AND SOMATIC DYSFUNCTION OF THORACIC REGION: ICD-10-CM

## 2020-08-21 DIAGNOSIS — M54.2 CERVICALGIA: ICD-10-CM

## 2020-08-21 DIAGNOSIS — M54.50 LUMBAGO: ICD-10-CM

## 2020-08-21 DIAGNOSIS — M99.05 SEGMENTAL AND SOMATIC DYSFUNCTION OF PELVIC REGION: Primary | ICD-10-CM

## 2020-08-21 PROCEDURE — 98941 CHIROPRACT MANJ 3-4 REGIONS: CPT | Mod: AT | Performed by: CHIROPRACTOR

## 2020-08-21 NOTE — PROGRESS NOTES
Visit #:  2/6-8    Subjective:  Sofya Brown is a 35 year old female who is seen in f/u up for:        Segmental and somatic dysfunction of pelvic region  Lumbago  Pain in thoracic spine  Segmental and somatic dysfunction of thoracic region  Cervicalgia  Segmental and somatic dysfunction of cervical region.     Since last visit on 8/19/2020,  Sofya Brown reports: Noticing mild flareup of symptoms immediately following treatment.  Symptoms are showing signs of improvement however since that time.  She states that she has been using heat at night to help her sleep due to increased levels of back pain.    Area of chief complaint:  Cervical and Thoracic :  Symptoms are graded at 5/10. The quality is described as pinched constantly.   Thoracic and Lumbar :  Symptoms are graded at 0-8/10. The quality is described as achey, burning and tight occasionally. Tried heat which seems to help     Objective:  The following was observed:    P: palpatory tendernessLeft side T4, T8 midline, left PSIS, C2 on right, C6 on left:    A: static palpation demonstrates intersegmental asymmetry , cervical, thoracic, pelvis  R: motion palpation notes restricted motion, C2 , C6 , T4 , T8  and PSIS Left   T: Mild spasms apparent upper trapezius bilaterally, cervical paraspinal musculature bilaterally, left rhomboids, left quadratus lumborum,    Segmental spinal dysfunction/restrictions found at:  :  C2 Left rotation restricted, Right lateral flexion restricted and Extension restriction  C6 Right rotation restricted, Left lateral flexion restricted and Extension restriction  T4 Right rotation restricted and Extension restriction  T8 Extension restriction  PSIS Left PI listing.      Assessment: Patient is showing signs of progress currently.  Patient is currently 33 and 5/7 weeks pregnant.  Patient gave birth at 35 weeks gestation with last pregnancy.  Do believe current pregnancy contributing to high levels of pain as we are seeing at  this time.  Because of this recommend continuation twice a week care for the next 3 weeks.    Diagnoses:      1. Segmental and somatic dysfunction of pelvic region    2. Lumbago    3. Pain in thoracic spine    4. Segmental and somatic dysfunction of thoracic region    5. Cervicalgia    6. Segmental and somatic dysfunction of cervical region        Patient's condition:  Patient had restrictions pre-manipulation, Patient symptoms are gradually improving and Symptoms come and go    Treatment effectiveness:  Post manipulation there is better intersegmental movement and Patient claims to feel looser post manipulation      Procedures:  CMT:  33292 Chiropractic manipulative treatment 3-4 regions performed   Cervical: Diversified, C2, C6, Supine  Thoracic: Diversified, T4, T8, Prone  Pelvis: Drop Table, PSIS Left , Prone    Modalities:  None performed this visit    Therapeutic procedures:  None    Response to Treatment  Reduction in symptoms as reported by patient    Prognosis: Good    Progress towards Goals: Patient will report improved back pain by 50%.              Patient will report improved neck pain by 50%.              Patient will report able to sleep by 50% improvement.              Patient will demonstrate an improved ability to complete Activities of Daily Living   as shown by a reported 10-30% reduced score on neck and/or back index.               Patient will demonstrate improved ROM.     Recommendations:    Instructions:heat 15 minutes every other hour as needed and Utilize tennis ball for self myofascial release    Follow-up:  Return to care in 4 days.

## 2020-08-24 ENCOUNTER — THERAPY VISIT (OUTPATIENT)
Dept: CHIROPRACTIC MEDICINE | Facility: OTHER | Age: 35
End: 2020-08-24
Attending: CHIROPRACTOR
Payer: COMMERCIAL

## 2020-08-24 DIAGNOSIS — M99.01 SEGMENTAL AND SOMATIC DYSFUNCTION OF CERVICAL REGION: ICD-10-CM

## 2020-08-24 DIAGNOSIS — G89.29 UPPER BACK PAIN, CHRONIC: ICD-10-CM

## 2020-08-24 DIAGNOSIS — M99.05 SEGMENTAL AND SOMATIC DYSFUNCTION OF PELVIC REGION: Primary | ICD-10-CM

## 2020-08-24 DIAGNOSIS — M54.2 CERVICALGIA: ICD-10-CM

## 2020-08-24 DIAGNOSIS — M54.9 UPPER BACK PAIN, CHRONIC: ICD-10-CM

## 2020-08-24 DIAGNOSIS — M54.50 LUMBAGO: ICD-10-CM

## 2020-08-24 DIAGNOSIS — M54.6 PAIN IN THORACIC SPINE: ICD-10-CM

## 2020-08-24 DIAGNOSIS — M99.02 SEGMENTAL AND SOMATIC DYSFUNCTION OF THORACIC REGION: ICD-10-CM

## 2020-08-24 PROCEDURE — 98941 CHIROPRACT MANJ 3-4 REGIONS: CPT | Mod: AT | Performed by: CHIROPRACTOR

## 2020-08-24 NOTE — PROGRESS NOTES
Visit #:  3    Subjective:  Sofya Brown is a 35 year old female who is seen in f/u up for:        Segmental and somatic dysfunction of pelvic region  Lumbago  Pain in thoracic spine  Cervicalgia  Segmental and somatic dysfunction of cervical region  Upper back pain, chronic  Segmental and somatic dysfunction of thoracic region.     Since last visit on 8/21/2020,  Sofya Brown reports: Noticing improvement of symptoms since last visit.  Patient states that pain is much more tolerable during the day.  Patient notes that when she attempts to go to sleep symptoms are quite problematic though. Using heating pad at night to help sleep.    Area of chief complaint:  Cervical :  Symptoms are graded at 2-5/10. The quality is described as achey, and tight constantly.   Thoracic and Lumbar :  Symptoms are graded at 5-8/10. The quality is described as achey, burning and tight frequently.      Objective:  The following was observed:    P: palpatory tenderness Present right side C2, T4 and T9 midline, left PSIS:    A: static palpation demonstrates intersegmental asymmetry , cervical, thoracic, pelvis  R: motion palpation notes restricted motion, C2 , T4 , T9  and PSIS Left   T: muscle spasm at level(s): Suboccipitals bilaterally, lumbar paraspinals and quadratus lumborum bilaterally.  Taut tender fibers noted T-spine paraspinals intrascapularly:      Segmental spinal dysfunction/restrictions found at:  :  C2 Left rotation restricted, Right lateral flexion restricted and Extension restriction  T4 Extension restriction  T9 Extension restriction  PSIS Left PI listing.      Assessment: Patient is showing signs of progress with care.  Patient is approximately 34 weeks pregnant at time of service.  I do believe this to be a major contributing factor for slower progression of patient care as we are seeing at this time.  At this time I am recommending twice a week care during patient's course of treatment.  Patient did give birth  at 35 weeks gestation with last pregnancy.    Diagnoses:      1. Segmental and somatic dysfunction of pelvic region    2. Lumbago    3. Pain in thoracic spine    4. Cervicalgia    5. Segmental and somatic dysfunction of cervical region    6. Upper back pain, chronic    7. Segmental and somatic dysfunction of thoracic region        Patient's condition:  Patient had restrictions pre-manipulation, Patient symptoms are gradually improving and Symptoms come and go    Treatment effectiveness:  Post manipulation there is better intersegmental movement, Patient claims to feel looser post manipulation and Patients symptoms are getting better      Procedures:  CMT:  32132 Chiropractic manipulative treatment 3-4 regions performed   Cervical: Diversified, C2, Supine  Thoracic: Diversified, T4, T9, Prone  Pelvis: Drop Table, PSIS Left , Prone    Modalities:  None performed this visit    Therapeutic procedures:  None    Response to Treatment  Reduction in symptoms as reported by patient    Prognosis: Good    Progress towards Goals:Patient will report improved back pain by 50%. In progress              Patient will report improved neck pain by 50%.In progress              Patient will report able to sleep by 50% improvement.In progress              Patient will demonstrate an improved ability to complete Activities of Daily Living   as shown by a reported 10-30% reduced score on neck and/or back index. Not assessed              Patient will demonstrate improved ROM. In progress    Recommendations:    Instructions:ice 20 minutes every other hour as needed and heat 15 minutes every other hour as needed    Follow-up:  Return to care in 4 days.

## 2020-08-25 ENCOUNTER — PRENATAL OFFICE VISIT (OUTPATIENT)
Dept: OBGYN | Facility: OTHER | Age: 35
End: 2020-08-25
Attending: OBSTETRICS & GYNECOLOGY
Payer: COMMERCIAL

## 2020-08-25 VITALS — DIASTOLIC BLOOD PRESSURE: 50 MMHG | SYSTOLIC BLOOD PRESSURE: 118 MMHG | BODY MASS INDEX: 31.01 KG/M2 | WEIGHT: 158.8 LBS

## 2020-08-25 DIAGNOSIS — O09.93 SUPERVISION OF HIGH RISK PREGNANCY IN THIRD TRIMESTER: Primary | ICD-10-CM

## 2020-08-25 DIAGNOSIS — F32.A DEPRESSION, UNSPECIFIED DEPRESSION TYPE: ICD-10-CM

## 2020-08-25 PROCEDURE — 99207 ZZC OB VISIT-NO CHARGE - GICH ONLY: CPT | Performed by: OBSTETRICS & GYNECOLOGY

## 2020-08-25 RX ORDER — BUPROPION HYDROCHLORIDE 150 MG/1
150 TABLET ORAL EVERY MORNING
Qty: 30 TABLET | Refills: 1 | Status: ON HOLD | OUTPATIENT
Start: 2020-08-25 | End: 2020-09-23

## 2020-08-25 ASSESSMENT — MIFFLIN-ST. JEOR: SCORE: 1346.81

## 2020-08-25 ASSESSMENT — PAIN SCALES - GENERAL: PAINLEVEL: NO PAIN (0)

## 2020-08-25 NOTE — PROGRESS NOTES
Patient here for routine OB check. No new complaints or issues at this time. States she is interested in starting Wellbutrin for Post partum issues when appropriate.   Danika Morfin RN on 8/25/2020 at 3:22 PM

## 2020-08-25 NOTE — PROGRESS NOTES
Return OB Visit    S: Patient is feeling well. Some ctx, not as many as last pregnancy. No VB or LOF. +FM. Wants to start wellbutrin now in anticipation of postpartum depression    O: BP (P) 118/50 (BP Location: Right arm, Patient Position: Sitting, Cuff Size: Adult Regular)   Pulse (P) 92   Resp (P) 16   Ht (P) 1.524 m (5')   Wt (P) 72 kg (158 lb 12.8 oz)   LMP 2019 (Exact Date)   SpO2 (P) 99%   Breastfeeding No   BMI (P) 31.01 kg/m    Gen: Well-appearing, NAD  See OB Flowsheet    A/P:  Sofya Brown is a 35 year old  at 34w2d by LMP c/w 9w2d US, here for return OB visit.  Tobacco use: tried chantix but was too nauseated, patient trying lozenges.   Depression, anxiety: hydroxyzine prn, Rx sent for wellbutrin  Hx of PPROM at 35w6d: declined [place]  Nausea, vomiting: improved  GERD: pepcid  Plans breastfeeding, epidural, Mirena     PNC: Rh positive, Rubella immune  Genetics: declines  Imaging: dating US at 9w2d, anatomy normal after follow up  Immunizations: s/p flu, will plan for Tdap vaccine while in the hospital due to a childhood reaction  RTC 2 weeks    Claudia Morfin MD  OB/GYN  2020 3:32 PM

## 2020-08-27 ENCOUNTER — THERAPY VISIT (OUTPATIENT)
Dept: CHIROPRACTIC MEDICINE | Facility: OTHER | Age: 35
End: 2020-08-27
Attending: CHIROPRACTOR
Payer: COMMERCIAL

## 2020-08-27 DIAGNOSIS — M54.6 PAIN IN THORACIC SPINE: ICD-10-CM

## 2020-08-27 DIAGNOSIS — M99.04 SEGMENTAL AND SOMATIC DYSFUNCTION OF SACRAL REGION: Primary | ICD-10-CM

## 2020-08-27 DIAGNOSIS — M99.02 SEGMENTAL AND SOMATIC DYSFUNCTION OF THORACIC REGION: ICD-10-CM

## 2020-08-27 DIAGNOSIS — M54.2 CERVICALGIA: ICD-10-CM

## 2020-08-27 DIAGNOSIS — M54.50 LUMBAGO: ICD-10-CM

## 2020-08-27 DIAGNOSIS — M99.01 SEGMENTAL AND SOMATIC DYSFUNCTION OF CERVICAL REGION: ICD-10-CM

## 2020-08-27 PROCEDURE — 98941 CHIROPRACT MANJ 3-4 REGIONS: CPT | Mod: AT | Performed by: CHIROPRACTOR

## 2020-08-27 NOTE — PROGRESS NOTES
Visit #:  4    Subjective:  Sofya Brown is a 35 year old female who is seen in f/u up for:        Segmental and somatic dysfunction of sacral region  Lumbago  Segmental and somatic dysfunction of thoracic region  Pain in thoracic spine  Segmental and somatic dysfunction of cervical region  Cervicalgia.     Since last visit on 8/24/2020,  Sofya Brown reports: Not noticing much change in symptoms after last visit.  Patient states that she does find temporary relief from chiropractic adjustments however due to later stages of pregnancy pain seems to return.    Area of chief complaint:  Cervical :  Symptoms are graded at 3-6/10. The quality is described as constantly tight  Thoracic and Lumbar :  Symptoms are graded at 7-8/10. The quality is described as constantly achey and sharp     Objective:  The following was observed:    P: palpatory tenderness Present right side C2, left side C5, T5 and left PSIS:    A: static palpation demonstrates intersegmental asymmetry , cervical, thoracic, pelvis  R: motion palpation notes restricted motion, C2 , C5 , T4  and Sacrum   T: Moderate hypertonicity with palpatory tenderness noted along the quadratus lumborum bilaterally, upper trapezius bilaterally, and cervical paraspinal musculature bilaterally    Segmental spinal dysfunction/restrictions found at:  :  C2 Left rotation restricted, Right lateral flexion restricted and Extension restriction  C5 Left lateral flexion restricted and Extension restriction  T4 Extension restriction  Sacrum Left lateral flexion restricted and Extension restriction.      Assessment: Pain level continues to be quite elevated.  I do believe this is due to patient's later pregnancy stage.  At time of service provided patient is 34 weeks and 4 days gestation.  Patient stated with previous pregnancy giving birth at 35 weeks.  Recommend continuation of twice a week care.    Diagnoses:      1. Segmental and somatic dysfunction of sacral region    2.  Lumbago    3. Segmental and somatic dysfunction of thoracic region    4. Pain in thoracic spine    5. Segmental and somatic dysfunction of cervical region    6. Cervicalgia        Patient's condition:  Patient had restrictions pre-manipulation and Symptoms come and go    Treatment effectiveness:  Post manipulation there is better intersegmental movement and Post manipulation the patient improves and then feels more restricted motion over time      Procedures:  CMT:  92797 Chiropractic manipulative treatment 3-4 regions performed   Cervical: Diversified, C2, C5 , Supine  Thoracic: Diversified, T4, Prone  Pelvis: Drop Table, Sacrum , Prone    Modalities:  None performed this visit    Therapeutic procedures:  None    Response to Treatment  Reduction in symptoms as reported by patient    Prognosis: Good    Progress towards Goals: Patient will report improved back pain by 50%. In progress              Patient will report improved neck pain by 50%.In progress              Patient will report able to sleep by 50% improvement.In progress              Patient will demonstrate an improved ability to complete Activities of Daily Living   as shown by a reported 10-30% reduced score on neck and/or back index. Not assessed              Patient will demonstrate improved ROM. In progress    Recommendations:    Instructions:rest    Follow-up:  Return to care in 4 days.

## 2020-09-08 ENCOUNTER — PRENATAL OFFICE VISIT (OUTPATIENT)
Dept: OBGYN | Facility: OTHER | Age: 35
End: 2020-09-08
Attending: OBSTETRICS & GYNECOLOGY
Payer: COMMERCIAL

## 2020-09-08 VITALS
HEART RATE: 80 BPM | WEIGHT: 167 LBS | DIASTOLIC BLOOD PRESSURE: 80 MMHG | BODY MASS INDEX: 32.61 KG/M2 | SYSTOLIC BLOOD PRESSURE: 136 MMHG

## 2020-09-08 DIAGNOSIS — N89.8 VAGINAL DISCHARGE: ICD-10-CM

## 2020-09-08 DIAGNOSIS — O09.93 SUPERVISION OF HIGH RISK PREGNANCY IN THIRD TRIMESTER: Primary | ICD-10-CM

## 2020-09-08 LAB — A1 MICROGLOB PLACENTAL VAG QL: NEGATIVE

## 2020-09-08 PROCEDURE — 87081 CULTURE SCREEN ONLY: CPT | Mod: ZL | Performed by: OBSTETRICS & GYNECOLOGY

## 2020-09-08 PROCEDURE — 99207 ZZC OB VISIT-NO CHARGE - GICH ONLY: CPT | Performed by: OBSTETRICS & GYNECOLOGY

## 2020-09-08 PROCEDURE — 84112 EVAL AMNIOTIC FLUID PROTEIN: CPT | Mod: ZL | Performed by: OBSTETRICS & GYNECOLOGY

## 2020-09-08 ASSESSMENT — PAIN SCALES - GENERAL: PAINLEVEL: NO PAIN (0)

## 2020-09-08 NOTE — PROGRESS NOTES
Return OB Visit    S: Patient is feeling well. Had more watery discharge today. More ctx today but still irregular. No VB. +FM    O: /80 (BP Location: Right arm, Patient Position: Sitting, Cuff Size: Adult Large)   Pulse 80   Wt 75.8 kg (167 lb)   LMP 2019 (Exact Date)   Breastfeeding No   BMI (P) 32.61 kg/m    Gen: Well-appearing, NAD  See OB Flowsheet    A/P:  Sofya Brown is a 35 year old  at 36w2d by LMP c/w 9w2d US, here for return OB visit.  Tobacco use: tried chantix but was too nauseated, patient trying lozenges.   Depression, anxiety: hydroxyzine prn, wellbutrin  Hx of PPROM at 35w6d: declined [place]  Nausea, vomiting: improved  GERD: pepcid  Plans breastfeeding, epidural, Mirena     PNC: Rh positive, Rubella immune  Genetics: declines  Imaging: dating US at 9w2d, anatomy normal after follow up  Immunizations: s/p flu, will plan for Tdap vaccine while in the hospital due to a childhood reaction  RTC 2 weeks    Claudia Morfin MD  OB/GYN  2020 3:57 PM

## 2020-09-08 NOTE — NURSING NOTE
Chief Complaint   Patient presents with     Prenatal Care     36w2d   states having contractions every 10 min and sometimes more frequently. Has had watery discharge     Susan Birmingham LPN........................9/8/2020  3:54 PM     Medication Reconciliation: completed   Susan Birmingham LPN  9/8/2020 3:54 PM

## 2020-09-10 LAB
BACTERIA SPEC CULT: NORMAL
SPECIMEN SOURCE: NORMAL

## 2020-09-15 ENCOUNTER — PRENATAL OFFICE VISIT (OUTPATIENT)
Dept: OBGYN | Facility: OTHER | Age: 35
End: 2020-09-15
Attending: OBSTETRICS & GYNECOLOGY
Payer: COMMERCIAL

## 2020-09-15 VITALS
HEART RATE: 100 BPM | WEIGHT: 167.4 LBS | SYSTOLIC BLOOD PRESSURE: 136 MMHG | DIASTOLIC BLOOD PRESSURE: 74 MMHG | BODY MASS INDEX: 32.69 KG/M2

## 2020-09-15 DIAGNOSIS — O09.893 HISTORY OF PRETERM DELIVERY, CURRENTLY PREGNANT IN THIRD TRIMESTER: Primary | ICD-10-CM

## 2020-09-15 PROCEDURE — 99207 ZZC OB VISIT-NO CHARGE - GICH ONLY: CPT | Performed by: OBSTETRICS & GYNECOLOGY

## 2020-09-15 NOTE — PROGRESS NOTES
Return OB Visit    S: Patient is ready to be done. Not sleeping well, very uncomfortable. Ctx come and go. No VB. Same discharge as last week, not continuous. +FM    O: /74 (BP Location: Right arm, Patient Position: Sitting, Cuff Size: Adult Regular)   Pulse 100   LMP 2019 (Exact Date)   Gen: Well-appearing, NAD  See OB Flowsheet    A/P:  Sofya Brown is a 35 year old  at 37w2d by LMP c/w 9w2d US, here for return OB visit.  Tobacco use: tried chantix but was too nauseated, patient trying lozenges.   Depression, anxiety: hydroxyzine prn, wellbutrin  Hx of PPROM at 35w6d: declined Bhumi  Nausea, vomiting: improved  GERD: pepcid  Plans breastfeeding, epidural, Mirena     PNC: Rh positive, Rubella immune  Genetics: declines  Imaging: dating US at 9w2d, anatomy normal after follow up  Immunizations: s/p flu, will plan for Tdap vaccine while in the hospital due to a childhood reaction  RTC weekly until delivery    Claudia Morfin MD  OB/GYN  9/15/2020 2:58 PM

## 2020-09-21 ENCOUNTER — ANESTHESIA (OUTPATIENT)
Dept: OBGYN | Facility: OTHER | Age: 35
End: 2020-09-21
Payer: COMMERCIAL

## 2020-09-21 ENCOUNTER — ANESTHESIA EVENT (OUTPATIENT)
Dept: OBGYN | Facility: OTHER | Age: 35
End: 2020-09-21
Payer: COMMERCIAL

## 2020-09-21 ENCOUNTER — HOSPITAL ENCOUNTER (INPATIENT)
Facility: OTHER | Age: 35
LOS: 2 days | Discharge: HOME | End: 2020-09-23
Attending: FAMILY MEDICINE | Admitting: FAMILY MEDICINE
Payer: COMMERCIAL

## 2020-09-21 DIAGNOSIS — F32.A DEPRESSION, UNSPECIFIED DEPRESSION TYPE: ICD-10-CM

## 2020-09-21 PROBLEM — Z37.9 NORMAL LABOR: Status: ACTIVE | Noted: 2020-09-21

## 2020-09-21 LAB
A1 MICROGLOB PLACENTAL VAG QL: POSITIVE
ABO + RH BLD: NORMAL
ABO + RH BLD: NORMAL
SPECIMEN EXP DATE BLD: NORMAL

## 2020-09-21 PROCEDURE — 25000125 ZZHC RX 250: Performed by: NURSE ANESTHETIST, CERTIFIED REGISTERED

## 2020-09-21 PROCEDURE — 25800030 ZZH RX IP 258 OP 636: Performed by: FAMILY MEDICINE

## 2020-09-21 PROCEDURE — 37000011 ZZH ANESTHESIA WARD SERVICE

## 2020-09-21 PROCEDURE — 86901 BLOOD TYPING SEROLOGIC RH(D): CPT | Performed by: FAMILY MEDICINE

## 2020-09-21 PROCEDURE — 86900 BLOOD TYPING SEROLOGIC ABO: CPT | Performed by: FAMILY MEDICINE

## 2020-09-21 PROCEDURE — 25000132 ZZH RX MED GY IP 250 OP 250 PS 637: Performed by: FAMILY MEDICINE

## 2020-09-21 PROCEDURE — 12000000 ZZH R&B MED SURG/OB

## 2020-09-21 PROCEDURE — 25000125 ZZHC RX 250: Performed by: FAMILY MEDICINE

## 2020-09-21 PROCEDURE — 25000128 H RX IP 250 OP 636: Performed by: NURSE ANESTHETIST, CERTIFIED REGISTERED

## 2020-09-21 PROCEDURE — 84112 EVAL AMNIOTIC FLUID PROTEIN: CPT | Performed by: FAMILY MEDICINE

## 2020-09-21 PROCEDURE — 86780 TREPONEMA PALLIDUM: CPT | Performed by: FAMILY MEDICINE

## 2020-09-21 PROCEDURE — 36415 COLL VENOUS BLD VENIPUNCTURE: CPT | Performed by: FAMILY MEDICINE

## 2020-09-21 RX ORDER — LIDOCAINE HYDROCHLORIDE 10 MG/ML
INJECTION, SOLUTION INFILTRATION; PERINEURAL PRN
Status: DISCONTINUED | OUTPATIENT
Start: 2020-09-21 | End: 2020-09-22

## 2020-09-21 RX ORDER — NALOXONE HYDROCHLORIDE 0.4 MG/ML
.1-.4 INJECTION, SOLUTION INTRAMUSCULAR; INTRAVENOUS; SUBCUTANEOUS
Status: DISCONTINUED | OUTPATIENT
Start: 2020-09-21 | End: 2020-09-22

## 2020-09-21 RX ORDER — OXYTOCIN 10 [USP'U]/ML
10 INJECTION, SOLUTION INTRAMUSCULAR; INTRAVENOUS
Status: DISCONTINUED | OUTPATIENT
Start: 2020-09-21 | End: 2020-09-23 | Stop reason: HOSPADM

## 2020-09-21 RX ORDER — METHYLERGONOVINE MALEATE 0.2 MG/ML
200 INJECTION INTRAVENOUS
Status: DISCONTINUED | OUTPATIENT
Start: 2020-09-21 | End: 2020-09-23 | Stop reason: HOSPADM

## 2020-09-21 RX ORDER — NALOXONE HYDROCHLORIDE 0.4 MG/ML
.1-.4 INJECTION, SOLUTION INTRAMUSCULAR; INTRAVENOUS; SUBCUTANEOUS
Status: DISCONTINUED | OUTPATIENT
Start: 2020-09-21 | End: 2020-09-21

## 2020-09-21 RX ORDER — IBUPROFEN 400 MG/1
800 TABLET, FILM COATED ORAL
Status: DISCONTINUED | OUTPATIENT
Start: 2020-09-21 | End: 2020-09-22

## 2020-09-21 RX ORDER — CARBOPROST TROMETHAMINE 250 UG/ML
250 INJECTION, SOLUTION INTRAMUSCULAR
Status: DISCONTINUED | OUTPATIENT
Start: 2020-09-21 | End: 2020-09-23 | Stop reason: HOSPADM

## 2020-09-21 RX ORDER — LIDOCAINE 40 MG/G
CREAM TOPICAL
Status: DISCONTINUED | OUTPATIENT
Start: 2020-09-21 | End: 2020-09-23 | Stop reason: HOSPADM

## 2020-09-21 RX ORDER — ACETAMINOPHEN 325 MG/1
650 TABLET ORAL EVERY 4 HOURS PRN
Status: DISCONTINUED | OUTPATIENT
Start: 2020-09-21 | End: 2020-09-22

## 2020-09-21 RX ORDER — ONDANSETRON 2 MG/ML
4 INJECTION INTRAMUSCULAR; INTRAVENOUS EVERY 6 HOURS PRN
Status: DISCONTINUED | OUTPATIENT
Start: 2020-09-21 | End: 2020-09-23 | Stop reason: HOSPADM

## 2020-09-21 RX ORDER — FENTANYL/BUPIVACAINE/NS/PF 2-1250MCG
10 PLASTIC BAG, INJECTION (ML) INJECTION CONTINUOUS
Status: DISCONTINUED | OUTPATIENT
Start: 2020-09-21 | End: 2020-09-22 | Stop reason: CLARIF

## 2020-09-21 RX ORDER — LIDOCAINE HYDROCHLORIDE AND EPINEPHRINE 15; 5 MG/ML; UG/ML
INJECTION, SOLUTION EPIDURAL PRN
Status: DISCONTINUED | OUTPATIENT
Start: 2020-09-21 | End: 2020-09-22

## 2020-09-21 RX ORDER — OXYCODONE AND ACETAMINOPHEN 5; 325 MG/1; MG/1
1 TABLET ORAL
Status: DISCONTINUED | OUTPATIENT
Start: 2020-09-21 | End: 2020-09-23 | Stop reason: HOSPADM

## 2020-09-21 RX ORDER — LIDOCAINE HYDROCHLORIDE AND EPINEPHRINE 15; 5 MG/ML; UG/ML
3 INJECTION, SOLUTION EPIDURAL
Status: DISCONTINUED | OUTPATIENT
Start: 2020-09-21 | End: 2020-09-22 | Stop reason: CLARIF

## 2020-09-21 RX ORDER — FENTANYL CITRATE-0.9 % NACL/PF 10 MCG/ML
100 PLASTIC BAG, INJECTION (ML) INTRAVENOUS EVERY 5 MIN PRN
Status: DISCONTINUED | OUTPATIENT
Start: 2020-09-21 | End: 2020-09-22 | Stop reason: CLARIF

## 2020-09-21 RX ORDER — CALCIUM CARBONATE 500 MG/1
500 TABLET, CHEWABLE ORAL 3 TIMES DAILY PRN
Status: DISCONTINUED | OUTPATIENT
Start: 2020-09-21 | End: 2020-09-23 | Stop reason: HOSPADM

## 2020-09-21 RX ORDER — SODIUM CHLORIDE, SODIUM LACTATE, POTASSIUM CHLORIDE, CALCIUM CHLORIDE 600; 310; 30; 20 MG/100ML; MG/100ML; MG/100ML; MG/100ML
INJECTION, SOLUTION INTRAVENOUS CONTINUOUS
Status: DISCONTINUED | OUTPATIENT
Start: 2020-09-21 | End: 2020-09-22 | Stop reason: CLARIF

## 2020-09-21 RX ORDER — NALBUPHINE HYDROCHLORIDE 20 MG/ML
2.5-5 INJECTION, SOLUTION INTRAMUSCULAR; INTRAVENOUS; SUBCUTANEOUS EVERY 6 HOURS PRN
Status: DISCONTINUED | OUTPATIENT
Start: 2020-09-21 | End: 2020-09-23 | Stop reason: HOSPADM

## 2020-09-21 RX ADMIN — SODIUM CHLORIDE, POTASSIUM CHLORIDE, SODIUM LACTATE AND CALCIUM CHLORIDE: 600; 310; 30; 20 INJECTION, SOLUTION INTRAVENOUS at 20:01

## 2020-09-21 RX ADMIN — LIDOCAINE HYDROCHLORIDE 20 MG: 10 INJECTION, SOLUTION INFILTRATION; PERINEURAL at 22:21

## 2020-09-21 RX ADMIN — SODIUM CHLORIDE, POTASSIUM CHLORIDE, SODIUM LACTATE AND CALCIUM CHLORIDE: 600; 310; 30; 20 INJECTION, SOLUTION INTRAVENOUS at 21:33

## 2020-09-21 RX ADMIN — LIDOCAINE HYDROCHLORIDE AND EPINEPHRINE 5 ML: 15; 5 INJECTION, SOLUTION EPIDURAL at 22:25

## 2020-09-21 RX ADMIN — CALCIUM CARBONATE 500 MG: 500 TABLET ORAL at 18:14

## 2020-09-21 RX ADMIN — Medication 2 MILLI-UNITS/MIN: at 20:03

## 2020-09-21 RX ADMIN — Medication 10 ML/HR: at 22:39

## 2020-09-21 ASSESSMENT — LIFESTYLE VARIABLES: TOBACCO_USE: 1

## 2020-09-21 NOTE — H&P
OB ADMISSION NOTE    CHIEF COMPLAINT:  SROM    OBSTETRICAL / DATING HISTORY:  Estimated Date of Delivery: Oct 4, 2020  Gestational Age:  38w1d    OB History    Para Term  AB Living   2 1 0 1 0 1   SAB TAB Ectopic Multiple Live Births   0 0 0 0 1      # Outcome Date GA Lbr Torey/2nd Weight Sex Delivery Anes PTL Lv   2 Current            1  18 35w6d 09:42 / 10:38 2.651 kg (5 lb 13.5 oz) F Vag-Forceps EPI Y BRITNEY      Complications: Dysfunctional Labor, Failure to Progress in Second Stage      Name: ROSENDO SOLIS      Apgar1: 8  Apgar5: 9        TESTING:  Hemoglobin   Date Value Ref Range Status   2020 10.4 (L) 11.7 - 15.7 g/dL Final   ]  Lab Results   Component Value Date    ABO A 2020    RH Pos 2020    [id number] A Rh Positive 10/17/2017       HOME MEDICATIONS:  Wellbutrin - not started yet, atarax, prenatal vitamins, pepcid       PAST MEDICAL HISTORY:  Past Medical History:   Diagnosis Date     Dysplasia of cervix uteri     cryotherapy .     Family history of other specified conditions (CODE)     following Pertussis vaccination, last seizure .     Major depressive disorder, single episode     No Comments Provided     Migraine without status migrainosus, not intractable     No Comments Provided     Other specified postprocedural states     at [facility], abnormal pap smears times 2.  Two sexual partners in her left.        PAST SURGICAL HISTORY:  Past Surgical History:   Procedure Laterality Date     OTHER SURGICAL HISTORY      2 mo,,HERNIA REPAIR        ALLERGIES:     Allergies   Allergen Reactions     Pertussis Vaccines      Other reaction(s): Seizures     Sertraline      Other reaction(s): Dizziness          reports that she has been smoking cigarettes. She has been smoking about 0.02 packs per day. She has never used smokeless tobacco. She reports that she does not drink alcohol or use drugs.    PREGNANCY RISK FACTORS:  History of   delivery 2018; nicotine use   Depression and anxiety  - on wellburtrin and hydroxyzine    History of FAVD due to maternal fatigue    HISTORY OF PRESENT ILLNESS:    (Please see   sheets for prenatal history. Examination at the time of admission revealed no interval change in the patient s history or physical exam except as described below.)    Sofya Brown is a 35 year old female  at 38w1d who presents with SROM at 1500.  Clear fluid. Isn't feeling contractions. Baby active.  More edema the past week.  She is GBS negative.  She did not receive Bhumi during this pregnancy.      REVIEW OF SYSTEMS:  Review Of Systems - increased fatigue  Skin: negative  Eyes: negative  Ears/Nose/Throat: negative  Respiratory: No shortness of breath, dyspnea on exertion, cough, or hemoptysis  Cardiovascular: negative  Gastrointestinal: negative  Genitourinary: negative  Musculoskeletal: LE edema started last week  Neurologic: negative  Psychiatric: treated depression/anxiety  Hematologic/Lymphatic/Immunologic:   Hemoglobin   Date Value Ref Range Status   2020 10.4 (L) 11.7 - 15.7 g/dL Final   ]    Endocrine: negative; normal 3 hour gtt      PHYSICAL EXAM: /79   Temp 98.2  F (36.8  C) (Temporal)   Resp 16   LMP 2019 (Exact Date)   Physical Exam       General: alert, no distress  CV:  RRR, no murmur  Lungs - CTA  Abdomen - gravid  EXT:  2+ edema     Cervix 2-3 cm per RN     Membrane Status:  SROM   Fetal Presentation:vertex         Fetal monitoring: low risk  Contractions:  Every 2-4  Baseline: 145 BPM  Accels: present  Variability:  moderate  Decels:   none      Pelvic assessment for adequacy: yes     Impression:  1.  Sofya Brown is a 35 year old female   38w1d SROM   2.  Category 1 tracing.  3.  Nicotine use  4.  History of pertussis reaction  5.  Depression/anxiety   Plan:  1.EFM, [place], IV started   2.  Smoking cessation medication prescribed: has had prescriptions for  NRT  3.  Discussed indication for pitocin augmentation  - will recheck cervix when it has been two hours.    Lexus Erwin MD

## 2020-09-21 NOTE — PROGRESS NOTES
Patient to [facility] with c/o possible ROM.  To Room 405.  EUM/EFM applied.  Triage orders placed.

## 2020-09-21 NOTE — PROGRESS NOTES
S:  Patient reports that she feels mild contractions.       O:  /79   Temp 98.2  F (36.8  C) (Temporal)   Resp 16   LMP 2019 (Exact Date)   Fetal monitoring: low  risk  Contractions:  irregular  Baseline: 145 BPM  Accels: present   Variability:  moderate  Decels:   none  Cervix 3/70/-3      Assessment:  Sofya Brown is a 35 year old female  38w1d with SROM  Category 1 tracing  Plan: Discussed with pt indication for induction/augmentation of labor - SROM.Discussed monitoring (continuos).  Discussed risks including tachysystole and fetal distress.  Discussed increased risk/need for  with labor induction indication in general.  Pt voices good understanding and wishes to proceed.  Pitocin to be started.        MIKE DEAN MD ....................  2020   6:47 PM

## 2020-09-22 PROCEDURE — 25000128 H RX IP 250 OP 636: Performed by: NURSE ANESTHETIST, CERTIFIED REGISTERED

## 2020-09-22 PROCEDURE — 25000132 ZZH RX MED GY IP 250 OP 250 PS 637: Performed by: FAMILY MEDICINE

## 2020-09-22 PROCEDURE — 0HQ9XZZ REPAIR PERINEUM SKIN, EXTERNAL APPROACH: ICD-10-PCS | Performed by: FAMILY MEDICINE

## 2020-09-22 PROCEDURE — 59400 OBSTETRICAL CARE: CPT | Performed by: FAMILY MEDICINE

## 2020-09-22 PROCEDURE — 72200001 ZZH LABOR CARE VAGINAL DELIVERY SINGLE

## 2020-09-22 PROCEDURE — 25000125 ZZHC RX 250: Performed by: NURSE ANESTHETIST, CERTIFIED REGISTERED

## 2020-09-22 PROCEDURE — 12000000 ZZH R&B MED SURG/OB

## 2020-09-22 PROCEDURE — 25800030 ZZH RX IP 258 OP 636: Performed by: NURSE ANESTHETIST, CERTIFIED REGISTERED

## 2020-09-22 RX ORDER — ACETAMINOPHEN 325 MG/1
650 TABLET ORAL EVERY 4 HOURS PRN
Status: DISCONTINUED | OUTPATIENT
Start: 2020-09-22 | End: 2020-09-23 | Stop reason: HOSPADM

## 2020-09-22 RX ORDER — FAMOTIDINE 20 MG/1
20 TABLET, FILM COATED ORAL 2 TIMES DAILY
Status: DISCONTINUED | OUTPATIENT
Start: 2020-09-22 | End: 2020-09-23 | Stop reason: HOSPADM

## 2020-09-22 RX ORDER — AMOXICILLIN 250 MG
1 CAPSULE ORAL 2 TIMES DAILY
Status: DISCONTINUED | OUTPATIENT
Start: 2020-09-22 | End: 2020-09-23 | Stop reason: HOSPADM

## 2020-09-22 RX ORDER — IBUPROFEN 400 MG/1
800 TABLET, FILM COATED ORAL EVERY 6 HOURS PRN
Status: DISCONTINUED | OUTPATIENT
Start: 2020-09-22 | End: 2020-09-23 | Stop reason: HOSPADM

## 2020-09-22 RX ORDER — BISACODYL 10 MG
10 SUPPOSITORY, RECTAL RECTAL DAILY PRN
Status: DISCONTINUED | OUTPATIENT
Start: 2020-09-24 | End: 2020-09-23 | Stop reason: HOSPADM

## 2020-09-22 RX ORDER — AMOXICILLIN 250 MG
2 CAPSULE ORAL 2 TIMES DAILY
Status: DISCONTINUED | OUTPATIENT
Start: 2020-09-22 | End: 2020-09-23 | Stop reason: HOSPADM

## 2020-09-22 RX ORDER — OXYTOCIN 10 [USP'U]/ML
10 INJECTION, SOLUTION INTRAMUSCULAR; INTRAVENOUS
Status: DISCONTINUED | OUTPATIENT
Start: 2020-09-22 | End: 2020-09-23 | Stop reason: HOSPADM

## 2020-09-22 RX ORDER — NALOXONE HYDROCHLORIDE 0.4 MG/ML
.1-.4 INJECTION, SOLUTION INTRAMUSCULAR; INTRAVENOUS; SUBCUTANEOUS
Status: DISCONTINUED | OUTPATIENT
Start: 2020-09-22 | End: 2020-09-23 | Stop reason: HOSPADM

## 2020-09-22 RX ORDER — BUPROPION HYDROCHLORIDE 150 MG/1
150 TABLET ORAL DAILY
Status: DISCONTINUED | OUTPATIENT
Start: 2020-09-23 | End: 2020-09-23 | Stop reason: HOSPADM

## 2020-09-22 RX ORDER — MODIFIED LANOLIN
OINTMENT (GRAM) TOPICAL
Status: DISCONTINUED | OUTPATIENT
Start: 2020-09-22 | End: 2020-09-23 | Stop reason: HOSPADM

## 2020-09-22 RX ORDER — HYDROCORTISONE 2.5 %
CREAM (GRAM) TOPICAL 3 TIMES DAILY PRN
Status: DISCONTINUED | OUTPATIENT
Start: 2020-09-22 | End: 2020-09-23 | Stop reason: HOSPADM

## 2020-09-22 RX ADMIN — Medication 10 ML/HR: at 03:43

## 2020-09-22 RX ADMIN — FAMOTIDINE 20 MG: 20 TABLET ORAL at 19:03

## 2020-09-22 RX ADMIN — DOCUSATE SODIUM 50MG AND SENNOSIDES 8.6MG 1 TABLET: 8.6; 5 TABLET, FILM COATED ORAL at 20:34

## 2020-09-22 RX ADMIN — FAMOTIDINE 20 MG: 20 TABLET ORAL at 06:36

## 2020-09-22 RX ADMIN — IBUPROFEN 800 MG: 400 TABLET, FILM COATED ORAL at 18:58

## 2020-09-22 RX ADMIN — ACETAMINOPHEN 650 MG: 325 TABLET, FILM COATED ORAL at 04:34

## 2020-09-22 RX ADMIN — DOCUSATE SODIUM 50MG AND SENNOSIDES 8.6MG 1 TABLET: 8.6; 5 TABLET, FILM COATED ORAL at 10:58

## 2020-09-22 RX ADMIN — IBUPROFEN 800 MG: 400 TABLET, FILM COATED ORAL at 10:58

## 2020-09-22 RX ADMIN — ACETAMINOPHEN 650 MG: 325 TABLET, FILM COATED ORAL at 20:34

## 2020-09-22 NOTE — PLAN OF CARE
Patient is voiding without difficulty.  Using ice packs and bonita bottle to perineum.  Took Motrin x 1 this afternoon and using ice packs to neck.   and patient are providing all baby cares and bonding with .

## 2020-09-22 NOTE — ANESTHESIA PROCEDURE NOTES
Procedure note : epidural catheter      Staff -   CRNA: Chalo Harris APRN CRNA  Performed By: CRNA  Pre-Procedure    Location: OB    Procedure Times:9/21/2020 10:12 PM and 9/21/2020 10:40 PM  Pre-Anesthestic Checklist: patient identified, IV checked, site marked, risks and benefits discussed, informed consent, monitors and equipment checked, pre-op evaluation and at physician/surgeon's request    Timeout  Correct Patient: Yes   Correct Procedure: Yes   Correct Site: Yes     Correct Position: Yes     .   Procedure Documentation    Diagnosis:Labor Pain.    Procedure: epidural catheter, .   Patient Position:sitting Insertion Site:L3-4  (midline approach) Injection technique: LORT air   Local skin infiltrated with mL of 1% lidocaine.  ROYA at 5 cm    Patient Prep/Sterile Barriers; mask, sterile gloves, chlorhexidine gluconate and isopropyl alcohol, patient draped.  .  Needle: Touhy needle   Needle Gauge: 17.    Needle Length (Inches) 3.5   # of attempts: 1 and  # of redirects:  2 .    Catheter: 20 G . .  Catheter threaded easily  .  11 cm at skin.   .    Assessment/Narrative  Paresthesias: Yes and Resolved.  .  .  Aspiration negative for heme or CSF  . Test dose of mL lidocaine 1.5% w/ 1:200,000 epinephrine at. Test dose negative for signs of intravascular, subdural or intrathecal injection.

## 2020-09-22 NOTE — PROGRESS NOTES
Patient is having irregular contractions that she is reporting as lower uterine cramping.  IV initiated in left hand.  EUM/EFM off so patient could go to the bathroom and be up out of bed for a little while.  Dr. Erwin assessed patient at this time with patient having little to no cervical change since last exam.

## 2020-09-22 NOTE — ANESTHESIA POSTPROCEDURE EVALUATION
Patient: Sofya HEDRICK Happy    * No procedures listed *    Diagnosis:* No pre-op diagnosis entered *  Diagnosis Additional Information: No value filed.    Anesthesia Type:  Epidural    Note:  Anesthesia Post Evaluation    Patient location during evaluation: Bedside  Patient participation: Able to fully participate in evaluation  Level of consciousness: awake and alert  Pain management: adequate  Airway patency: patent  Cardiovascular status: acceptable  Respiratory status: acceptable  Hydration status: acceptable  PONV: none     Anesthetic complications: None    Comments: Patient was happy with her epidural.  She has been up walking around.  She has voided and reports no fever or chills.           Last vitals:  Vitals:    09/22/20 0520 09/22/20 0605 09/22/20 0809   BP: 127/69 121/58 122/61   Resp:   16   Temp:  96.6  F (35.9  C) 96.9  F (36.1  C)   SpO2:            Electronically Signed By: RHONDA NESS CRNA  September 22, 2020  1:15 PM

## 2020-09-22 NOTE — PROGRESS NOTES
Single viable baby boy born via spontaneous vaginal delivery on 09/22/20 at 0356. Delivered by PCJ. Spontaneous respirations, with vigorous cry.   Augmented  Labor at 38 weeks gestation   Mom's GBS status Negative with antibiotic treatment not indicated 4 hours prior to delivery.   baby placed on mother's abdomen for  ID bands applied to baby,mother, and S.O. Initial    Will continue to monitor and provide interventions as needed.

## 2020-09-22 NOTE — PROGRESS NOTES
Pt ambulated to the bathroom with standby nurse assist, she voided a large amount. Bleeding is moderate with one a walnut sized clot. Fundus found to be midline and firm when brought back to bed, will continue to monitor, VSS.

## 2020-09-22 NOTE — ANESTHESIA PREPROCEDURE EVALUATION
Anesthesia Pre-Procedure Evaluation    Patient: Sofya Brown   MRN: [id number] : 1985          Preoperative Diagnosis: * No pre-op diagnosis entered *    * No procedures listed *    Past Medical History:   Diagnosis Date     Dysplasia of cervix uteri     cryotherapy .     Family history of other specified conditions (CODE)     following Pertussis vaccination, last seizure .     Major depressive disorder, single episode     No Comments Provided     Migraine without status migrainosus, not intractable     No Comments Provided     Other specified postprocedural states     at [facility], abnormal pap smears times 2.  Two sexual partners in her left.     Past Surgical History:   Procedure Laterality Date     OTHER SURGICAL HISTORY      2 mo,,HERNIA REPAIR       Anesthesia Evaluation     . Pt has had prior anesthetic.            ROS/MED HX    ENT/Pulmonary:  - neg pulmonary ROS   (+)tobacco use, Current use 0.25 packs/day  , . .    Neurologic:  - neg neurologic ROS     Cardiovascular:  - neg cardiovascular ROS       METS/Exercise Tolerance:  >4 METS   Hematologic:  - neg hematologic  ROS       Musculoskeletal:  - neg musculoskeletal ROS       GI/Hepatic:     (+) GERD Symptomatic,       Renal/Genitourinary:  - ROS Renal section negative       Endo:  - neg endo ROS       Psychiatric:  - neg psychiatric ROS       Infectious Disease:  - neg infectious disease ROS       Malignancy:      - no malignancy   Other:    (+) Possibly pregnant                    neg OB ROS            Physical Exam  Normal systems: cardiovascular, pulmonary and dental    Airway   Mallampati: II  TM distance: > 3 FB  Neck ROM: full  Mouth opening: > 3 cm    Dental     Cardiovascular   Rhythm and rate: regular and normal      Pulmonary    breath sounds clear to auscultation            Lab Results   Component Value Date    WBC 10.9 2020    HGB 10.4 (L) 2020    HCT 31.6 (L) 2020     2020    NA  138 07/06/2020    POTASSIUM 3.4 (L) 07/06/2020    CHLORIDE 107 07/06/2020    CO2 22 07/06/2020    BUN 5 (L) 07/06/2020    CR 0.48 (L) 07/06/2020    GLC 82 07/06/2020    VU 9.0 07/06/2020    ALBUMIN 3.2 (L) 07/06/2020    PROTTOTAL 5.9 (L) 07/06/2020    ALT 8 07/06/2020    AST 11 (L) 07/06/2020    ALKPHOS 57 07/06/2020    BILITOTAL 0.2 (L) 07/06/2020    HCG Positive (A) 01/30/2020       Preop Vitals  BP Readings from Last 3 Encounters:   09/21/20 124/79   09/15/20 136/74   09/08/20 136/80    Pulse Readings from Last 3 Encounters:   09/15/20 100   09/08/20 80   08/25/20 (P) 92      Resp Readings from Last 3 Encounters:   09/21/20 16   08/25/20 (P) 16   07/06/20 20    SpO2 Readings from Last 3 Encounters:   08/25/20 (P) 99%   07/28/20 97%   07/06/20 100%      Temp Readings from Last 1 Encounters:   09/21/20 98.2  F (36.8  C) (Temporal)    Ht Readings from Last 1 Encounters:   08/25/20 (P) 1.524 m (5')      Wt Readings from Last 1 Encounters:   09/15/20 75.9 kg (167 lb 6.4 oz)    Estimated body mass index is 32.69 kg/m  (pended) as calculated from the following:    Height as of 8/25/20: (P) 1.524 m (5').    Weight as of 9/15/20: 75.9 kg (167 lb 6.4 oz).       Anesthesia Plan      History & Physical Review      ASA Status:  2 .  OB Epidural Asa: 2   NPO Status:  > 6 hours    Plan for Epidural            Postoperative Care      Consents  Anesthetic plan, risks, benefits and alternatives discussed with:  Patient and Patient..                 RHONDA NESS CRNA

## 2020-09-22 NOTE — L&D DELIVERY NOTE
OB Vaginal Delivery Note    Sofya Brown MRN# [id number]   Age: 35 year old YOB: 1985       GA: 38w2d  GP:   Labor Complications: None   EBL: 350  mL  Delivery QBL:    Delivery Type: Vaginal, Spontaneous   ROM to Delivery Time: (Delivered) Hours: 12 Minutes: 56   Weight:     1 Minute 5 Minute 10 Minute   Apgar Totals: 7   9             Delivery Details:  Sofya Brown, a 35 year old  female delivered a viable infant with apgars of 7  and 9 . Patient was fully dilated and pushing after 11  hours 0  minutes in active labor. Delivery was via vaginal, spontaneous  to a sterile field under epidural  anesthesia. Infant delivered in vertex  right  occiput  anterior  position. With delivery of head, dysotocia noted.  Gentle traction, suprapubic pressure, and Ras positioning used to deliver the anterior shoulder.  The cord was clamped, cut twice and   were noted. Cord blood was obtained in routine fashion with the following disposition: lab .      Cord complications:   none  Placenta delivered at 2020  4:05 AM . Placental disposition was Hospital disposal . Fundal massage performed and fundus found to be firm.     Episiotomy: none    Perineum, vagina, cervix were inspected, and the following lacerations were noted:   Perineal lacerations: 1st                Any lacerations were repaired in the usual fashion using 3-0 vicryl.    Excellent hemostasis was noted. Needle count correct. Infant and patient in delivery room in good and stable condition.        Labor Event Times    Labor onset date:  20 Onset time:   3:00 PM   Dilation complete date:  20 Complete time:   2:00 AM   Start pushing date/time:  2020 0250      Labor Length    1st Stage (hrs):  11 (min):  0   2nd Stage (hrs):  1 (min):  56   3rd Stage (hrs):  0 (min):  9      Labor Events     labor?:  No   steroids:  None  Labor Type:  Spontaneous     Antibiotics received during labor?:   No     Rupture identifier:  Sac 1  Rupture date/time: 20 1500   Rupture type:  Spontaneous rupture of membranes occuring during spontaneous labor or augmentation  Fluid color:  Clear  Fluid odor:  Normal     1:1 continuous labor support provided by?:  RN Labor partogram used?:  no      Delivery/Placenta Date and Time    Delivery Date:  20 Delivery Time:   3:56 AM   Placenta Date/Time:  2020  4:05 AM  Oxytocin given at the time of delivery:  after delivery of baby     Vaginal Counts     Initial count performed by 2 team members:   Two Team Members   Soraida Newsome RN       Needles Suture [place] [employer]ong[employer]   Initial counts 1 0 6 12   Added to count  1     Final counts       Placed during labor Accounted for at the end of labor   NA    NA    NA            Apgars    Living status:  Living   1 Minute 5 Minute 10 Minute 15 Minute 20 Minute   Skin color: 0  1       Heart rate: 2  2       Reflex irritability: 2  2       Muscle tone: 2  2       Respiratory effort: 1  2       Total: 7  9       Apgars assigned by:  JOSHUA ARREGUIN     Cord    Cord Blood Disposition:  Lab       [place] Resuscitation    Methods:  Suctioning  Output in Delivery Room:  Stool     Skin to Skin and Feeding Plan    Skin to skin initiation date/time: 1841    Skin to skin with:  Mother  Skin to skin end date/time:     How do you plan to feed your baby:  Breastfeeding     Labor Events and Shoulder Dystocia    Fetal Tracing Prior to Delivery:  Category 1  Shoulder dystocia present?:  Pos  Anterior shoulder:  left Time body delivered:  0356   Time head delivered:  0355    Time recognized:  2020 0355     Additional staff:  RT   Gentle attempt at traction, assisted by maternal expulsive forces?:  Yes       First Maneuver:  Ras maneuver, Suprapubic pressure    Time performed:  2020 035       Delivery (Maternal) (Provider to Complete) ([id number])    Episiotomy:  None  Perineal lacerations:  1st    Est. blood loss (mL):   350     Blood Loss  Mother: Sofya Brown #[id number]   Start of Mother's Information    IO Blood Loss  09/21/20 1500 - 09/22/20 0439    EBL (mL) Hospital Encounter 350 mL    Total  350 mL         End of Mother's Information  Mother: Sofya Brown #[id number]         Delivery - Provider to Complete ([id number])    Delivering clinician:  Lexus Simpson MD  Attempted Delivery Types (Choose all that apply):  Spontaneous Vaginal Delivery  Delivery Type (Choose the 1 that will go to the Birth History):  Vaginal, Spontaneous          Placenta    Delayed Cord Clamping:  Done  Date/Time:  9/22/2020  4:05 AM  Removal:  Spontaneous  Disposition:  Hospital disposal     Anesthesia    Method:  Epidural  Cervical dilation at placement:  0-3   Analgesic:   BIRTH HISTORY: ANALGESIC   ROPIVACAINE 0.1% FOR ANESTHESIA INGREDIENT         Presentation and Position    Presentation:  Vertex  Position:  Right Occiput Anterior           LEXUS DEAN MD

## 2020-09-22 NOTE — PROGRESS NOTES
Pt is requesting epidural, says contractions are getting painful. Category I tracing noted. LR fluid bolus started.

## 2020-09-23 VITALS
DIASTOLIC BLOOD PRESSURE: 73 MMHG | RESPIRATION RATE: 16 BRPM | OXYGEN SATURATION: 94 % | HEART RATE: 78 BPM | SYSTOLIC BLOOD PRESSURE: 125 MMHG | TEMPERATURE: 97.2 F

## 2020-09-23 LAB
HGB BLD-MCNC: 9.2 G/DL (ref 11.7–15.7)
T PALLIDUM AB SER QL: NONREACTIVE

## 2020-09-23 PROCEDURE — 36415 COLL VENOUS BLD VENIPUNCTURE: CPT | Performed by: FAMILY MEDICINE

## 2020-09-23 PROCEDURE — 25000132 ZZH RX MED GY IP 250 OP 250 PS 637: Performed by: FAMILY MEDICINE

## 2020-09-23 PROCEDURE — 90715 TDAP VACCINE 7 YRS/> IM: CPT | Performed by: FAMILY MEDICINE

## 2020-09-23 PROCEDURE — 85018 HEMOGLOBIN: CPT | Performed by: FAMILY MEDICINE

## 2020-09-23 PROCEDURE — 25000128 H RX IP 250 OP 636: Performed by: FAMILY MEDICINE

## 2020-09-23 RX ORDER — BUPROPION HYDROCHLORIDE 150 MG/1
150 TABLET ORAL EVERY MORNING
Qty: 30 TABLET | Refills: 1 | Status: SHIPPED | OUTPATIENT
Start: 2020-09-23 | End: 2021-01-07

## 2020-09-23 RX ADMIN — DOCUSATE SODIUM 50MG AND SENNOSIDES 8.6MG 1 TABLET: 8.6; 5 TABLET, FILM COATED ORAL at 08:19

## 2020-09-23 RX ADMIN — TETANUS TOXOID, REDUCED DIPHTHERIA TOXOID AND ACELLULAR PERTUSSIS VACCINE, ADSORBED 0.5 ML: 5; 2.5; 8; 8; 2.5 SUSPENSION INTRAMUSCULAR at 10:57

## 2020-09-23 RX ADMIN — IBUPROFEN 800 MG: 400 TABLET, FILM COATED ORAL at 08:19

## 2020-09-23 RX ADMIN — BUPROPION HYDROCHLORIDE 150 MG: 150 TABLET, FILM COATED, EXTENDED RELEASE ORAL at 10:56

## 2020-09-23 RX ADMIN — ACETAMINOPHEN 650 MG: 325 TABLET, FILM COATED ORAL at 01:15

## 2020-09-23 RX ADMIN — FAMOTIDINE 20 MG: 20 TABLET ORAL at 08:19

## 2020-09-23 NOTE — PLAN OF CARE
Patient verbalizes that pain is under control with ice, heat, Ibuprofen, Tylenol.  Excited to go home this afternoon.  Bonding with baby.  Patient and  providing all  cares.  Given discharge instructions via AVS and she verbalizes understanding of follow-up care needed and warning s/s to watch for and when to seek medical care.  Ambulatory to hospital exit with staff.

## 2020-09-23 NOTE — PROGRESS NOTES
Discussed with patient her pertussis allergy.  States it was related to receiving a vaccine as a child and she had a reaction of having seizures.  She has spoken to several doctors about getting the Tdap after delivering her baby and she states that she has gotten information on the current vaccine compared to the vaccine that was used in the past and that a reaction this time would be low.  She wishes to proceed with getting the Tdap vaccine at this time.

## 2020-09-23 NOTE — DISCHARGE SUMMARY
VAGINAL DELIVERY DISCHARGE SUMMARY    Admit date: 2020  Discharge date: 2020     Admit Dx:   - 35 year old  at 38w1d   - SROM    Discharge Dx:  - Same as above, s/p     Procedures:  - Spontaneous vaginal delivery  - Epidural analgesia    Admit HPI:  Ms. Sofya Brown is a   at 38w1d who was admitted for SROM on 2020. Please see her admit H&P for full details of her PMH, PSH, Meds, Allergies and exam on admit.    Hospital course:  Sofya Brown was admitted to the hospital on 2020 for the above listed indications. Her labor progressed with pitocin augmentation.She progressed to complete dilation and delivered a male infant in the KATY position. Delivery notable for a shoulder dystocia requiring suprapubic pressure and McRobert's position to deliver the anterior shoulder. APGARS were 7/9. Weight was 4051g. EBL from the delivery was 350. Please see her Delivery Summary for full details regarding her delivery.    Her postpartum course was complicated by nothing. On PPD#1, she was meeting all of her postpartum goals and deemed stable for discharge. She was voiding without difficulty, tolerating a regular diet without nausea and vomiting, her pain was well controlled on oral pain medicines and her lochia was appropriate. Her hemoglobin after delivery was 9.2. she was asymptomatic and will be discharged with iron. Her Rh status was positive, and Rhogam was not indicated.     Discharge Medications:  Current Discharge Medication List      CONTINUE these medications which have CHANGED    Details   buPROPion (WELLBUTRIN XL) 150 MG 24 hr tablet Take 1 tablet (150 mg) by mouth every morning  Qty: 30 tablet, Refills: 1    Associated Diagnoses: Depression, unspecified depression type         CONTINUE these medications which have NOT CHANGED    Details   cholecalciferol (VITAMIN D3) 5000 units (125 mcg) capsule Take 5,000 Units by mouth daily      famotidine (PEPCID) 20 MG tablet Take 1  tablet (20 mg) by mouth 2 times daily  Qty: 90 tablet, Refills: 1    Associated Diagnoses: Gastroesophageal reflux disease, esophagitis presence not specified      Magnesium Oxide 500 MG TABS Take 1 tablet by mouth daily      Prenatal Vit-Fe Fumarate-FA (PRENATAL MULTIVITAMIN W/IRON) 27-0.8 MG tablet Take 1 tablet by mouth daily      ALPRAZolam (XANAX) 0.25 MG tablet Take 1 tablet (0.25 mg) by mouth 2 times daily as needed for anxiety  Qty: 90 tablet, Refills: 1    Associated Diagnoses: Anxiety state      hydrOXYzine (ATARAX) 25 MG tablet Take 1 tablet (25 mg) by mouth every 6 hours as needed for anxiety  Qty: 30 tablet, Refills: 1    Associated Diagnoses: Anxiety      nicotine (COMMIT) 2 MG lozenge Place 1 lozenge (2 mg) inside cheek every hour as needed for smoking cessation  Qty: 108 lozenge, Refills: 1    Associated Diagnoses: Encounter for smoking cessation counseling      nicotine (NICODERM CQ) 7 MG/24HR 24 hr patch Place 1 patch onto the skin every 24 hours  Qty: 30 patch, Refills: 3    Associated Diagnoses: Maternal tobacco use in second trimester      ondansetron (ZOFRAN) 4 MG tablet Take 1 tablet (4 mg) by mouth every 8 hours as needed for nausea  Qty: 30 tablet, Refills: 1    Associated Diagnoses: Nausea and vomiting in pregnancy             Discharge/Disposition:  Sofya Brown was discharged to home in stable condition with the following instructions/medications:  1) Call for temperature > 100.4, bright red vaginal bleeding >1 pad an hour x 2 hours, foul smelling vaginal discharge, pain not controlled by usual oral pain meds, persistent nausea and vomiting not controlled on medications  2) She desired Mirena for contraception.  3) For feeding she decided to bottle feed.  4) She was instructed to follow-up with Dr ASHA Morfin in 6 weeks for a routine postpartum visit and IUD insertion.      Claudia Morfin MD  OBGYN  9/23/2020 8:00 AM

## 2020-09-23 NOTE — PLAN OF CARE
Pt is doing well, VSS. She has decided to bottle feed this infant. Fundus is firm, bleeding is light. She is very attentive to infant and bonding well. Pain is controlled with Ibuprofen and Tylenol. Pt would like to discharge to home today.

## 2020-09-23 NOTE — PROGRESS NOTES
OB/GYN Postpartum Note      S:  Patient is feeling well this morning.  Lochia = heavy menses.  Eating and drinking without nausea.  Ambulating without difficulty.  Pain is well controlled. Has decided to bottle feed. Would like to go home.    O:   Vitals:    20 0809 20 1538 20 0107 20 0108   BP: 122/61 130/62  117/57   Pulse:   78    Resp: 16 16 15    Temp: 96.9  F (36.1  C) 98.2  F (36.8  C) 96.6  F (35.9  C)    TempSrc:   Temporal    SpO2:         General: resting in bed, in NAD  Resp: nonlabored  Abdomen: soft, nontender, nondistended  Fundus firm at umbilicus-1  Extremities: nontender    Hemoglobin   Date Value Ref Range Status   2020 9.2 (L) 11.7 - 15.7 g/dL Final   ]    A: Ms. Sofya Brown is a 35 year old  PPD #1 s/p     P:  GI: tolerating regular diet  Feeding: bottle  Contraception: Mirena  Rubella Immune  Rh positive  Disposition: routine PP cares, anticipate discharge today    Claudia Morfin MD  OB/GYN  2020 8:00 AM

## 2020-11-03 ENCOUNTER — PRENATAL OFFICE VISIT (OUTPATIENT)
Dept: OBGYN | Facility: OTHER | Age: 35
End: 2020-11-03
Attending: OBSTETRICS & GYNECOLOGY
Payer: COMMERCIAL

## 2020-11-03 VITALS
HEART RATE: 60 BPM | BODY MASS INDEX: 27.93 KG/M2 | WEIGHT: 143 LBS | SYSTOLIC BLOOD PRESSURE: 122 MMHG | DIASTOLIC BLOOD PRESSURE: 80 MMHG

## 2020-11-03 DIAGNOSIS — Z01.812 PRE-PROCEDURE LAB EXAM: ICD-10-CM

## 2020-11-03 DIAGNOSIS — Z30.430 ENCOUNTER FOR IUD INSERTION: ICD-10-CM

## 2020-11-03 LAB — HCG UR QL: NEGATIVE

## 2020-11-03 PROCEDURE — 81025 URINE PREGNANCY TEST: CPT | Mod: ZL | Performed by: OBSTETRICS & GYNECOLOGY

## 2020-11-03 PROCEDURE — 58300 INSERT INTRAUTERINE DEVICE: CPT | Performed by: OBSTETRICS & GYNECOLOGY

## 2020-11-03 PROCEDURE — 99207 PR POST-PARTUM 6 WK VISIT - GICH ONLY: CPT | Performed by: OBSTETRICS & GYNECOLOGY

## 2020-11-03 PROCEDURE — 250N000011 HC RX IP 250 OP 636: Performed by: OBSTETRICS & GYNECOLOGY

## 2020-11-03 RX ADMIN — LEVONORGESTREL 20 MCG: 52 INTRAUTERINE DEVICE INTRAUTERINE at 14:00

## 2020-11-03 SDOH — HEALTH STABILITY: MENTAL HEALTH: HOW MANY STANDARD DRINKS CONTAINING ALCOHOL DO YOU HAVE ON A TYPICAL DAY?: NOT ASKED

## 2020-11-03 SDOH — HEALTH STABILITY: MENTAL HEALTH: HOW OFTEN DO YOU HAVE 6 OR MORE DRINKS ON ONE OCCASION?: NOT ASKED

## 2020-11-03 SDOH — HEALTH STABILITY: MENTAL HEALTH: HOW OFTEN DO YOU HAVE A DRINK CONTAINING ALCOHOL?: NOT ASKED

## 2020-11-03 ASSESSMENT — PAIN SCALES - GENERAL: PAINLEVEL: NO PAIN (0)

## 2020-11-03 NOTE — PROGRESS NOTES
"6 week Postpartum Visit Note    S:  Ms. Sofya Brown is a 35 year old  here for her 6-week postpartum checkup.   - Had a  on 20.  - Infant gender:  boy, weight 8 pounds 14.9 oz.  - Feeding Method:  Bottlefed.  Complications reported with feeding:  none, infant thriving .    - Bleeding:  Still having dark brown discharge, no heavy bleeding.  Menses resumed:  unsure, had more red bleeding last week  - Bowel/Urinary problems:  No  - Mood: good  - Sleep: not great, baby still eating every 2 hrs at night    - Contraception Planned:  IUD Mirena  - She  Has not had intercourse since delivery.    - Current tobacco use:  Yes   - Hx of Abuse:  No  ================================================================  ROS: 10 point ROS neg other than the symptoms noted above in the HPI.     O:  /80 (BP Location: Right arm, Patient Position: Sitting, Cuff Size: Adult Large)   Pulse 60   Wt 64.9 kg (143 lb)   LMP 2019 (Exact Date)   Breastfeeding No   BMI (P) 27.93 kg/m    Gen: Well-appearing, NAD  Psych:  Appropriate mood and affect  Breast: Deferred, no concerns  Abd:  Benign and Soft, flat, non-tender  Pelvic: Well healed perineum. Normal vagina and cervix. Uterus normal size, mobile, anteverted, nontender. No adnexal masses or tenderness.     IUD Insertion Note:      Time Out - \"Pause for the Cause\"  Just before the procedure begins, through verbal and active participation of team members, verify:    Initials   Patient Name    Sofya Brown    Patient Date of Birth 1985    Procedure to be performed  IUD insertion     Consent:  Risks, benefits of treatment, and no treatment were discussed.  Patient's questions were elicited and answered.     After informed consent was obtained from the patient, a speculum was placed in the vagina to visualized the cervix.  The cervix was then swabbed with a betadine prep x 3.   Tenaculum was placed at the 12 o'clock position on the cervix and the uterus " sounded to 7.5 cm.  The Mirena  IUD was then placed in the usual fashion under sterile technique.  Strings were clipped about 2 cm from the cervical os.  Tenaculum was removed and cervix was hemostatic.  There were no complications.  The patient tolerated the procedure well.    Lot: [id number]  Exp: 2022    A/P:  Ms. Sofya Brown is a 35 year old  here for 6 week postpartum visit after . Doing well.  - Contraception: Mirena  - Feeding: bottle  - Follow-up: 1 month IUD check    Claudia Morfin MD  OB/GYN  11/3/2020 1:06 PM

## 2020-11-03 NOTE — NURSING NOTE
Chief Complaint   Patient presents with     Postpartum Care     6 week post partum      Prior to the start of the procedure and with procedural staff participation, I verbally confirmed the patient s identity using two indicators, relevant allergies, that the procedure was appropriate and matched the consent or emergent situation, and that the correct equipment/implants were available. Immediately prior to starting the procedure I conducted the Time Out with the procedural staff and re-confirmed the patient s name, procedure, and site/side. (The Joint Commission universal protocol was followed.)  Yes    Sedation (Moderate or Deep): None    Medication Reconciliation: completed   Susan Birmingham LPN  11/3/2020 12:50 PM

## 2020-11-24 ENCOUNTER — MEDICAL CORRESPONDENCE (OUTPATIENT)
Dept: HEALTH INFORMATION MANAGEMENT | Facility: OTHER | Age: 35
End: 2020-11-24

## 2021-01-07 ENCOUNTER — VIRTUAL VISIT (OUTPATIENT)
Dept: FAMILY MEDICINE | Facility: OTHER | Age: 36
End: 2021-01-07
Attending: FAMILY MEDICINE
Payer: COMMERCIAL

## 2021-01-07 DIAGNOSIS — M54.2 CERVICALGIA: Primary | ICD-10-CM

## 2021-01-07 DIAGNOSIS — F41.9 SEVERE ANXIETY: ICD-10-CM

## 2021-01-07 DIAGNOSIS — Z72.0 TOBACCO ABUSE: ICD-10-CM

## 2021-01-07 DIAGNOSIS — F32.2 SEVERE DEPRESSION (H): ICD-10-CM

## 2021-01-07 PROCEDURE — 99213 OFFICE O/P EST LOW 20 MIN: CPT | Mod: GT | Performed by: PHYSICIAN ASSISTANT

## 2021-01-07 RX ORDER — NICOTINE 21 MG/24HR
1 PATCH, TRANSDERMAL 24 HOURS TRANSDERMAL EVERY 24 HOURS
Qty: 42 PATCH | Refills: 0 | Status: SHIPPED | OUTPATIENT
Start: 2021-01-07 | End: 2021-04-12

## 2021-01-07 RX ORDER — BUPROPION HYDROCHLORIDE 150 MG/1
150 TABLET ORAL EVERY MORNING
Qty: 30 TABLET | Refills: 2 | Status: SHIPPED | OUTPATIENT
Start: 2021-01-07 | End: 2021-02-25

## 2021-01-07 RX ORDER — BUSPIRONE HYDROCHLORIDE 5 MG/1
TABLET ORAL
Qty: 60 TABLET | Refills: 3 | Status: SHIPPED | OUTPATIENT
Start: 2021-01-07 | End: 2021-02-25

## 2021-01-07 RX ORDER — NICOTINE 21 MG/24HR
1 PATCH, TRANSDERMAL 24 HOURS TRANSDERMAL EVERY 24 HOURS
Qty: 14 PATCH | Refills: 0 | Status: SHIPPED | OUTPATIENT
Start: 2021-01-07 | End: 2021-04-12

## 2021-01-07 ASSESSMENT — ANXIETY QUESTIONNAIRES
1. FEELING NERVOUS, ANXIOUS, OR ON EDGE: NEARLY EVERY DAY
5. BEING SO RESTLESS THAT IT IS HARD TO SIT STILL: NEARLY EVERY DAY
3. WORRYING TOO MUCH ABOUT DIFFERENT THINGS: NEARLY EVERY DAY
IF YOU CHECKED OFF ANY PROBLEMS ON THIS QUESTIONNAIRE, HOW DIFFICULT HAVE THESE PROBLEMS MADE IT FOR YOU TO DO YOUR WORK, TAKE CARE OF THINGS AT HOME, OR GET ALONG WITH OTHER PEOPLE: VERY DIFFICULT
GAD7 TOTAL SCORE: 21
6. BECOMING EASILY ANNOYED OR IRRITABLE: NEARLY EVERY DAY
2. NOT BEING ABLE TO STOP OR CONTROL WORRYING: NEARLY EVERY DAY
7. FEELING AFRAID AS IF SOMETHING AWFUL MIGHT HAPPEN: NEARLY EVERY DAY

## 2021-01-07 ASSESSMENT — PATIENT HEALTH QUESTIONNAIRE - PHQ9
5. POOR APPETITE OR OVEREATING: NEARLY EVERY DAY
SUM OF ALL RESPONSES TO PHQ QUESTIONS 1-9: 11

## 2021-01-07 NOTE — PROGRESS NOTES
Sofya Brown is a 35 year old female who is being evaluated via a billable video visit.      How would you like to obtain your AVS? MyChart  If the video visit is dropped, the invitation should be resent by: Text to cell phone: [phone]  Will anyone else be joining your video visit? No      Video Start Time: 9:01 AM  Assessment & Plan   Problem List Items Addressed This Visit     None      Visit Diagnoses     Cervicalgia    -  Primary    Relevant Medications    tiZANidine (ZANAFLEX) 4 MG tablet    Severe anxiety        Relevant Medications    buPROPion (WELLBUTRIN XL) 150 MG 24 hr tablet    busPIRone (BUSPAR) 5 MG tablet    Severe depression (H)        Relevant Medications    buPROPion (WELLBUTRIN XL) 150 MG 24 hr tablet    busPIRone (BUSPAR) 5 MG tablet    Tobacco abuse        Relevant Medications    nicotine (NICODERM CQ) 14 MG/24HR 24 hr patch    nicotine (NICODERM CQ) 7 MG/24HR 24 hr patch    nicotine (NICODERM CQ) 21 MG/24HR 24 hr patch         Cervicalgia: Patient was given tizanidine to use as needed for symptomatic relief.  Gave side effect profile.  Encouraged to take ibuprofen for relief up to 4 times per day.  Encouraged rest and elevation.  Encouraged to use ice or heat 15 minutes at a time several times per day to decrease pain. Return to clinic in 1-2 weeks as necessary for persistent pain. Return to clinic with any change or worsening of symptoms.     Anxiety and depression: Patient was started on Wellbutrin and BuSpar for treatment.  Gave side effect profile.  Encourage good diet and exercise.  Encouraged seeing a counselor if preferred.  Return in 4 to 6 weeks for recheck.    Tobacco abuse: Patient was given nicotine patches for smoking cessation.  Gave patient education.     Tobacco Cessation:   reports that she has been smoking cigarettes. She has been smoking about 1.00 pack per day. She has never used smokeless tobacco.  Tobacco Cessation Action Plan: Pharmacotherapies : Nicotine  patch      Return in about 4 weeks (around 2021).    Chelle Queen PA-C  [facility]    Subjective     Sofya Brown is a 35 year old who presents to clinic today for the following health issues     HPI     Just had a son end of Sep        Patient states that she has a  son that was born the end of September.  She is very rundown and tired.  History of depression in the past.  She also has a 3-year-old daughter that keeps her busy.  Her son does not sleep well.  They have told him constantly.  Likely increasing her depression and anxiety concerns.  She is currently working from home.  Easily distractible.  No suicidal or homicidal ideation.  History of being on Wellbutrin.  Tolerated the medication well in the past.  History of taking Xanax as needed however she only has taken 1 pill previously.  Did not end up taking hydroxyzine which was previously prescribed.  Stopped Wellbutrin a few years ago.  Interested in restarting.  She feels like she is in a constant state of anxiety.  Constantly on the go.  Seeing a counselor.  No suicidal or homicidal ideation.    Gets a lot of muscle tension in her neck and back.  She is sitting at the computer all day long.  History of using muscle relaxer in the past.  Flexeril gave her restless leg syndrome.  Wondering about getting a different muscle relaxer.  No recent trauma or injury.    Interested in quitting smoking.  Wondering about using the patches.     Review of Systems   Constitutional, HEENT, cardiovascular, pulmonary, gi and gu systems are negative, except as otherwise noted.      Objective           Vitals:  No vitals were obtained today due to virtual visit.    Physical Exam   GENERAL: Healthy, alert and no distress  EYES: Eyes grossly normal to inspection.  No discharge or erythema, or obvious scleral/conjunctival abnormalities.  RESP: No audible wheeze, cough, or visible cyanosis.  No visible retractions or increased work of  breathing.    SKIN: Visible skin clear. No significant rash, abnormal pigmentation or lesions.  NEURO: Cranial nerves grossly intact.  Mentation and speech appropriate for age.  PSYCH: Mentation appears normal, affect normal/bright, judgement and insight intact, normal speech and appearance well-groomed. No SI or HI.             Video-Visit Details    Type of service:  Video Visit    Video End Time:9:19 AM    Originating Location (pt. Location): Home    Distant Location (provider location):  [facility]     Platform used for Video Visit: [employer]

## 2021-01-07 NOTE — PATIENT INSTRUCTIONS
Started on wellbutrin and buspar.  Encouraged good diet and exercise.   Encouraged to see a counselor.   Return in 4-6 weeks for recheck.   Return to clinic with change/worsening of symptoms.       Suicide Emergency First call for help:  [phone]    Depression: Tips to Help Yourself  As your healthcare providers help treat your depression, you can also help yourself. Keep in mind that your illness affects you emotionally, physically, mentally, and socially. So full recovery will take time. Take care of your body and your soul, and be patient with yourself as you get better.    Self-care    Educate yourself. Read about treatment and medicine options. If you have the energy, attend local conferences or support groups. Keep a list of useful websites and helpful books and use them as needed. This illness is not your fault. Don t blame yourself for your depression.    Manage early symptoms. If you notice symptoms returning, experience triggers, or identify other factors that may lead to a depressive episode, get help as soon as possible. Ask trusted friends and family to monitor your behavior and let you know if they see anything of concern.    Work with your provider. Find a provider you can trust. Communicate honestly with that person and share information on your treatment for depression and your reaction to medicines.    Be prepared for a crisis. Know what to do if you experience a crisis. Keep the phone number of a crisis hotline and know the location of your community's urgent care centers and the closest emergency department.    Hold off on big decisions. Depression can cloud your judgment. So wait until you feel better before making major life decisions, such as changing jobs, moving, or getting  or .    Be patient. Recovering from depression is a process. Don t be discouraged if it takes some time to feel better.    Keep it simple. Depression saps your energy and concentration.  So you won t be able to do all the things you used to do. Set small goals and do what you can.    Be with others. Don t isolate yourself--you ll only feel worse. Try to be with other people. And take part in fun activities when you can. Go to a movie, ballgame, [employer] service, or social event. Talk openly with people you can trust. And accept help when it s offered.  Take care of your body  People with depression often lose the desire to take care of themselves. That only makes their problems worse. During treatment and afterward, make a point to:    Exercise. It s a great way to take care of your body. And studies have shown that exercise helps fight depression.    Avoid drugs and alcohol. These may ease the pain in the short term. But they ll only make your problems worse in the long run.    Get relief from stress. Ask your healthcare provider for relaxation exercises and techniques to help relieve stress.    Eat right. A balanced and healthy diet helps keep your body healthy.  Date Last Reviewed: 1/1/2017 2000-2017 [employer]. [address] [place] [address]. All rights reserved. This information is not intended as a substitute for professional medical care. Always follow your healthcare professional's instructions.         Treating Anxiety Disorders with Therapy    If you have an anxiety disorder, you don t have to suffer anymore. Treatment is available. Therapy (also called counseling) is often a helpful treatment for anxiety disorders. With therapy, a specially trained professional (therapist) helps you face and learn to manage your anxiety. Therapy can be short-term or long-term depending on your needs. In some cases, medicine may also be prescribed with therapy. It may take time before you notice how much therapy is helping, but stick with it. With therapy, you can feel better.  Cognitive behavioral therapy (CBT)  Cognitive behavioral therapy (CBT) teaches you to manage  anxiety. It does this by helping you understand how you think and act when you re anxious. Research has shown CBT to be a very effective treatment for anxiety disorders. How CBT is run is almost like a class. It involves homework and activities to build skills that teach you to cope with anxiety step by step. It can be done in a group or one-on-one, and often takes place for a set number of sessions. CBT has two main parts:    Cognitive therapy helps you identify the negative, irrational thoughts that occur with your anxiety. You ll learn to replace these with more positive, realistic thoughts.    Behavioral therapy helps you change how you react to anxiety. You ll learn coping skills and methods for relaxing to help you better deal with anxiety.  Other forms of therapy  Other therapy methods may work better for you than CBT. Or, you may move from CBT to another form of therapy as your treatment needs change. This may mean meeting with a therapist by yourself or in a group. Therapy can also help you work through problems in your life, such as drug or alcohol dependence, that may be making your anxiety worse.  Getting better takes time  Therapy will help you feel better and teach you skills to help manage anxiety long term. But change doesn t happen right away. It takes a commitment from you. And treatment only works if you learn to face the causes of your anxiety. So, you might feel worse before you feel better. This can sometimes make it hard to stick with it. But remember: Therapy is a very effective treatment. The results will be well worth it.  Helping yourself  If anxiety is wearing you down, here are some things you can do to cope:    Check with your doctor and rule out any physical problems that may be causing the anxiety symptoms.    If an anxiety disorder is diagnosed, seek mental healthcare. This is an illness and it can respond to treatment. Most types of anxiety disorders will respond to talk therapy and  medicine.    Educate yourself about anxiety disorders. Keep track of helpful online resources and books you can use during stressful periods.    Try stress management techniques such as meditation.    Consider online or in-person support groups.    Don t fight your feelings. Anxiety feeds itself. The more you worry about it, the worse it gets. Instead, try to identify what might have triggered your anxiety. Then try to put this threat in perspective.    Keep in mind that you can t control everything about a situation. Change what you can and let the rest take its course.    Exercise -- it s a great way to relieve tension and help your body feel relaxed.    Examine your life for stress, and try to find ways to reduce it.    Avoid caffeine and nicotine, which can make anxiety symptoms worse.    Fight the temptation to turn to alcohol or unprescribed drugs for relief. They only make things worse in the long run.   Date Last Reviewed: 1/1/2017 2000-2017 [employer]. [address] [place] [address]. All rights reserved. This information is not intended as a substitute for professional medical care. Always follow your healthcare professional's instructions.        Neck pain:   Encouraged to take ibuprofen for relief up to 4 times per day.  Encouraged rest and elevation.  Encouraged to use ice or heat 15 minutes at a time several times per day to decrease pain. Return to clinic in 1-2 weeks as necessary for persistent pain. Return to clinic with any change or worsening of symptoms.   Started on tizanidine muscle relaxor.   Encouraged getting a massage or seeing a chiropractor.       Patient Education     The Benefits of Living Smoke Free  What do you want to gain from quitting? Check off some reasons to quit.  Health benefits  ___  Improve my ability to breathe without coughing or shortness of breath  ___  Reduce my risk of lung cancer, heart disease, chronic lung disease  ___  Have fewer  wrinkles and softer skin  ___  Improve my sense of taste and smell  ___  For pregnant women--reduce the risk of having a miscarriage, stillbirth, premature birth, or low-birth-weight baby  Personal benefits  ___  Feel more in control of my life  ___  Have better-smelling hair, breath, clothes, home, and car  ___  Save time by not having to take smoke breaks, buy cigarettes, or hunt for a light  ___  Have whiter teeth  Family benefits  ___  Reduce my children s respiratory tract infections  ___  Set a good example for my children  ___  Reduce my family s cancer risk  Financial benefits  ___  Save hundreds of dollars each year that would be spent on cigarettes  ___  Save money on medical bills  ___  Save on life, health, and car insurance premiums     Those dollars add up!  Cigarettes are expensive, and getting more expensive all the time. Do you realize how much money you are spending on cigarettes per year? What is the average amount you spend on a pack of cigarettes? What is the average number of packs that you smoke per day? Using your answers to these questions, fill in this formula to help you find out:  ($ _____ per pack) ×  ( _____ number of packs per day) × (365 days) =  $ _____ yearly cost of smoking  Besides tobacco, there are other costs, including extra cleaning bills and replacement costs for clothing and furniture; medical expenses for smoking-related illnesses; and higher health, life, and car insurance premiums.  Cigars and pipes count too!  Cigars and pipes are also dangerous. So are smokeless (chewing) tobacco and snuff. All of these products contain nicotine, a highly addictive substance that has harmful effects on your body. Quitting smoking means giving up all tobacco products.      For more information    https://smokefree.gov/vqzj-xt-oz-expert    National Cancer [place] Smoking Quitline: 877-44U-QUIT (631-285-1909)   Cale last reviewed this educational content on 2/1/2017 2000-2020 The  [employer]. [address] [place] [address]. All rights reserved. This information is not intended as a substitute for professional medical care. Always follow your healthcare professional's instructions.           Patient Education     Why Do You Smoke?  The more you know about why you smoke, the easier it will be to quit. You may reach for a cigarette during a stressful commute. Or you may want to smoke when you first wake up in the morning. Learn what your smoking triggers are, and how to handle them.     Common triggers    Frustration    Extreme tiredness (fatigue)    Anger    Stress    Hunger    Boredom or loneliness    Drinking or socializing    Watching others smoke    Smelling cigarette smoke    Certain daily routines such as driving, or after meals    Track your smoking habits  To learn about your smoking habits, track them for a week. Attach a small notebook or piece of paper to your cigarette pack. With each cigarette you smoke, write down the time, where you are, who you re with, and how you feel.   How to cope with your triggers    Change the habits that lead you to smoke. For instance, if you often smoke at a morning break, go for a walk instead.    Distract yourself from smoking. Keep your hands busy by playing with a paper clip or by doodling. Keep your mouth busy by chewing on gum or a carrot stick.    Limit contact with people who are smoking. When you eat out, sit in the nonsmoking section.    When you first quit, have some fast-acting nicotine replacement products on hand. These include lozenges or gum. Use these during very stressful triggers to reduce cravings.    To learn more    www.cdc.gov/tobacco/quit_smoking/ 800-QUIT-NOW (816-609-1543)    www.smokefree.gov 877-44U-QUIT (678-634-9548)    www.lung.org/stop-smoking/ 800-LUNGUSA (670-991-0447)    [employer] last reviewed this educational content on 5/1/2019 2000-2020 [employer]. [address] [place] [address],  SARAH Harris [address]. All rights reserved. This information is not intended as a substitute for professional medical care. Always follow your healthcare professional's instructions.           Patient Education     How to Quit Smoking  Smoking is a hard habit to break. About 50% of all people who have ever smoked have been able to quit. Most people who still smoke want to quit. Here are some of the best ways to stop smoking.     Keep in mind the health benefits of quitting  The health benefits of quitting start right away. They keep improving the longer you go without smoking. Knowing this can help inspire you to stay on track. These benefits occur at any age. If you are 17 or 70, quitting is a good choice. Some of the health benefits after your last cigarette include:     After 20 minutes: Your blood pressure and pulse return to normal.    After 8 hours: Your oxygen levels return to normal.    After 2 days: Your ability to smell and taste start to improve as damaged nerves regrow.    After 2 to 3 weeks: Your circulation and lung function improve.    After 1 to 9 months: Your coughing, congestion, and shortness of breath decrease. Your tiredness decreases.    After 1 year: Your risk of heart attack decreases by 50%.    After 5 years: Your risk of lung cancer decreases by 50%. Your risk of stroke becomes the same as a nonsmoker s.  Go cold turkey  Most former smokers quit cold turkey. This means stopping all at once. Trying to cut back slowly often doesn't work as well. This may be because it continues the habit of smoking. Also you may inhale more smoke while smoking fewer cigarettes. This leads to the same amount of nicotine in your body.   Get support  Support programs can be a big help, especially for heavy smokers. These groups offer lectures, ways to change behavior, and peer support. Here are some ways to find a support program:     Free national quitline 800-QUIT-NOW (224-715-6615)    [facility] quit-smoking  programs    American Lung Association 614-333-5573    American Cancer Society 069-165-9822  Support at home is important too. Family and friends can offer praise and reassurance. If the smoker in your life finds it hard to quit, encourage them to keep trying.   Try over-the-counter medicine  Nicotine replacement therapy may make it easier to quit. Some aids are available without a prescription. These include a nicotine patch, gum, and lozenges. But it's best to use these under the care of your healthcare provider. The skin patch gives a steady supply of nicotine. Nicotine gum and lozenges give short-time doses of low levels of nicotine. Both methods reduce the craving for cigarettes. If you have nausea, vomiting, dizziness, weakness, or a fast heartbeat, stop using these products. See your provider.   Ask about prescription medicine  After reviewing your smoking patterns and past attempts to quit, your healthcare provider may offer a prescription medicine such as bupropion, varenicline, a nicotine inhaler, or nasal spray. Each has advantages and side effects. Your provider can review these with you.   Keep trying  Most smokers make many attempts at quitting before they succeed. It s important not to give up.   To learn more  For more on how to quit smoking, try these resources:     www.cdc.gov/tobacco/quit_smoking/ 800-QUIT-NOW (934-592-3183)    www.smokefree.gov 877-44U-QUIT (083-662-9569)    www.lung.org/stop-smoking/ 800-LUNGUSA (866-221-9636)  Cale last reviewed this educational content on 12/1/2019 2000-2020 [employer].. [address] [place] [address], [place] [address]. All rights reserved. This information is not intended as a substitute for professional medical care. Always follow your healthcare professional's instructions.           Patient Education     Coping with Smoking Withdrawal    For the first few days after you quit smoking, you may feel cranky, restless, depressed, or low on energy.  These are symptoms of withdrawal. Your body needs time to recover from smoking. Your symptoms should lessen within a few days.   Coping with the urge to smoke    Deep-breathe. Breathe in through your nose. Count to 5. Slowly breathe out through your mouth.    Drink water. Try to drink 8 or more 8-ounce glasses of water a day.    Keep your hands busy. Wash your car. Draw. Do a puzzle. Build a NEMOPTIC.    Delay. The urge to smoke lasts only 3 to 5 minutes.    Keep your mouth busy. Try chewing on fruits or vegetables such as celery, carrots, or apples. Chew sugarless gum or suck on sugar-free hard candy.    Get support  Individual, group, and phone counseling can help keep you on track. Ask your healthcare provider for more information about resources available to you.   Control stress  After you quit, you may feel irritable and stressed. Try taking a warm bath or shower. Listen to music. Go for a walk or to the gym. Try yoga or meditate. Call friends or talk with a professional.   Exercise  Exercise helps your body and mind feel better. There are many ways to be more active. Find something you enjoy doing. See if a friend will join you for a walk or a bike ride.   Sleep better  You may feel tired but have trouble falling asleep. Try to relax before bed. Do a few stretching exercises. Read for a while. Also don't have caffeine for at least a few hours before bedtime.   Get fit, not fat  You may notice an increased appetite. Many people who quit smoking gain a few pounds. To limit weight gain, try to watch what you eat. Cut back on fat in your diet. Snack on low-calorie foods such as fresh fruits and vegetables. Drink low-calorie liquids, especially water. Regular exercise can also help you stay fit. And remember: Your main goal is to be a nonsmoker. Stay focused on that goal.   Quit-smoking products  There are many products that can help you quit smoking. These include medicines and nicotine replacement products.  They are available over-the-counter or by prescription. Ask your healthcare provider if any of these could help you quit smoking.   For more information    Smokefree.gov    National Cancer [place] Smoking Quitline: 877-44U-QUIT (136-849-2709)    Cale last reviewed this educational content on 4/1/2019 2000-2020 The [employer]. [address] [place] [address]. All rights reserved. This information is not intended as a substitute for professional medical care. Always follow your healthcare professional's instructions.           Patient Education     Getting Support for Quitting Smoking  You don t have to go through the process of quitting smoking without support. Tell people you are quitting. The support of friends, coworkers, and family members can make a big difference. Face-to-face or telephone counseling can also be helpful, as can a stop-smoking class or an ex-smokers  group.     Set a quit date  If you re serious about quitting smoking, choose a date within the next 2 to 4 weeks. [place] it in bright, bold letters on a calendar you use often. Tell people about your quit date. Ask for their support. Let your friends and family know how they can help you quit.   Make a contract  A quit-smoking contract gives you a goal. Write out the contract and sign it. Have it witnessed, if you like. Then keep the contract where you ll see it often, or carry it with you. Read the contract when you re tempted to smoke.   Take action  On the day you quit, reread your quit contract. Think about the benefits you gain by quitting, such as better health and an improved sense of taste, as well as the money you will save from not smoking. Also:     Remove cigarettes from your home, car, or any other place where you stash them.    Throw away all smoking materials, including matches, lighters, and ashtrays.    Review your list of triggers and your plan for coping with them.    Stay away from people or settings  you link with smoking.    Make a quit kit that includes gum, mints, carrot sticks, and things to keep your hands and mouth busy.    Talk to your healthcare provider about using quit-smoking products, such as medicine or a nicotine patch, inhaler, nasal spray, gum, or lozenges.  Ask for help  Sometimes you may just need to talk when you miss smoking. Ex-smokers are good to talk to, because they re likely to know how you feel. You may need extra support in the first few weeks after you quit. Ask a friend to call you each day to see how you re doing. Telephone counseling can also help you keep on track. Ask your healthcare provider, local hospital, or public health department to put you in touch with a phone counselor. You may also have to deal with doubters when you decide to quit. Explain to any doubters why you are quitting. Tell them that quitting is important to you. Ask for their support. Tell your smoking buddies that you can walk together instead of smoking together. If someone thinks you won t succeed, say that you have a good quit plan and ask for their support. Let him or her know you re sticking with it.   To learn more  Get more tips from these resources:    www.cdc.gov/tobacco/quit_smoking/ 800-QUIT-NOW (376-933-9809)    www.smokefree.gov 877-44U-QUIT (476-088-7580)    www.lung.org/stop-smoking/ 800-LUNGUSA (729-882-6089)  [employer] last reviewed this educational content on 5/1/2019 2000-2020 [employer]. [address] [place] [address], [place] [address]. All rights reserved. This information is not intended as a substitute for professional medical care. Always follow your healthcare professional's instructions.           Patient Education     Planning to Quit Smoking  Your healthcare provider may have told you that you need to give up tobacco. Only you can decide if and when you are ready to quit. Quitting is hard to do. But the benefits will be worth it. When you decide to quit, come up with a  plan that s right for you. Discuss your plan with your healthcare provider. And talk with your provider about medicines to help you quit.     Line up support  To quit smoking, you ll need a plan and some help. Pick a date in the next 2 to 4 weeks to quit. Use the time between now and that date to arrange for support.     Classes and counselors. Quit-smoking classes  people like you through the process. Get to know others in a class. And support each other beyond the class. Phone counseling also helps you keep on track. Ask your healthcare provider, local hospital, or public health department to put you in touch with a class and a phone counselor.    Family and friends. Tell your family and friends about your quit date. Ask them to support your change. If they smoke, only see them in smoke-free places. Don't allow smoking in your home and car.  Be careful with these products  Finding something to replace cigarettes may be hard to do. Some things may be as harmful as cigarettes. These include:     Smokeless (chewing) tobacco. This is just as harmful as regular tobacco. Tobacco should not be used as a substitute for cigarettes.    Herbal medicines or teas. These may affect how your body handles nicotine. Talk with your healthcare provider before using these products.    E-cigarettes. E-cigarettes are not approved by the FDA as a quit-smoking aid. So far, the research shows there is limited evidence that e-cigarettes are effective for helping smokers quit. They may also have substances that can cause cancer or life-threatening lung conditions. Experts advise not to use these products.  Quit-smoking products  Many products can help you quit smoking. Some are prescription medicines that help curb your cravings and withdrawal symptoms. Other products slowly lessen the level of nicotine your body absorbs. Nicotine is the highly addictive substance found in cigarettes, cigars, and chewing tobacco. Nicotine replacement  products can help get your body used to slowly decreasing amounts of nicotine after you quit smoking. These products include a nicotine patch, gum, lozenge, nasal spray, and inhaler. Always follow the directions for your medicine or product carefully. Your healthcare provider may tell you to start taking the prescription medicine a week before you plan to quit. Don't smoke while you use nicotine products. Doing so can harm your health.   To learn more    www.cdc.gov/tobacco/quit_smoking/ 800-QUIT-NOW (286-291-9417)    www.smokefree.gov 877-44U-QUIT (031-225-3852)    www.lung.org/stop-smoking/ 800-LUNGUSA (999-177-9894)    [employer] last reviewed this educational content on 1/1/2019 2000-2020 [employer] [address] [place] [address], [place] [address]. All rights reserved. This information is not intended as a substitute for professional medical care. Always follow your healthcare professional's instructions.

## 2021-01-08 ASSESSMENT — ANXIETY QUESTIONNAIRES: GAD7 TOTAL SCORE: 21

## 2021-01-14 ENCOUNTER — MYC MEDICAL ADVICE (OUTPATIENT)
Dept: FAMILY MEDICINE | Facility: OTHER | Age: 36
End: 2021-01-14

## 2021-01-14 DIAGNOSIS — R53.83 FATIGUE, UNSPECIFIED TYPE: Primary | ICD-10-CM

## 2021-01-15 ENCOUNTER — TELEPHONE (OUTPATIENT)
Dept: FAMILY MEDICINE | Facility: OTHER | Age: 36
End: 2021-01-15

## 2021-01-15 ENCOUNTER — RESULTS ONLY (OUTPATIENT)
Dept: LAB | Age: 36
End: 2021-01-15

## 2021-01-15 DIAGNOSIS — R53.83 FATIGUE, UNSPECIFIED TYPE: ICD-10-CM

## 2021-01-15 DIAGNOSIS — R53.83 FATIGUE, UNSPECIFIED TYPE: Primary | ICD-10-CM

## 2021-01-15 DIAGNOSIS — R52 BODY ACHES: ICD-10-CM

## 2021-01-15 LAB
ANION GAP SERPL CALCULATED.3IONS-SCNC: 6 MMOL/L (ref 3–14)
BASOPHILS # BLD AUTO: 0 10E9/L (ref 0–0.2)
BASOPHILS NFR BLD AUTO: 0.6 %
BUN SERPL-MCNC: 9 MG/DL (ref 7–25)
CALCIUM SERPL-MCNC: 9.2 MG/DL (ref 8.6–10.3)
CHLORIDE SERPL-SCNC: 107 MMOL/L (ref 98–107)
CO2 SERPL-SCNC: 24 MMOL/L (ref 21–31)
CREAT SERPL-MCNC: 0.73 MG/DL (ref 0.6–1.2)
DEPRECATED CALCIDIOL+CALCIFEROL SERPL-MC: 24.7 NG/ML
DIFFERENTIAL METHOD BLD: NORMAL
EOSINOPHIL # BLD AUTO: 0.3 10E9/L (ref 0–0.7)
EOSINOPHIL NFR BLD AUTO: 3.8 %
ERYTHROCYTE [DISTWIDTH] IN BLOOD BY AUTOMATED COUNT: 12.9 % (ref 10–15)
FERRITIN SERPL-MCNC: 12 NG/ML (ref 23.9–336.2)
GFR SERPL CREATININE-BSD FRML MDRD: >90 ML/MIN/{1.73_M2}
GLUCOSE SERPL-MCNC: 96 MG/DL (ref 70–105)
HCT VFR BLD AUTO: 44.5 % (ref 35–47)
HGB BLD-MCNC: 14.7 G/DL (ref 11.7–15.7)
IMM GRANULOCYTES # BLD: 0 10E9/L (ref 0–0.4)
IMM GRANULOCYTES NFR BLD: 0.3 %
IRON SATN MFR SERPL: 30 % (ref 20–55)
IRON SERPL-MCNC: 121 UG/DL (ref 50–212)
LABORATORY COMMENT REPORT: NORMAL
LYMPHOCYTES # BLD AUTO: 1.8 10E9/L (ref 0.8–5.3)
LYMPHOCYTES NFR BLD AUTO: 26.3 %
MCH RBC QN AUTO: 28.4 PG (ref 26.5–33)
MCHC RBC AUTO-ENTMCNC: 33 G/DL (ref 31.5–36.5)
MCV RBC AUTO: 86 FL (ref 78–100)
MONOCYTES # BLD AUTO: 0.5 10E9/L (ref 0–1.3)
MONOCYTES NFR BLD AUTO: 7.4 %
NEUTROPHILS # BLD AUTO: 4.2 10E9/L (ref 1.6–8.3)
NEUTROPHILS NFR BLD AUTO: 61.6 %
PLATELET # BLD AUTO: 295 10E9/L (ref 150–450)
POTASSIUM SERPL-SCNC: 4 MMOL/L (ref 3.5–5.1)
RBC # BLD AUTO: 5.18 10E12/L (ref 3.8–5.2)
SARS-COV-2 RNA RESP QL NAA+PROBE: NEGATIVE
SODIUM SERPL-SCNC: 137 MMOL/L (ref 134–144)
SPECIMEN SOURCE: NORMAL
TIBC SERPL-MCNC: 400.4 UG/DL (ref 245–400)
TSH SERPL DL<=0.05 MIU/L-ACNC: 0.98 IU/ML (ref 0.34–5.6)
UIBC (UNSATURATED): 279.4 MG/DL
WBC # BLD AUTO: 6.8 10E9/L (ref 4–11)

## 2021-01-15 PROCEDURE — 80048 BASIC METABOLIC PNL TOTAL CA: CPT | Mod: ZL | Performed by: PHYSICIAN ASSISTANT

## 2021-01-15 PROCEDURE — 82728 ASSAY OF FERRITIN: CPT | Mod: ZL | Performed by: PHYSICIAN ASSISTANT

## 2021-01-15 PROCEDURE — 83550 IRON BINDING TEST: CPT | Mod: ZL | Performed by: PHYSICIAN ASSISTANT

## 2021-01-15 PROCEDURE — 84443 ASSAY THYROID STIM HORMONE: CPT | Mod: ZL | Performed by: PHYSICIAN ASSISTANT

## 2021-01-15 PROCEDURE — 83540 ASSAY OF IRON: CPT | Mod: ZL | Performed by: PHYSICIAN ASSISTANT

## 2021-01-15 PROCEDURE — 36415 COLL VENOUS BLD VENIPUNCTURE: CPT | Mod: ZL | Performed by: PHYSICIAN ASSISTANT

## 2021-01-15 PROCEDURE — 85025 COMPLETE CBC W/AUTO DIFF WBC: CPT | Mod: ZL | Performed by: PHYSICIAN ASSISTANT

## 2021-01-15 PROCEDURE — 82306 VITAMIN D 25 HYDROXY: CPT | Mod: ZL | Performed by: PHYSICIAN ASSISTANT

## 2021-01-15 NOTE — TELEPHONE ENCOUNTER
JRO-patient has lab only appt today and is wanting a COVID test as well Please call and advise    Thank You    Gracie Garrett on 1/15/2021 at 8:53 AM

## 2021-01-15 NOTE — TELEPHONE ENCOUNTER
COVID-19 order placed. Please call for a curbside COVID-19 appointment.   Chelle Queen PA-C ..................1/15/2021 9:21 AM

## 2021-01-15 NOTE — TELEPHONE ENCOUNTER
Patient is wondering if she should have a covid test due to her fatigue and body aches, or just stick to the plan of labs first.  Coming in for lab at 11 today.  Analia Coles LPN ...... 1/15/2021 9:02 AM

## 2021-01-19 DIAGNOSIS — R79.0 LOW FERRITIN: Primary | ICD-10-CM

## 2021-01-19 RX ORDER — FERROUS SULFATE 325(65) MG
325 TABLET ORAL
Qty: 90 TABLET | Refills: 0 | Status: SHIPPED | OUTPATIENT
Start: 2021-01-19 | End: 2021-12-21

## 2021-02-09 ENCOUNTER — OFFICE VISIT (OUTPATIENT)
Dept: FAMILY MEDICINE | Facility: OTHER | Age: 36
End: 2021-02-09
Attending: NURSE PRACTITIONER
Payer: COMMERCIAL

## 2021-02-09 VITALS
SYSTOLIC BLOOD PRESSURE: 110 MMHG | HEART RATE: 84 BPM | DIASTOLIC BLOOD PRESSURE: 64 MMHG | HEIGHT: 60 IN | RESPIRATION RATE: 16 BRPM | BODY MASS INDEX: 28.92 KG/M2 | WEIGHT: 147.3 LBS | TEMPERATURE: 97.6 F | OXYGEN SATURATION: 98 %

## 2021-02-09 DIAGNOSIS — L60.0 INGROWN NAIL OF GREAT TOE OF LEFT FOOT: Primary | ICD-10-CM

## 2021-02-09 PROCEDURE — 11730 AVULSION NAIL PLATE SIMPLE 1: CPT | Performed by: NURSE PRACTITIONER

## 2021-02-09 PROCEDURE — 99212 OFFICE O/P EST SF 10 MIN: CPT | Mod: 25 | Performed by: NURSE PRACTITIONER

## 2021-02-09 PROCEDURE — 250N000009 HC RX 250: Performed by: NURSE PRACTITIONER

## 2021-02-09 RX ORDER — LIDOCAINE HYDROCHLORIDE 10 MG/ML
5 INJECTION, SOLUTION EPIDURAL; INFILTRATION; INTRACAUDAL; PERINEURAL ONCE
Status: COMPLETED | OUTPATIENT
Start: 2021-02-09 | End: 2021-02-09

## 2021-02-09 RX ADMIN — LIDOCAINE HYDROCHLORIDE 5 ML: 10 INJECTION, SOLUTION EPIDURAL; INFILTRATION; INTRACAUDAL; PERINEURAL at 20:28

## 2021-02-09 ASSESSMENT — MIFFLIN-ST. JEOR: SCORE: 1294.65

## 2021-02-09 ASSESSMENT — PAIN SCALES - GENERAL: PAINLEVEL: NO PAIN (0)

## 2021-02-10 NOTE — PROGRESS NOTES
HPI:    Sofya Brown is a 35 year old female  who presents to [facility] today for left ingrown toenail.    States she has chronic problems with her toenails curling inwards at the ends and dealing with minor ingrown toenails.  States for about the past 3 weeks since she last trimmed her toenails she has been having redness, swelling, and pain along the edge of her left great toenail.    She has been washing with hydrogen peroxide.      Past Medical History:   Diagnosis Date     Dysplasia of cervix uteri     cryotherapy .     Family history of other specified conditions (CODE)     following Pertussis vaccination, last seizure .     Major depressive disorder, single episode     No Comments Provided     Migraine without status migrainosus, not intractable     No Comments Provided     Other specified postprocedural states     at [facility], abnormal pap smears times 2.  Two sexual partners in her left.     Vaginal delivery          Past Surgical History:   Procedure Laterality Date     OTHER SURGICAL HISTORY      2 mo,,HERNIA REPAIR     Social History     Tobacco Use     Smoking status: Current Every Day Smoker     Packs/day: 1.00     Types: Cigarettes     Last attempt to quit: 3/24/2018     Years since quittin.8     Smokeless tobacco: Never Used     Tobacco comment: Quit smoking: is trying, 1-2 a day   Substance Use Topics     Alcohol use: Yes     Alcohol/week: 1.0 standard drinks     Comment: Occ.     Current Outpatient Medications   Medication Sig Dispense Refill     ALPRAZolam (XANAX) 0.25 MG tablet Take 1 tablet (0.25 mg) by mouth 2 times daily as needed for anxiety 90 tablet 1     buPROPion (WELLBUTRIN XL) 150 MG 24 hr tablet Take 1 tablet (150 mg) by mouth every morning 30 tablet 2     busPIRone (BUSPAR) 5 MG tablet Take 1 tab PO Qam x 3 days, then increase to 1 tab PO BID. Can increase to 2 tabs PO BID in 2-3 weeks if needed 60 tablet 3     cholecalciferol (VITAMIN D3)  5000 units (125 mcg) capsule Take 5,000 Units by mouth daily       ferrous sulfate (FEROSUL) 325 (65 Fe) MG tablet Take 1 tablet (325 mg) by mouth daily (with breakfast) 90 tablet 0     Magnesium Oxide 500 MG TABS Take 1 tablet by mouth daily       tiZANidine (ZANAFLEX) 4 MG tablet Take 0.5-1 tablets (2-4 mg) by mouth 3 times daily as needed for muscle spasms 30 tablet 2     nicotine (NICODERM CQ) 21 MG/24HR 24 hr patch Place 1 patch onto the skin every 24 hours (Patient not taking: Reported on 2/9/2021) 42 patch 0     Allergies   Allergen Reactions     Sertraline      Other reaction(s): Dizziness         Past medical history, past surgical history, current medications and allergies reviewed and accurate to the best of my knowledge.        ROS:  Refer to HPI    /64   Pulse 84   Temp 97.6  F (36.4  C) (Tympanic)   Resp 16   Ht 1.524 m (5')   Wt 66.8 kg (147 lb 4.8 oz)   SpO2 98%   BMI 28.77 kg/m      EXAM:  General Appearance: Well appearing adult female, appropriate appearance for age. No acute distress  Respiratory: normal chest wall and respirations.  Normal effort. No cough appreciated.  Cardiovascular:  CMS intact to left lower extremity, brisk capillary refill, strong pedal pulse, no lower extremity edema  Musculoskeletal:  Equal movement of bilateral upper extremities.  Equal movement of bilateral lower extremities.  Normal gait.   Dermatological:  Left great toe with erythema and swelling along the lateral side of the toenail with purulent drainage expressed.    Psychological: normal affect, alert, oriented, and pleasant.     Procedure note:  Verbal consent obtained  Risks and benefits discussed.  A toe tourniquet was applied - size medium  Chloraprep used to cleanse the left great toenail.  Total of 2 cc 1% Lidocaine without epi was used to numb the surrounding area. An toenail  was used to lift and loosen the lateral side of the toenail.  A toenail scissor was used to cut a small linear  wedge of the toenail and a clamp was used to pull and remove the toenail wedge without complication.  Toe tourniquet was removed  Patient tolerated the procedure well. No complications were appreciated.  Antibiotic ointment was applied and toe was bandaged with guaze.        ASSESSMENT/PLAN:    I have reviewed the nursing notes.  I have reviewed the findings, diagnosis, plan and need for follow up with the patient.    1. Ingrown nail of great toe of left foot    - REMOVAL OF NAIL PLATE SIMPLE SINGLE    See above procedure note for details    Home wound care:  Soak left great toe in warm water with epsom salt BID  Apply antibiotic ointment and bandage after soaking    May use over-the-counter Tylenol or ibuprofen PRN    Monitor for signs of infection    Follow up if symptoms persist or worsen or concerns      I explained my diagnostic considerations and recommendations to the patient, who voiced understanding and agreement with the treatment plan. All questions were answered. We discussed potential side effects of any prescribed or recommended therapies, as well as expectations for response to treatments.    Disclaimer:  This note consists of words and symbols derived from keyboarding, dictation, or using voice recognition software. As a result, there may be errors in the script that have gone undetected. Please consider this when interpreting information found in this note.

## 2021-02-10 NOTE — NURSING NOTE
Chief Complaint   Patient presents with     Ingrown Toenail     Patient is here for an ingrown toenail. Patient states it is red and swollen on one side.     Initial /64   Pulse 84   Temp 97.6  F (36.4  C) (Tympanic)   Resp 16   Ht 1.524 m (5')   Wt 66.8 kg (147 lb 4.8 oz)   SpO2 98%   BMI 28.77 kg/m   Estimated body mass index is 28.77 kg/m  as calculated from the following:    Height as of this encounter: 1.524 m (5').    Weight as of this encounter: 66.8 kg (147 lb 4.8 oz).  Medication Reconciliation: complete    Emilie Saavedra LPN

## 2021-02-17 NOTE — NURSING NOTE
Patient here for prenatal care. She states she has been having some contractions. States baby is moving around. Pt would like to discuss depression medication for after delivery, as she has struggled  With depression and would like to be proactive.     Medication Reconciliation: anthony Barraza LPN  8/11/2020 2:56 PM   Ear Star Wedge Flap Text: The defect edges were debeveled with a #15 blade scalpel.  Given the location of the defect and the proximity to free margins (helical rim) an ear star wedge flap was deemed most appropriate.  Using a sterile surgical marker, the appropriate flap was drawn incorporating the defect and placing the expected incisions between the helical rim and antihelix where possible.  The area thus outlined was incised through and through with a #15 scalpel blade.

## 2021-02-25 ENCOUNTER — VIRTUAL VISIT (OUTPATIENT)
Dept: FAMILY MEDICINE | Facility: OTHER | Age: 36
End: 2021-02-25
Attending: PHYSICIAN ASSISTANT
Payer: COMMERCIAL

## 2021-02-25 ENCOUNTER — TELEPHONE (OUTPATIENT)
Dept: FAMILY MEDICINE | Facility: OTHER | Age: 36
End: 2021-02-25

## 2021-02-25 DIAGNOSIS — F41.1 ANXIETY STATE: ICD-10-CM

## 2021-02-25 PROCEDURE — 99212 OFFICE O/P EST SF 10 MIN: CPT | Mod: TEL | Performed by: PHYSICIAN ASSISTANT

## 2021-02-25 RX ORDER — ALPRAZOLAM 0.25 MG
0.25 TABLET ORAL 2 TIMES DAILY PRN
Qty: 30 TABLET | Refills: 0 | Status: SHIPPED | OUTPATIENT
Start: 2021-02-25 | End: 2022-09-20

## 2021-02-25 ASSESSMENT — ANXIETY QUESTIONNAIRES
GAD7 TOTAL SCORE: 19
2. NOT BEING ABLE TO STOP OR CONTROL WORRYING: NEARLY EVERY DAY
7. FEELING AFRAID AS IF SOMETHING AWFUL MIGHT HAPPEN: NEARLY EVERY DAY
3. WORRYING TOO MUCH ABOUT DIFFERENT THINGS: NEARLY EVERY DAY
5. BEING SO RESTLESS THAT IT IS HARD TO SIT STILL: MORE THAN HALF THE DAYS
1. FEELING NERVOUS, ANXIOUS, OR ON EDGE: NEARLY EVERY DAY
IF YOU CHECKED OFF ANY PROBLEMS ON THIS QUESTIONNAIRE, HOW DIFFICULT HAVE THESE PROBLEMS MADE IT FOR YOU TO DO YOUR WORK, TAKE CARE OF THINGS AT HOME, OR GET ALONG WITH OTHER PEOPLE: SOMEWHAT DIFFICULT
6. BECOMING EASILY ANNOYED OR IRRITABLE: NEARLY EVERY DAY

## 2021-02-25 ASSESSMENT — PATIENT HEALTH QUESTIONNAIRE - PHQ9
SUM OF ALL RESPONSES TO PHQ QUESTIONS 1-9: 11
5. POOR APPETITE OR OVEREATING: MORE THAN HALF THE DAYS

## 2021-02-25 NOTE — TELEPHONE ENCOUNTER
S-(situation): patient calling and states the last 2 days has had severe anxiety and illuminating thoughts    B-(background): patient states was started on Buspar and just increased to 1 tablet twice a day one in AM and PM on Monday    A-(assessment): patient states having constant severe anxiety and illuminating thoughts the last two days.  Patient denies any thoughts of hurting self or others.  States did take a muscle relaxer and this has helped her calm down.  Patient wondering if this is from the Buspar or is something else going on.  Patient states is doing fine at this time but will call back if things change in the meantime , while waiting for response    R-(recommendations): informed patient will route message to Chelle Queen for review and consideration.    Ashley Morfin RN on 2/25/2021 at 10:03 AM

## 2021-02-25 NOTE — TELEPHONE ENCOUNTER
I would recommend stopping the buspirone as it sounds like she has having a reaction from the medication.  I would recommend not taking the buspirone in the future.  I will put it on her allergy list.  Please come in to clinic or the emergency room today or tomorrow if symptoms are not calming down or worsening if needed.  If she does not want to go to the emergency room please help her set up a clinic appointment or telephone appointment today or tomorrow.  Chelle Queen PA-C.......... 2/25/2021 10:41 AM

## 2021-02-25 NOTE — PATIENT INSTRUCTIONS
Stopped wellbutrin and busipirone.    Started on xanax - use sparingling.  Encouraged good diet and exercise.   Encouraged to see a counselor.   Return in 1 week for recheck.   Return to clinic with change/worsening of symptoms.       Suicide Emergency First call for help:  [phone]    Depression: Tips to Help Yourself  As your healthcare providers help treat your depression, you can also help yourself. Keep in mind that your illness affects you emotionally, physically, mentally, and socially. So full recovery will take time. Take care of your body and your soul, and be patient with yourself as you get better.    Self-care    Educate yourself. Read about treatment and medicine options. If you have the energy, attend local conferences or support groups. Keep a list of useful websites and helpful books and use them as needed. This illness is not your fault. Don t blame yourself for your depression.    Manage early symptoms. If you notice symptoms returning, experience triggers, or identify other factors that may lead to a depressive episode, get help as soon as possible. Ask trusted friends and family to monitor your behavior and let you know if they see anything of concern.    Work with your provider. Find a provider you can trust. Communicate honestly with that person and share information on your treatment for depression and your reaction to medicines.    Be prepared for a crisis. Know what to do if you experience a crisis. Keep the phone number of a crisis hotline and know the location of your community's urgent care centers and the closest emergency department.    Hold off on big decisions. Depression can cloud your judgment. So wait until you feel better before making major life decisions, such as changing jobs, moving, or getting  or .    Be patient. Recovering from depression is a process. Don t be discouraged if it takes some time to feel better.    Keep it simple.  Depression saps your energy and concentration. So you won t be able to do all the things you used to do. Set small goals and do what you can.    Be with others. Don t isolate yourself--you ll only feel worse. Try to be with other people. And take part in fun activities when you can. Go to a movie, ballgame, Church service, or social event. Talk openly with people you can trust. And accept help when it s offered.  Take care of your body  People with depression often lose the desire to take care of themselves. That only makes their problems worse. During treatment and afterward, make a point to:    Exercise. It s a great way to take care of your body. And studies have shown that exercise helps fight depression.    Avoid drugs and alcohol. These may ease the pain in the short term. But they ll only make your problems worse in the long run.    Get relief from stress. Ask your healthcare provider for relaxation exercises and techniques to help relieve stress.    Eat right. A balanced and healthy diet helps keep your body healthy.  Date Last Reviewed: 1/1/2017 2000-2017 [employer]. [address] [place] [address]. All rights reserved. This information is not intended as a substitute for professional medical care. Always follow your healthcare professional's instructions.         Treating Anxiety Disorders with Therapy    If you have an anxiety disorder, you don t have to suffer anymore. Treatment is available. Therapy (also called counseling) is often a helpful treatment for anxiety disorders. With therapy, a specially trained professional (therapist) helps you face and learn to manage your anxiety. Therapy can be short-term or long-term depending on your needs. In some cases, medicine may also be prescribed with therapy. It may take time before you notice how much therapy is helping, but stick with it. With therapy, you can feel better.  Cognitive behavioral therapy (CBT)  Cognitive  behavioral therapy (CBT) teaches you to manage anxiety. It does this by helping you understand how you think and act when you re anxious. Research has shown CBT to be a very effective treatment for anxiety disorders. How CBT is run is almost like a class. It involves homework and activities to build skills that teach you to cope with anxiety step by step. It can be done in a group or one-on-one, and often takes place for a set number of sessions. CBT has two main parts:    Cognitive therapy helps you identify the negative, irrational thoughts that occur with your anxiety. You ll learn to replace these with more positive, realistic thoughts.    Behavioral therapy helps you change how you react to anxiety. You ll learn coping skills and methods for relaxing to help you better deal with anxiety.  Other forms of therapy  Other therapy methods may work better for you than CBT. Or, you may move from CBT to another form of therapy as your treatment needs change. This may mean meeting with a therapist by yourself or in a group. Therapy can also help you work through problems in your life, such as drug or alcohol dependence, that may be making your anxiety worse.  Getting better takes time  Therapy will help you feel better and teach you skills to help manage anxiety long term. But change doesn t happen right away. It takes a commitment from you. And treatment only works if you learn to face the causes of your anxiety. So, you might feel worse before you feel better. This can sometimes make it hard to stick with it. But remember: Therapy is a very effective treatment. The results will be well worth it.  Helping yourself  If anxiety is wearing you down, here are some things you can do to cope:    Check with your doctor and rule out any physical problems that may be causing the anxiety symptoms.    If an anxiety disorder is diagnosed, seek mental healthcare. This is an illness and it can respond to treatment. Most types of  anxiety disorders will respond to talk therapy and medicine.    Educate yourself about anxiety disorders. Keep track of helpful online resources and books you can use during stressful periods.    Try stress management techniques such as meditation.    Consider online or in-person support groups.    Don t fight your feelings. Anxiety feeds itself. The more you worry about it, the worse it gets. Instead, try to identify what might have triggered your anxiety. Then try to put this threat in perspective.    Keep in mind that you can t control everything about a situation. Change what you can and let the rest take its course.    Exercise -- it s a great way to relieve tension and help your body feel relaxed.    Examine your life for stress, and try to find ways to reduce it.    Avoid caffeine and nicotine, which can make anxiety symptoms worse.    Fight the temptation to turn to alcohol or unprescribed drugs for relief. They only make things worse in the long run.   Date Last Reviewed: 1/1/2017 2000-2017 [employer]. [address] [place] [address], [place] [address]. All rights reserved. This information is not intended as a substitute for professional medical care. Always follow your healthcare professional's instructions.

## 2021-02-25 NOTE — TELEPHONE ENCOUNTER
Patient notified and telephone appointment made for today.  Analia Coles LPN ...... 2/25/2021 10:46 AM

## 2021-02-25 NOTE — PROGRESS NOTES
Sofya is a 35 year old who is being evaluated via a billable telephone visit.      What phone number would you like to be contacted at? [phone]  How would you like to obtain your AVS? [facility]    Assessment & Plan   Problem List Items Addressed This Visit        Other    Anxiety state    Relevant Medications    ALPRAZolam (XANAX) 0.25 MG tablet         Discussed symptoms at length.  Stopped wellbutrin and busipirone.  Encourage close monitoring over the next few days.  We will recheck her closely in the next week for symptom monitoring.  Gave warning signs and symptoms.    Started on xanax 0.25 mg quantity 30 with 0 refills - use sparingling. Gave side effect profile.   Encouraged good diet and exercise.   Gave warning signs and symptoms.  Encourage close monitoring.  Return to clinic emergency room if symptoms are changing or worsening.  Encouraged to see a counselor.   Return in 1 week for recheck.   Return to clinic with change/worsening of symptoms.     PDMP Review       Value Time User    State PDMP site checked  Yes 3/2/2021 12:38 PM Chelle Queen PA-C            Depression Screening Follow Up    PHQ 2/25/2021   PHQ-9 Total Score 11   Q9: Thoughts of better off dead/self-harm past 2 weeks Several days   F/U: Thoughts of suicide or self-harm -   F/U: Self harm-plan -   F/U: Self-harm action -   F/U: Safety concerns -     Last PHQ-9 2/25/2021   1.  Little interest or pleasure in doing things 1   2.  Feeling down, depressed, or hopeless 1   3.  Trouble falling or staying asleep, or sleeping too much 0   4.  Feeling tired or having little energy 2   5.  Poor appetite or overeating 3   6.  Feeling bad about yourself 2   7.  Trouble concentrating 1   8.  Moving slowly or restless 0   Q9: Thoughts of better off dead/self-harm past 2 weeks 1   PHQ-9 Total Score 11   Difficulty at work, home, or with people Somewhat difficult   In the past two weeks have you had thoughts of suicide or self harm? -   Do you  have concerns about your personal safety or the safety of others? -   In the past 2 weeks have you thought about a plan or had intention to harm yourself? -   In the past 2 weeks have you acted on these thoughts in any way? -     JANINE-7 SCORE 1/30/2020 1/7/2021 2/25/2021   Total Score 13 21 19           Return in about 1 week (around 3/4/2021) for Recheck.    Chelle Queen PA-C  [facility] AND [facility]   Sofya is a 35 year old who presents for the following health issues     HPI     Patient is concerned about increasing anxiety.  Patient switched to Wellbutrin.  Currently take 1 tablet daily for 1 week.  She then increase to 1 tablet twice daily over the last 3 days.  She noticed increased anxiety yesterday.  She is also taking buspirone.  Unsure if this is causing anxiety.  Has increased fatigue.  Feels a lot worse today.  She is anxious, feels panicky, and has ruminating thoughts.  Has history of anxiety in the past.  Feels down and depressed.  Had increased appetite today.  History of Xanax in the past.  Tolerated this medication well.  Currently misplaced her bottle.  Wondering about getting a refill.  Has taken Wellbutrin over the last 2 weeks.  Patient had extreme fatigue a month ago when she was on Wellbutrin, buspirone, and tizanidine.  She ended up taking it for 3 days.  Felt better going off the medication.  She has had suicidal thoughts in the past.  No current suicidal or homicidal ideation.  Patient feels safe.  Does not feel like she is going to hurt herself or others.    Review of Systems   Constitutional, HEENT, cardiovascular, pulmonary, gi and gu systems are negative, except as otherwise noted.      Objective           Vitals:  No vitals were obtained today due to virtual visit.    Physical Exam   healthy, alert and no distress  PSYCH: Alert and oriented times 3; coherent speech, normal   rate and volume, able to articulate logical thoughts, able   to abstract reason, no  tangential thoughts, no hallucinations   or delusions  Her affect is normal. No SI or HI.   RESP: No cough, no audible wheezing, able to talk in full sentences  Remainder of exam unable to be completed due to telephone visits        Phone call duration: 12 minutes

## 2021-02-26 ASSESSMENT — ANXIETY QUESTIONNAIRES: GAD7 TOTAL SCORE: 19

## 2021-03-02 ENCOUNTER — VIRTUAL VISIT (OUTPATIENT)
Dept: FAMILY MEDICINE | Facility: OTHER | Age: 36
End: 2021-03-02
Attending: PHYSICIAN ASSISTANT
Payer: COMMERCIAL

## 2021-03-02 DIAGNOSIS — Z53.9 NO SHOW: Primary | ICD-10-CM

## 2021-03-20 DIAGNOSIS — M54.2 CERVICALGIA: ICD-10-CM

## 2021-03-22 NOTE — TELEPHONE ENCOUNTER
[facility]  sent Rx request for the following:     tiZANidine (ZANAFLEX) 4 MG tablet  Sig TAKE 1/2 TO 1 TABLET(2 TO 4 MG) BY MOUTH THREE TIMES DAILY AS NEEDED FOR MUSCLE SPASMS  Last Prescription Date:   2/24/2021  Last Fill Qty/Refills:         30, R-2    Last Office Visit:              2/25/2021  Future Office visit:           none    Routing refill request to provider for review/approval because:  Drug not on the [facility], [facility] [[email or web address]] or [facility] refill protocol or controlled substance        There is no refill protocol information for this order        Medication not on RN medication refillpolicy.  Unable to complete prescription refill per RN Medication Refill Policy.................... Destiny Hutson RN ....................  3/22/2021   4:23 PM

## 2021-04-12 ENCOUNTER — MYC MEDICAL ADVICE (OUTPATIENT)
Dept: FAMILY MEDICINE | Facility: OTHER | Age: 36
End: 2021-04-12

## 2021-04-12 DIAGNOSIS — Z72.0 TOBACCO ABUSE: ICD-10-CM

## 2021-04-12 RX ORDER — NICOTINE 21 MG/24HR
1 PATCH, TRANSDERMAL 24 HOURS TRANSDERMAL EVERY 24 HOURS
Qty: 30 PATCH | Refills: 1 | Status: SHIPPED | OUTPATIENT
Start: 2021-04-12 | End: 2021-12-21

## 2021-04-12 RX ORDER — NICOTINE 21 MG/24HR
1 PATCH, TRANSDERMAL 24 HOURS TRANSDERMAL EVERY 24 HOURS
Qty: 60 PATCH | Refills: 0 | Status: SHIPPED | OUTPATIENT
Start: 2021-04-12 | End: 2021-12-21

## 2021-04-25 ENCOUNTER — HEALTH MAINTENANCE LETTER (OUTPATIENT)
Age: 36
End: 2021-04-25

## 2021-06-25 ENCOUNTER — MYC MEDICAL ADVICE (OUTPATIENT)
Dept: FAMILY MEDICINE | Facility: OTHER | Age: 36
End: 2021-06-25

## 2021-07-07 ENCOUNTER — VIRTUAL VISIT (OUTPATIENT)
Dept: FAMILY MEDICINE | Facility: OTHER | Age: 36
End: 2021-07-07
Attending: FAMILY MEDICINE
Payer: COMMERCIAL

## 2021-07-07 VITALS — BODY MASS INDEX: 28.77 KG/M2 | HEIGHT: 60 IN | TEMPERATURE: 98 F

## 2021-07-07 DIAGNOSIS — R79.89 LOW VITAMIN D LEVEL: ICD-10-CM

## 2021-07-07 DIAGNOSIS — F41.1 GAD (GENERALIZED ANXIETY DISORDER): Primary | ICD-10-CM

## 2021-07-07 DIAGNOSIS — R79.0 LOW FERRITIN LEVEL: ICD-10-CM

## 2021-07-07 PROCEDURE — 99214 OFFICE O/P EST MOD 30 MIN: CPT | Mod: 95 | Performed by: FAMILY MEDICINE

## 2021-07-07 RX ORDER — BUPROPION HYDROCHLORIDE 150 MG/1
TABLET ORAL
COMMUNITY
Start: 2021-03-08 | End: 2021-12-21

## 2021-07-07 RX ORDER — VENLAFAXINE HYDROCHLORIDE 37.5 MG/1
37.5 CAPSULE, EXTENDED RELEASE ORAL DAILY
Qty: 30 CAPSULE | Refills: 0 | Status: SHIPPED | OUTPATIENT
Start: 2021-07-07 | End: 2021-08-10

## 2021-07-07 ASSESSMENT — ANXIETY QUESTIONNAIRES
2. NOT BEING ABLE TO STOP OR CONTROL WORRYING: NEARLY EVERY DAY
7. FEELING AFRAID AS IF SOMETHING AWFUL MIGHT HAPPEN: NEARLY EVERY DAY
6. BECOMING EASILY ANNOYED OR IRRITABLE: NEARLY EVERY DAY
3. WORRYING TOO MUCH ABOUT DIFFERENT THINGS: NEARLY EVERY DAY
GAD7 TOTAL SCORE: 18
1. FEELING NERVOUS, ANXIOUS, OR ON EDGE: NEARLY EVERY DAY
IF YOU CHECKED OFF ANY PROBLEMS ON THIS QUESTIONNAIRE, HOW DIFFICULT HAVE THESE PROBLEMS MADE IT FOR YOU TO DO YOUR WORK, TAKE CARE OF THINGS AT HOME, OR GET ALONG WITH OTHER PEOPLE: SOMEWHAT DIFFICULT
5. BEING SO RESTLESS THAT IT IS HARD TO SIT STILL: NOT AT ALL

## 2021-07-07 ASSESSMENT — PATIENT HEALTH QUESTIONNAIRE - PHQ9
SUM OF ALL RESPONSES TO PHQ QUESTIONS 1-9: 6
5. POOR APPETITE OR OVEREATING: NEARLY EVERY DAY

## 2021-07-07 ASSESSMENT — PAIN SCALES - GENERAL: PAINLEVEL: NO PAIN (0)

## 2021-07-07 NOTE — NURSING NOTE
Chief Complaint   Patient presents with     Anxiety       Medication Reconciliation: complete    Norma Fall LPN    FOOD SECURITY SCREENING QUESTIONS  Hunger Vital Signs:  Within the past 12 months we worried whether our food would run out before we got money to buy more. Never  Within the past 12 months the food we bought just didn't last and we didn't have money to get more. Never  Norma Fall LPN 7/7/2021 4:04 PM    Patient states having vertigo with her anxiety. She is also wondering about her thyroid. She would also like to discuss insomnia. Patient is also having muscle tenderness.Patient states she is also having hair loss.     Norma Fall LPN

## 2021-07-07 NOTE — PROGRESS NOTES
How would you like to obtain your AVS? MyChart  Will anyone else be joining your video visit? No    Assessment and Plan    1. JANINE (generalized anxiety disorder)  Comment: MDD is not as bothersome as last visit, but her anxiety symptoms remain severe and bothersome.  Wellbutrin and buspirone were not helpful previously.  Plan: Given her symptoms also of irritability and anger, trial of SNRI is appropriate.  -- Venlafaxine (EFFEXOR-XR) 37.5 MG 24 hr capsule; Take 1 capsule (37.5 mg) by mouth daily  Dispense: 30 capsule; Refill: 0  -- She should notice some improvement within 2 to 3 weeks, and then full effect by 6 to 8 weeks.  -- Recheck symptoms in 3 to 4 weeks and increase medication if she is tolerating it well.  Potential side effects discussed.  -- Encouraged her to contact clinic if she notes any suicidal ideation.  -- Discussed continuing Xanax 0.25 mg 1 to 2 tablets daily as needed for ruminating thoughts/panic disorder with the goal of tapering down and discontinuing in the future as SNRI/SSRI has full effect.  -- reviewed and is appropriate  --Vertigo-continue to monitor.  Encouraged patient to contact clinic if her symptoms suddenly worsen.    2. Low ferritin level  - Ferritin; Future    3. Low vitamin D level  - Vitamin D Total; Future    4.  Generalized hair loss  -Recent TSH was normal.  -Could be due to stress, anxiety  -Continue biotin supplementation and reassess in 1 to 2 months    Subjective   Patient presents for the following health issues:    Chief Complaint   Patient presents with     Anxiety     HPI:   Patient was last seen on 3/2/2021 for anxiety and depression.  Wellbutrin and buspirone were stopped at that time due to side effects of extreme fatigue and worsening anxiety.  She was prescribed Xanax 0.25 mg #30.  Patient feels her depression is better since stopping Wellbutrin and buspirone, but her anxiety is overall worse. She has a worse insomnia, ruminating thoughts and occasionally has  disturbing dreams about her children dying.  She also notes an increase in her agitation and anger levels.  She gets easily frustrated with her children.  Initially she was using Ativan very sparingly, but now has increased ruminating thoughts and panicky feelings and is taking 1 tablet daily as needed. She has had suicidal thoughts in the past.  No current suicidal or homicidal ideation.  Patient feels safe.  Does not feel like she is going to hurt herself or others.    Patient also notes mild vertigo starting in September and reports she is feeling this morning more often.  She also notes muscle pain described as tenderness when she is touched or poked, and also generalized hair loss over the last few months.  Patient denies any patches of hair loss.  She has recently started biotin a couple of weeks ago.    For her insomnia she has tried over-the-counter ZzzQuil and melatonin with some relief.    History of low vitamin D and ferritin in the past.  Patient has IUD for contraception.    Review of Systems   Constitutional, HEENT, cardiovascular, pulmonary, GI and  systems are negative, except as otherwise noted.      Objective      Current Outpatient Medications   Medication Instructions     ALPRAZolam (XANAX) 0.25 mg, Oral, 2 TIMES DAILY PRN     buPROPion (WELLBUTRIN XL) 150 MG 24 hr tablet No dose, route, or frequency recorded.     cholecalciferol (VITAMIN D3) 5,000 Units, Oral, DAILY     ferrous sulfate (FEROSUL) 325 mg, Oral, DAILY WITH BREAKFAST     Magnesium Oxide 500 MG TABS 1 tablet, Oral, DAILY     nicotine (NICODERM CQ) 14 MG/24HR 24 hr patch 1 patch, Transdermal, EVERY 24 HOURS     nicotine (NICODERM CQ) 21 MG/24HR 24 hr patch 1 patch, Transdermal, EVERY 24 HOURS     tiZANidine (ZANAFLEX) 4 MG tablet TAKE 1/2 TO 1 TABLET(2 TO 4 MG) BY MOUTH THREE TIMES DAILY AS NEEDED FOR MUSCLE SPASMS     venlafaxine (EFFEXOR-XR) 37.5 mg, Oral, DAILY      Vitals:  No vitals were obtained today due to virtual  visit.    Physical Exam   GENERAL: Healthy, alert and no distress  EYES: Eyes grossly normal to inspection.  No discharge or erythema, or obvious scleral/conjunctival abnormalities.  RESP: No audible wheeze, cough, or visible cyanosis.  No visible retractions or increased work of breathing.    SKIN: Visible skin clear. No significant rash, abnormal pigmentation or lesions.  NEURO: Cranial nerves grossly intact.  Mentation and speech appropriate for age.  PSYCH: Mentation appears normal, affect normal/bright, judgement and insight intact, normal speech and appearance well-groomed.    JANINE-7 SCORE 1/7/2021 2/25/2021 7/7/2021   Total Score 21 19 18       PHQ 1/7/2021 2/25/2021 7/7/2021   PHQ-9 Total Score 11 11 6   Q9: Thoughts of better off dead/self-harm past 2 weeks Not at all Several days Not at all   F/U: Thoughts of suicide or self-harm - - -   F/U: Self harm-plan - - -   F/U: Self-harm action - - -   F/U: Safety concerns - - -       Video-Visit Details    Type of service:  Video Visit    Video Total Time: 16 min  A total of 35 minutes were spent on this encounter, including prep time, visit time with the patient, and post visit work including documentation time.    Originating Location (pt. Location): Home    Distant Location (provider location):  [facility] AND \[facility]\""     Platform used for Video Visit: Selena Sapp MD  Family Practice/OB

## 2021-07-08 ASSESSMENT — ANXIETY QUESTIONNAIRES: GAD7 TOTAL SCORE: 18

## 2021-07-30 ENCOUNTER — LAB (OUTPATIENT)
Dept: LAB | Facility: OTHER | Age: 36
End: 2021-07-30
Attending: FAMILY MEDICINE
Payer: COMMERCIAL

## 2021-07-30 DIAGNOSIS — R79.89 LOW VITAMIN D LEVEL: ICD-10-CM

## 2021-07-30 DIAGNOSIS — R79.0 LOW FERRITIN LEVEL: ICD-10-CM

## 2021-07-30 LAB
DEPRECATED CALCIDIOL+CALCIFEROL SERPL-MC: 48 UG/L (ref 30–100)
FERRITIN SERPL-MCNC: 25 NG/ML (ref 24–336)
HOLD SPECIMEN: NORMAL

## 2021-07-30 PROCEDURE — 36415 COLL VENOUS BLD VENIPUNCTURE: CPT | Mod: ZL

## 2021-07-30 PROCEDURE — 82728 ASSAY OF FERRITIN: CPT | Mod: ZL

## 2021-07-30 PROCEDURE — 82306 VITAMIN D 25 HYDROXY: CPT | Mod: ZL

## 2021-08-09 DIAGNOSIS — F41.1 GAD (GENERALIZED ANXIETY DISORDER): ICD-10-CM

## 2021-08-10 NOTE — TELEPHONE ENCOUNTER
[facility] sent Rx request for the following:      Requested Prescriptions   Pending Prescriptions Disp Refills     venlafaxine (EFFEXOR-XR) 37.5 MG 24 hr capsule [Pharmacy Med Name: VENLAFAXINE ER 37.5MG CAPSULES] 30 capsule 0     Sig: TAKE 1 CAPSULE(37.5 MG) BY MOUTH DAILY       Serotonin-Norepinephrine Reuptake Inhibitors  Passed - 8/10/2021  2:11 PM          Last Prescription Date:   7/7/21  Last Fill Qty/Refills:         30, R-0    Last Office Visit:            7/7/21 (Ruba Cobos)     Future Office visit:           None  Routing refill request to provider for review/approval because:   Routing to provider for review. Janessa Kruse RN on 8/10/2021 at 2:52 PM

## 2021-08-11 RX ORDER — VENLAFAXINE HYDROCHLORIDE 37.5 MG/1
CAPSULE, EXTENDED RELEASE ORAL
Qty: 90 CAPSULE | Refills: 0 | Status: SHIPPED | OUTPATIENT
Start: 2021-08-11 | End: 2021-12-21

## 2021-09-09 ENCOUNTER — IMMUNIZATION (OUTPATIENT)
Dept: FAMILY MEDICINE | Facility: OTHER | Age: 36
End: 2021-09-09
Attending: FAMILY MEDICINE
Payer: COMMERCIAL

## 2021-09-09 PROCEDURE — 91300 PR COVID VAC PFIZER DIL RECON 30 MCG/0.3 ML IM: CPT

## 2021-09-09 PROCEDURE — 0001A PR COVID VAC PFIZER DIL RECON 30 MCG/0.3 ML IM: CPT

## 2021-09-30 ENCOUNTER — IMMUNIZATION (OUTPATIENT)
Dept: FAMILY MEDICINE | Facility: OTHER | Age: 36
End: 2021-09-30
Attending: FAMILY MEDICINE

## 2021-09-30 PROCEDURE — 91300 PR COVID VAC PFIZER DIL RECON 30 MCG/0.3 ML IM: CPT

## 2021-09-30 PROCEDURE — 0002A PR COVID VAC PFIZER DIL RECON 30 MCG/0.3 ML IM: CPT

## 2021-10-09 ENCOUNTER — HEALTH MAINTENANCE LETTER (OUTPATIENT)
Age: 36
End: 2021-10-09

## 2021-11-10 ENCOUNTER — LAB REQUISITION (OUTPATIENT)
Dept: LAB | Facility: OTHER | Age: 36
End: 2021-11-10

## 2021-11-10 DIAGNOSIS — Z11.52 ENCOUNTER FOR SCREENING FOR COVID-19: ICD-10-CM

## 2021-11-10 LAB — SARS-COV-2 RNA RESP QL NAA+PROBE: NEGATIVE

## 2021-11-10 PROCEDURE — U0003 INFECTIOUS AGENT DETECTION BY NUCLEIC ACID (DNA OR RNA); SEVERE ACUTE RESPIRATORY SYNDROME CORONAVIRUS 2 (SARS-COV-2) (CORONAVIRUS DISEASE [COVID-19]), AMPLIFIED PROBE TECHNIQUE, MAKING USE OF HIGH THROUGHPUT TECHNOLOGIES AS DESCRIBED BY CMS-2020-01-R: HCPCS | Performed by: FAMILY MEDICINE

## 2021-11-16 ENCOUNTER — LAB REQUISITION (OUTPATIENT)
Dept: LAB | Facility: OTHER | Age: 36
End: 2021-11-16

## 2021-11-16 ENCOUNTER — OFFICE VISIT (OUTPATIENT)
Dept: FAMILY MEDICINE | Facility: OTHER | Age: 36
End: 2021-11-16
Attending: PHYSICIAN ASSISTANT
Payer: COMMERCIAL

## 2021-11-16 VITALS
TEMPERATURE: 98.3 F | HEART RATE: 80 BPM | OXYGEN SATURATION: 97 % | DIASTOLIC BLOOD PRESSURE: 62 MMHG | SYSTOLIC BLOOD PRESSURE: 114 MMHG | BODY MASS INDEX: 26.17 KG/M2 | RESPIRATION RATE: 16 BRPM | WEIGHT: 134 LBS

## 2021-11-16 DIAGNOSIS — Z11.52 ENCOUNTER FOR SCREENING FOR COVID-19: ICD-10-CM

## 2021-11-16 DIAGNOSIS — J20.9 ACUTE BRONCHITIS, UNSPECIFIED ORGANISM: Primary | ICD-10-CM

## 2021-11-16 LAB — SARS-COV-2 RNA RESP QL NAA+PROBE: NEGATIVE

## 2021-11-16 PROCEDURE — 99213 OFFICE O/P EST LOW 20 MIN: CPT | Performed by: NURSE PRACTITIONER

## 2021-11-16 PROCEDURE — U0003 INFECTIOUS AGENT DETECTION BY NUCLEIC ACID (DNA OR RNA); SEVERE ACUTE RESPIRATORY SYNDROME CORONAVIRUS 2 (SARS-COV-2) (CORONAVIRUS DISEASE [COVID-19]), AMPLIFIED PROBE TECHNIQUE, MAKING USE OF HIGH THROUGHPUT TECHNOLOGIES AS DESCRIBED BY CMS-2020-01-R: HCPCS | Performed by: FAMILY MEDICINE

## 2021-11-16 RX ORDER — ALBUTEROL SULFATE 90 UG/1
2 AEROSOL, METERED RESPIRATORY (INHALATION) EVERY 6 HOURS
Qty: 18 G | Refills: 0 | Status: SHIPPED | OUTPATIENT
Start: 2021-11-16 | End: 2024-02-12

## 2021-11-16 RX ORDER — PREDNISONE 20 MG/1
40 TABLET ORAL DAILY
Qty: 10 TABLET | Refills: 0 | Status: SHIPPED | OUTPATIENT
Start: 2021-11-16 | End: 2021-11-21

## 2021-11-16 ASSESSMENT — PAIN SCALES - GENERAL: PAINLEVEL: MODERATE PAIN (5)

## 2021-11-16 NOTE — NURSING NOTE
Chief Complaint   Patient presents with     Cough     Patient is here for a cough and chest congestion that started about a week ago. Patient has been trying mucinex with no relief. Patient currently declining COVID swab.     Initial /62   Pulse 80   Temp 98.3  F (36.8  C) (Tympanic)   Resp 16   Wt 60.8 kg (134 lb)   SpO2 97%   BMI 26.17 kg/m   Estimated body mass index is 26.17 kg/m  as calculated from the following:    Height as of 7/7/21: 1.524 m (5').    Weight as of this encounter: 60.8 kg (134 lb).  Medication Reconciliation: complete    Emilie Saavedra LPN

## 2021-11-16 NOTE — PATIENT INSTRUCTIONS
Robitussin DM for cough.     Prednisone ordered, wait to use if robitussin and albuterol do not help alleviate cough.     Covid pending.

## 2021-11-16 NOTE — PROGRESS NOTES
ASSESSMENT/PLAN:    I have reviewed the nursing notes.  I have reviewed the findings, diagnosis, plan and need for follow up with the patient.    1. Acute bronchitis, unspecified organism  Covid negative today, tested in house as she is an employee at [employer]. Symptoms are consistent with acute bronchitis, which is generally caused by a viral illness. Opted to treat with albuterol and prednisone due to severity of cough that is preventing sleep for past 2 nights. She is agreeable and accepting of such treatment. Follow up if worsening symptoms or new onset of fever or shortness of breath.   - albuterol (PROAIR HFA/PROVENTIL HFA/VENTOLIN HFA) 108 (90 Base) MCG/ACT inhaler; Inhale 2 puffs into the lungs every 6 hours  Dispense: 18 g; Refill: 0  - predniSONE (DELTASONE) 20 MG tablet; Take 2 tablets (40 mg) by mouth daily for 5 days  Dispense: 10 tablet; Refill: 0  -Symptomatic treatment - Encouraged fluids, salt water gargles, honey (only if greater than 1 year in age due to risk of botulism), elevation, humidifier, sinus rinse/netti pot, lozenges, tea, topical vapor rub, popsicles, rest, etc   -May use over-the-counter Tylenol or ibuprofen PRN    Discussed warning signs/symptoms indicative of need to f/u    Follow up if symptoms persist or worsen or concerns    I explained my diagnostic considerations and recommendations to the patient, who voiced understanding and agreement with the treatment plan. All questions were answered. We discussed potential side effects of any prescribed or recommended therapies, as well as expectations for response to treatments.    Tracy Rodriguez NP  11/16/2021  4:48 PM    HPI:  Sofya Brown is a 36 year old female who presents to [facility] today for concerns of cough and chest congestion that started about 1 week ago. She also has body aches, chills, sore throat, and some ear discomfort, and nasal drainage/post nasal drip. No fevers. No vomiting. Mild nausea.  Has been trying  mucinex with no relief.  Son also has upper respiratory symptoms. Daughter in the household had covid 2 weeks ago. Since then, Sofya has been tested at work and was negative for covid the day of onset of symptoms. She is agreeable to one more test today, due to worsening/persistent symptoms following recent test. She was also exposed to her sister who has similar upper respiratory symptoms and also negative for covid. She is not sleeping well due to the cough. Using cough drops. Has not tried robitussin DM. States coughs all night long, has not slept at all. Worse at night and with lying down.      Past Medical History:   Diagnosis Date     Dysplasia of cervix uteri     cryotherapy .     Family history of other specified conditions (CODE)     following Pertussis vaccination, last seizure .     Major depressive disorder, single episode     No Comments Provided     Migraine without status migrainosus, not intractable     No Comments Provided     Other specified postprocedural states     at [facility], abnormal pap smears times 2.  Two sexual partners in her left.     Vaginal delivery          Past Surgical History:   Procedure Laterality Date     OTHER SURGICAL HISTORY      2 mo,,HERNIA REPAIR     Social History     Tobacco Use     Smoking status: Current Every Day Smoker     Packs/day: 0.20     Types: Cigarettes     Last attempt to quit: 3/24/2018     Years since quitting: 3.6     Smokeless tobacco: Never Used     Tobacco comment: Quit smoking: is trying, 1-2 a day   Substance Use Topics     Alcohol use: Not Currently     Alcohol/week: 1.0 standard drink     Comment: Occ.     Current Outpatient Medications   Medication Sig Dispense Refill     albuterol (PROAIR HFA/PROVENTIL HFA/VENTOLIN HFA) 108 (90 Base) MCG/ACT inhaler Inhale 2 puffs into the lungs every 6 hours 18 g 0     ALPRAZolam (XANAX) 0.25 MG tablet Take 1 tablet (0.25 mg) by mouth 2 times daily as needed for anxiety 30  tablet 0     predniSONE (DELTASONE) 20 MG tablet Take 2 tablets (40 mg) by mouth daily for 5 days 10 tablet 0     buPROPion (WELLBUTRIN XL) 150 MG 24 hr tablet  (Patient not taking: Reported on 11/16/2021)       cholecalciferol (VITAMIN D3) 5000 units (125 mcg) capsule Take 5,000 Units by mouth daily (Patient not taking: Reported on 11/16/2021)       ferrous sulfate (FEROSUL) 325 (65 Fe) MG tablet Take 1 tablet (325 mg) by mouth daily (with breakfast) (Patient not taking: Reported on 11/16/2021) 90 tablet 0     Magnesium Oxide 500 MG TABS Take 1 tablet by mouth daily (Patient not taking: Reported on 11/16/2021)       nicotine (NICODERM CQ) 14 MG/24HR 24 hr patch Place 1 patch onto the skin every 24 hours (Patient not taking: Reported on 7/7/2021) 30 patch 1     nicotine (NICODERM CQ) 21 MG/24HR 24 hr patch Place 1 patch onto the skin every 24 hours (Patient not taking: Reported on 7/7/2021) 60 patch 0     tiZANidine (ZANAFLEX) 4 MG tablet TAKE 1/2 TO 1 TABLET(2 TO 4 MG) BY MOUTH THREE TIMES DAILY AS NEEDED FOR MUSCLE SPASMS (Patient not taking: Reported on 11/16/2021) 30 tablet 2     venlafaxine (EFFEXOR-XR) 37.5 MG 24 hr capsule TAKE 1 CAPSULE(37.5 MG) BY MOUTH DAILY (Patient not taking: Reported on 11/16/2021) 90 capsule 0     Allergies   Allergen Reactions     Sertraline      Other reaction(s): Dizziness     Buspirone Anxiety and Other (See Comments)     Illuminating thoughts (hallucinations)     Wellbutrin [Bupropion] Anxiety     Past medical history, past surgical history, current medications and allergies reviewed and accurate to the best of my knowledge.      ROS:  Refer to HPI    /62   Pulse 80   Temp 98.3  F (36.8  C) (Tympanic)   Resp 16   Wt 60.8 kg (134 lb)   SpO2 97%   BMI 26.17 kg/m      EXAM:  General Appearance: Well appearing 36 year old female, appropriate appearance for age. No acute distress   Ears: Left TM intact, translucent with bony landmarks appreciated, no erythema, no effusion,  no bulging, no purulence.  Right TM intact, translucent with bony landmarks appreciated, no erythema, no effusion, no bulging, no purulence.  Left auditory canal clear.  Right auditory canal clear.  Normal external ears, non tender.  Eyes: conjunctivae normal without erythema or irritation, corneas clear, no drainage or crusting, no eyelid swelling, pupils equal   Orophayrnx: moist mucous membranes, posterior pharynx without erythema, tonsils symmetric, no erythema, no exudates or petechiae, no post nasal drip seen, no trismus, voice clear.    Sinuses:  No sinus tenderness upon palpation of the frontal or maxillary sinuses  Nose:  Bilateral nares: no erythema, no edema, no drainage or congestion   Neck: supple without adenopathy  Respiratory: normal chest wall and respirations.  Normal effort.  Clear to auscultation bilaterally, no wheezing, crackles or rhonchi.  No increased work of breathing.  + nonproductive cough appreciated.  Cardiac: RRR with no murmurs  Musculoskeletal:  Equal movement of bilateral upper extremities.  Equal movement of bilateral lower extremities.  Normal gait.   Dermatological: no rashes noted of exposed skin  Psychological: normal affect, alert, oriented, and pleasant.

## 2021-12-07 ENCOUNTER — OFFICE VISIT (OUTPATIENT)
Dept: FAMILY MEDICINE | Facility: OTHER | Age: 36
End: 2021-12-07
Attending: PHYSICIAN ASSISTANT
Payer: COMMERCIAL

## 2021-12-07 VITALS
SYSTOLIC BLOOD PRESSURE: 102 MMHG | RESPIRATION RATE: 16 BRPM | HEART RATE: 76 BPM | TEMPERATURE: 97.1 F | BODY MASS INDEX: 26.07 KG/M2 | WEIGHT: 133.5 LBS | DIASTOLIC BLOOD PRESSURE: 66 MMHG

## 2021-12-07 DIAGNOSIS — N89.8 VAGINAL DISCHARGE: ICD-10-CM

## 2021-12-07 DIAGNOSIS — N89.8 VAGINAL ITCHING: ICD-10-CM

## 2021-12-07 DIAGNOSIS — N76.0 BACTERIAL VAGINOSIS: Primary | ICD-10-CM

## 2021-12-07 DIAGNOSIS — B96.89 BACTERIAL VAGINOSIS: Primary | ICD-10-CM

## 2021-12-07 LAB
CLUE CELLS: PRESENT
TRICHOMONAS, WET PREP: ABNORMAL
WBC'S/HIGH POWER FIELD, WET PREP: ABNORMAL
YEAST, WET PREP: ABNORMAL

## 2021-12-07 PROCEDURE — 99213 OFFICE O/P EST LOW 20 MIN: CPT | Performed by: NURSE PRACTITIONER

## 2021-12-07 PROCEDURE — 87210 SMEAR WET MOUNT SALINE/INK: CPT | Mod: ZL | Performed by: NURSE PRACTITIONER

## 2021-12-07 RX ORDER — METRONIDAZOLE 500 MG/1
500 TABLET ORAL 2 TIMES DAILY
Qty: 14 TABLET | Refills: 0 | Status: SHIPPED | OUTPATIENT
Start: 2021-12-07 | End: 2021-12-21

## 2021-12-07 ASSESSMENT — PAIN SCALES - GENERAL: PAINLEVEL: MILD PAIN (3)

## 2021-12-07 NOTE — PROGRESS NOTES
ASSESSMENT/PLAN:    I have reviewed the nursing notes.  I have reviewed the findings, diagnosis, plan and need for follow up with the patient.    1. Vaginal discharge  2. Vaginal itching  - Wet Prep, Genital  -clue cells seen on wet mount, treating for BV. Informed patient and provided handout on what bacterial vaginosis is.   Follow up if any symptoms persist or worsen.     3. Bacterial vaginosis  - metroNIDAZOLE (FLAGYL) 500 MG tablet; Take 1 tablet (500 mg) by mouth 2 times daily for 7 days  Dispense: 14 tablet; Refill: 0    Discussed warning signs/symptoms indicative of need to f/u    Follow up if symptoms persist or worsen or concerns    I explained my diagnostic considerations and recommendations to the patient, who voiced understanding and agreement with the treatment plan. All questions were answered. We discussed potential side effects of any prescribed or recommended therapies, as well as expectations for response to treatments.    Tracy Rodriguez NP  12/7/2021  5:08 PM    HPI:  Sofya Brown is a 36 year old female who presents to [facility] today for concerns of vaginal discharge. In past few days, the discharge has been mostly white in color and thin. She did state that she has been having a lot of vaginal discharge since she got her IUD placed (1 year ago). She does have spotting once per month with the IUD, but reports some irregular spotting ever since getting covid vaccination in October. The vaginal tissue is burning and itching. Also reports a pink bulge in vaginal area and questions possible rectocele. She mostly notices this bulge sensation with coughing. Has been coughing/bearing down a lot lately due to recent upper respiratory illness. No fevers or chills. No urinary symptoms including frequency, urgency, hematuria are present. She has been using topical vaginal cream which has not been helping for vaginal itch etc. No history of yeast infection or bacterial vaginosis. She has felt run  down/fatigued for past 2 days as well. She is established with an OB here at [facility]; Nati Morfin.     Past Medical History:   Diagnosis Date     Dysplasia of cervix uteri     cryotherapy .     Family history of other specified conditions (CODE)     following Pertussis vaccination, last seizure .     Major depressive disorder, single episode     No Comments Provided     Migraine without status migrainosus, not intractable     No Comments Provided     Other specified postprocedural states     at [facility], abnormal pap smears times 2.  Two sexual partners in her left.     Vaginal delivery          Past Surgical History:   Procedure Laterality Date     OTHER SURGICAL HISTORY      2 mo,,HERNIA REPAIR     Social History     Tobacco Use     Smoking status: Current Every Day Smoker     Packs/day: 0.20     Types: Cigarettes     Last attempt to quit: 3/24/2018     Years since quitting: 3.7     Smokeless tobacco: Never Used     Tobacco comment: Quit smoking: is trying, 1-2 a day   Substance Use Topics     Alcohol use: Not Currently     Alcohol/week: 1.0 standard drink     Comment: Occ.     Current Outpatient Medications   Medication Sig Dispense Refill     metroNIDAZOLE (FLAGYL) 500 MG tablet Take 1 tablet (500 mg) by mouth 2 times daily for 7 days 14 tablet 0     albuterol (PROAIR HFA/PROVENTIL HFA/VENTOLIN HFA) 108 (90 Base) MCG/ACT inhaler Inhale 2 puffs into the lungs every 6 hours 18 g 0     ALPRAZolam (XANAX) 0.25 MG tablet Take 1 tablet (0.25 mg) by mouth 2 times daily as needed for anxiety 30 tablet 0     buPROPion (WELLBUTRIN XL) 150 MG 24 hr tablet  (Patient not taking: Reported on 2021)       cholecalciferol (VITAMIN D3) 5000 units (125 mcg) capsule Take 5,000 Units by mouth daily (Patient not taking: Reported on 2021)       ferrous sulfate (FEROSUL) 325 (65 Fe) MG tablet Take 1 tablet (325 mg) by mouth daily (with breakfast) (Patient not taking: Reported on  11/16/2021) 90 tablet 0     Magnesium Oxide 500 MG TABS Take 1 tablet by mouth daily (Patient not taking: Reported on 11/16/2021)       nicotine (NICODERM CQ) 14 MG/24HR 24 hr patch Place 1 patch onto the skin every 24 hours (Patient not taking: Reported on 7/7/2021) 30 patch 1     nicotine (NICODERM CQ) 21 MG/24HR 24 hr patch Place 1 patch onto the skin every 24 hours (Patient not taking: Reported on 7/7/2021) 60 patch 0     tiZANidine (ZANAFLEX) 4 MG tablet TAKE 1/2 TO 1 TABLET(2 TO 4 MG) BY MOUTH THREE TIMES DAILY AS NEEDED FOR MUSCLE SPASMS (Patient not taking: Reported on 11/16/2021) 30 tablet 2     venlafaxine (EFFEXOR-XR) 37.5 MG 24 hr capsule TAKE 1 CAPSULE(37.5 MG) BY MOUTH DAILY (Patient not taking: Reported on 11/16/2021) 90 capsule 0     Allergies   Allergen Reactions     Sertraline      Other reaction(s): Dizziness     Buspirone Anxiety and Other (See Comments)     Illuminating thoughts (hallucinations)     Wellbutrin [Bupropion] Anxiety     Past medical history, past surgical history, current medications and allergies reviewed and accurate to the best of my knowledge.      ROS:  Refer to HPI    /66 (BP Location: Right arm, Patient Position: Sitting, Cuff Size: Adult Regular)   Pulse 76   Temp 97.1  F (36.2  C) (Tympanic)   Resp 16   Wt 60.6 kg (133 lb 8 oz)   Breastfeeding No   BMI 26.07 kg/m      EXAM:  General Appearance: Well appearing 36 year old female, appropriate appearance for age. No acute distress   Respiratory: No increased work of breathing.  No cough appreciated.  Cardiac: RRR with no murmurs  :  + Thin, white discharge is observed near cervix and in vaginal vault. Tissue of the vagina and cervix without lesions of concern. Non tender during speculum examination. Labia bilaterally without abrasions. Skin is intact without erythema or irritation.   Musculoskeletal:  Equal movement of bilateral upper extremities.  Equal movement of bilateral lower extremities.  Normal gait.     Dermatological: no rashes noted of exposed skin  Psychological: normal affect, alert, oriented, and pleasant.     Results for orders placed or performed in visit on 12/07/21   Wet Prep, Genital     Status: Abnormal    Specimen: Vagina; Swab   Result Value Ref Range    Trichomonas Absent Absent    Yeast Absent Absent    Clue Cells Present (A) Absent    WBCs/high power field 2+ (A) None

## 2021-12-07 NOTE — NURSING NOTE
Chief Complaint   Patient presents with     Vaginal Problem     Patient stated she has been having a lot of vaginal discharge since she got her IUD placed (1 year ago). Been having spotting and pain for 3 days. Also is burning and itchy. Gets stabbing pains here and there. Has a pink bulge in vaginal area.    Initial /66 (BP Location: Right arm, Patient Position: Sitting, Cuff Size: Adult Regular)   Pulse 76   Temp 97.1  F (36.2  C) (Tympanic)   Resp 16   Wt 60.6 kg (133 lb 8 oz)   Breastfeeding No   BMI 26.07 kg/m   Estimated body mass index is 26.07 kg/m  as calculated from the following:    Height as of 7/7/21: 1.524 m (5').    Weight as of this encounter: 60.6 kg (133 lb 8 oz).  Medication Reconciliation: Completed     Advanced Care Directive Reviewed    Alvaro Oglesby LPN

## 2021-12-07 NOTE — PATIENT INSTRUCTIONS
Wet prep is pending as well as urinalysis. I will call with these results.     I recommend if symptoms persist or you continue to have problems with increased vaginal discharge or have any vaginal pain or concerns, please follow up with OBGYN.     I have provided information regarding bacterial vaginosis and vaginal yeast infection below, in case either of these is present for your information.     Patient Education     Bacterial Vaginosis    You have a vaginal infection called bacterial vaginosis (BV). Both good and bad bacteria are present in a healthy vagina. BV occurs when these bacteria get out of balance. The number of bad bacteria increase. And the number of good bacteria decrease. BV is linked with sexual activity, but it's not a sexually transmitted infection (STI).   BV may or may not cause symptoms. If symptoms do occur, they can include:     Thin, gray, milky-white, or sometimes green discharge    Unpleasant odor or  fishy  smell    Itching, burning, or pain in or around the vagina  It is not known what causes BV, but certain factors can make the problem more likely. These can include:     Douching    Spermicides    Use of antibiotics    Change in hormone levels with pregnancy, breastfeeding, or menopause    Having sex with a new partner    Having sex with more than one partner  BV will sometimes go away on its own. But treatment is often advised. This is because untreated BV can raise the risk of more serious health problems such as:     Pelvic inflammatory disease (PID)     delivery (giving birth to a baby early if you re pregnant)    HIV and some other sexually transmitted infections (STIs)    Infection after surgery on the reproductive organs  Home care  General care    BV is most often treated with medicines called antibiotics. These may be given as pills or as a vaginal cream. If antibiotics are prescribed, be sure to use them exactly as directed. And complete all of the medicine, even if  your symptoms go away.    Don't douche or having sex during treatment.    If you have sex with a female partner, ask your healthcare provider if she should also be treated.  Prevention    Don't douche.    Don't have sex. If you do have sex, then take steps to lower your risk:  ? Use condoms when having sex.  ? Limit the number of sex partners you have.    Follow-up care  Follow up with your healthcare provider, or as advised.   When to get medical advice  Call your healthcare provider right away if:     You have a fever of 100.4 F (38 C) or higher, or as directed by your provider.    Your symptoms get worse, or they don t go away within a few days of starting treatment.    You have new pain in the lower belly or pelvic region.    You have side effects that bother you or a reaction to the pills or cream you re prescribed.    You or any of your sex partners have new symptoms, such as a rash, joint pain, or sores.  [employer] last reviewed this educational content on 6/1/2020 2000-2021 [employer]. All rights reserved. This information is not intended as a substitute for professional medical care. Always follow your healthcare professional's instructions.           Patient Education     Vaginal Infection: Yeast (Candidiasis)  Yeast infection occurs when yeast in the vagina increase and attacks the vaginal tissues. Yeast is a type of fungus. These infections are often caused by a type of yeast called Candida albicans. Other species of yeast can also cause infections. Factors that may make infection more likely include recent antibiotic use, douching, or increased sex. Yeast infections are more common in women who have diabetes, or are obese or pregnant, or have a weak immune system.   Symptoms of yeast infection    Clumpy or thin, white discharge, which may look like cottage cheese    No odor or minimal odor    Severe vaginal itching or burning    Burning with urination    Swelling, redness of  vulva    Pain during sex    Treating yeast infection  Yeast infection is treated with a vaginal antifungal cream. In some cases, antifungal pills are prescribed instead. During treatment:     Finish all of your medicine, even if your symptoms go away.    Apply the cream before going to bed. Lie flat after applying so that it doesn't drip out.    Don't douche or use tampons.    Don't rely on a diaphragm or condoms, since the cream may weaken them.    Avoid intercourse if advised by your healthcare provider.  Should I treat a yeast infection myself?  Discuss with your healthcare provider whether you should use over-the-counter medicines to treat a yeast infection. Self-treatment may depend on whether:     You've had a yeast infection in the past.    You're at risk for sexually transmitted infections (STIs).  Call your healthcare provider if symptoms don't go away or come back after treatment.   [employer] last reviewed this educational content on 4/1/2020 2000-2021 [employer]. All rights reserved. This information is not intended as a substitute for professional medical care. Always follow your healthcare professional's instructions.

## 2021-12-08 NOTE — NURSING NOTE
"Chief Complaint   Patient presents with     Headache         Initial /80   Pulse 88   Temp 98.1  F (36.7  C) (Temporal)   Resp 18   Ht 1.549 m (5' 1\")   Wt 55.6 kg (122 lb 8 oz)   SpO2 98%   BMI 23.15 kg/m   Estimated body mass index is 23.15 kg/m  as calculated from the following:    Height as of this encounter: 1.549 m (5' 1\").    Weight as of this encounter: 55.6 kg (122 lb 8 oz).    Medication Reconciliation: complete      Norma J. Gosselin, LPN  " DISPLAY PLAN FREE TEXT

## 2021-12-21 ENCOUNTER — MYC MEDICAL ADVICE (OUTPATIENT)
Dept: FAMILY MEDICINE | Facility: OTHER | Age: 36
End: 2021-12-21
Payer: COMMERCIAL

## 2021-12-21 ENCOUNTER — TELEPHONE (OUTPATIENT)
Dept: OBGYN | Facility: OTHER | Age: 36
End: 2021-12-21
Payer: COMMERCIAL

## 2021-12-21 DIAGNOSIS — N76.0 BACTERIAL VAGINOSIS: ICD-10-CM

## 2021-12-21 DIAGNOSIS — B96.89 BACTERIAL VAGINOSIS: ICD-10-CM

## 2021-12-21 RX ORDER — METRONIDAZOLE 500 MG/1
500 TABLET ORAL 2 TIMES DAILY
Qty: 14 TABLET | Refills: 0 | Status: SHIPPED | OUTPATIENT
Start: 2021-12-21 | End: 2021-12-31

## 2021-12-21 NOTE — TELEPHONE ENCOUNTER
Reason for call: Medication or medication refill    Name of medication requested: Antibiotic    Are you out of the medication? yes    What pharmacy do you use? [facility]     Preferred method for responding to this message: Telephone Call    Phone number patient can be reached at: Cell number on file:    Telephone Information:   Mobile [phone]       If we cannot reach you directly, may we leave a detailed response at the number you provided? Yes   Had BV went to  medicine gone last wed, and its back can you send script for her and call please

## 2021-12-28 ENCOUNTER — LAB REQUISITION (OUTPATIENT)
Dept: LAB | Facility: OTHER | Age: 36
End: 2021-12-28

## 2021-12-28 LAB
FLUAV RNA SPEC QL NAA+PROBE: NEGATIVE
FLUBV RNA RESP QL NAA+PROBE: NEGATIVE
RSV RNA SPEC NAA+PROBE: NEGATIVE
SARS-COV-2 RNA RESP QL NAA+PROBE: NEGATIVE

## 2021-12-28 PROCEDURE — 87637 SARSCOV2&INF A&B&RSV AMP PRB: CPT | Performed by: FAMILY MEDICINE

## 2021-12-31 ENCOUNTER — OFFICE VISIT (OUTPATIENT)
Dept: FAMILY MEDICINE | Facility: OTHER | Age: 36
End: 2021-12-31
Attending: PHYSICIAN ASSISTANT
Payer: COMMERCIAL

## 2021-12-31 VITALS
SYSTOLIC BLOOD PRESSURE: 108 MMHG | HEART RATE: 81 BPM | DIASTOLIC BLOOD PRESSURE: 66 MMHG | BODY MASS INDEX: 25.7 KG/M2 | OXYGEN SATURATION: 97 % | WEIGHT: 131.6 LBS | TEMPERATURE: 97.4 F | RESPIRATION RATE: 18 BRPM

## 2021-12-31 DIAGNOSIS — N76.0 BV (BACTERIAL VAGINOSIS): Primary | ICD-10-CM

## 2021-12-31 DIAGNOSIS — B96.89 BV (BACTERIAL VAGINOSIS): Primary | ICD-10-CM

## 2021-12-31 DIAGNOSIS — F33.0 MILD EPISODE OF RECURRENT MAJOR DEPRESSIVE DISORDER (H): ICD-10-CM

## 2021-12-31 DIAGNOSIS — F41.1 ANXIETY STATE: ICD-10-CM

## 2021-12-31 PROBLEM — Z37.9 NORMAL LABOR: Status: RESOLVED | Noted: 2020-09-21 | Resolved: 2021-12-31

## 2021-12-31 PROBLEM — Z36.89 ENCOUNTER FOR TRIAGE IN PREGNANT PATIENT: Status: RESOLVED | Noted: 2020-07-06 | Resolved: 2021-12-31

## 2021-12-31 LAB
ANION GAP SERPL CALCULATED.3IONS-SCNC: 9 MMOL/L (ref 3–14)
BASOPHILS # BLD AUTO: 0 10E3/UL (ref 0–0.2)
BASOPHILS NFR BLD AUTO: 1 %
BUN SERPL-MCNC: 11 MG/DL (ref 7–25)
CALCIUM SERPL-MCNC: 9.7 MG/DL (ref 8.6–10.3)
CHLORIDE BLD-SCNC: 107 MMOL/L (ref 98–107)
CO2 SERPL-SCNC: 22 MMOL/L (ref 21–31)
CREAT SERPL-MCNC: 0.64 MG/DL (ref 0.6–1.2)
DEPRECATED CALCIDIOL+CALCIFEROL SERPL-MC: 29 UG/L (ref 30–100)
EOSINOPHIL # BLD AUTO: 0.2 10E3/UL (ref 0–0.7)
EOSINOPHIL NFR BLD AUTO: 3 %
ERYTHROCYTE [DISTWIDTH] IN BLOOD BY AUTOMATED COUNT: 12.8 % (ref 10–15)
GFR SERPL CREATININE-BSD FRML MDRD: >90 ML/MIN/1.73M2
GLUCOSE BLD-MCNC: 87 MG/DL (ref 70–105)
HCT VFR BLD AUTO: 43 % (ref 35–47)
HGB BLD-MCNC: 14.4 G/DL (ref 11.7–15.7)
IMM GRANULOCYTES # BLD: 0 10E3/UL
IMM GRANULOCYTES NFR BLD: 0 %
LYMPHOCYTES # BLD AUTO: 1.8 10E3/UL (ref 0.8–5.3)
LYMPHOCYTES NFR BLD AUTO: 28 %
MCH RBC QN AUTO: 30 PG (ref 26.5–33)
MCHC RBC AUTO-ENTMCNC: 33.5 G/DL (ref 31.5–36.5)
MCV RBC AUTO: 90 FL (ref 78–100)
MONOCYTES # BLD AUTO: 0.4 10E3/UL (ref 0–1.3)
MONOCYTES NFR BLD AUTO: 7 %
NEUTROPHILS # BLD AUTO: 3.9 10E3/UL (ref 1.6–8.3)
NEUTROPHILS NFR BLD AUTO: 61 %
NRBC # BLD AUTO: 0 10E3/UL
NRBC BLD AUTO-RTO: 0 /100
PLATELET # BLD AUTO: 276 10E3/UL (ref 150–450)
POTASSIUM BLD-SCNC: 4.3 MMOL/L (ref 3.5–5.1)
RBC # BLD AUTO: 4.8 10E6/UL (ref 3.8–5.2)
SODIUM SERPL-SCNC: 138 MMOL/L (ref 134–144)
TSH SERPL DL<=0.005 MIU/L-ACNC: 1.03 MU/L (ref 0.4–4)
WBC # BLD AUTO: 6.5 10E3/UL (ref 4–11)

## 2021-12-31 PROCEDURE — 36415 COLL VENOUS BLD VENIPUNCTURE: CPT | Mod: ZL | Performed by: PHYSICIAN ASSISTANT

## 2021-12-31 PROCEDURE — 80048 BASIC METABOLIC PNL TOTAL CA: CPT | Mod: ZL | Performed by: PHYSICIAN ASSISTANT

## 2021-12-31 PROCEDURE — 82306 VITAMIN D 25 HYDROXY: CPT | Mod: ZL | Performed by: PHYSICIAN ASSISTANT

## 2021-12-31 PROCEDURE — 99214 OFFICE O/P EST MOD 30 MIN: CPT | Performed by: PHYSICIAN ASSISTANT

## 2021-12-31 PROCEDURE — 85025 COMPLETE CBC W/AUTO DIFF WBC: CPT | Mod: ZL | Performed by: PHYSICIAN ASSISTANT

## 2021-12-31 PROCEDURE — 84443 ASSAY THYROID STIM HORMONE: CPT | Mod: ZL | Performed by: PHYSICIAN ASSISTANT

## 2021-12-31 RX ORDER — METRONIDAZOLE 7.5 MG/G
1 GEL VAGINAL EVERY EVENING
Qty: 25 G | Refills: 11 | Status: SHIPPED | OUTPATIENT
Start: 2021-12-31 | End: 2022-09-20

## 2021-12-31 RX ORDER — CITALOPRAM HYDROBROMIDE 10 MG/1
10 TABLET ORAL DAILY
Qty: 90 TABLET | Refills: 0 | Status: SHIPPED | OUTPATIENT
Start: 2021-12-31 | End: 2022-04-01

## 2021-12-31 RX ORDER — METRONIDAZOLE 7.5 MG/G
1 GEL VAGINAL EVERY EVENING
Qty: 25 G | Refills: 0 | Status: SHIPPED | OUTPATIENT
Start: 2021-12-31 | End: 2021-12-31

## 2021-12-31 ASSESSMENT — PATIENT HEALTH QUESTIONNAIRE - PHQ9
10. IF YOU CHECKED OFF ANY PROBLEMS, HOW DIFFICULT HAVE THESE PROBLEMS MADE IT FOR YOU TO DO YOUR WORK, TAKE CARE OF THINGS AT HOME, OR GET ALONG WITH OTHER PEOPLE: SOMEWHAT DIFFICULT
SUM OF ALL RESPONSES TO PHQ QUESTIONS 1-9: 9
SUM OF ALL RESPONSES TO PHQ QUESTIONS 1-9: 9

## 2021-12-31 ASSESSMENT — ANXIETY QUESTIONNAIRES
3. WORRYING TOO MUCH ABOUT DIFFERENT THINGS: MORE THAN HALF THE DAYS
2. NOT BEING ABLE TO STOP OR CONTROL WORRYING: MORE THAN HALF THE DAYS
4. TROUBLE RELAXING: NEARLY EVERY DAY
GAD7 TOTAL SCORE: 19
5. BEING SO RESTLESS THAT IT IS HARD TO SIT STILL: NEARLY EVERY DAY
7. FEELING AFRAID AS IF SOMETHING AWFUL MIGHT HAPPEN: NEARLY EVERY DAY
7. FEELING AFRAID AS IF SOMETHING AWFUL MIGHT HAPPEN: NEARLY EVERY DAY
1. FEELING NERVOUS, ANXIOUS, OR ON EDGE: NEARLY EVERY DAY
GAD7 TOTAL SCORE: 19
6. BECOMING EASILY ANNOYED OR IRRITABLE: NEARLY EVERY DAY
GAD7 TOTAL SCORE: 19

## 2021-12-31 ASSESSMENT — PAIN SCALES - GENERAL: PAINLEVEL: NO PAIN (0)

## 2021-12-31 NOTE — PROGRESS NOTES
"Nursing Notes:   Chin Osorio LPN  12/31/2021 11:47 AM  Signed  Chief Complaint   Patient presents with     Behavioral Problem     \"crying all the time\"    Patient denies any life stressors. Patient also wants to know if if is normal to have discharge after an IUD placement? Patient reports she was diagnosed with BV- given antibiotics and the BV came back on the third day of antibiotics. Patient states Dr. Whyte gave her another prescription for BV for over Christmas.     Initial /66   Pulse 81   Temp 97.4  F (36.3  C) (Tympanic)   Resp 18   Wt 59.7 kg (131 lb 9.6 oz)   SpO2 97%   Breastfeeding No   BMI 25.70 kg/m   Estimated body mass index is 25.7 kg/m  as calculated from the following:    Height as of 7/7/21: 1.524 m (5').    Weight as of this encounter: 59.7 kg (131 lb 9.6 oz).  Medication Reconciliation: complete    FOOD SECURITY SCREENING QUESTIONS  Hunger Vital Signs:  Within the past 12 months we worried whether our food would run out before we got money to buy more. Never  Within the past 12 months the food we bought just didn't last and we didn't have money to get more. Never    Advanced Care Directive Reviewed    Chin Osorio LPN        HPI:    Sofya Brown is a 36 year old female who presents for several concerns.     Patient states that recently she has had crying spells. Went to see the [place] lights to complete weeks ago. While she was watching them she started crying. She ended up watching a sad video yesterday and suddenly started crying. She is unsure why she started crying. Denies any specific life stressors. History of anxiety and depression. Wondering if she has ADHD. No history of testing in the past. Has a hard time sitting down and relaxing. She states that she has to keep busy. Hard time sitting still and relaxing. Hard time focusing on things. She will then get hyper focused with things like reading or work for several hours. Feels like she always has anxiety. " Anxiety was worse this past spring. At the time she was worried about something bad happening. Now she is starting to get recurrent concerns about something bad that might happen. No depression symptoms. Not taking vitamin D. Has a poor memory. Word finding issues. History of taking several medications in the past. She has been on Wellbutrin, BuSpar (suicidal thoughts), hydroxyzine, Effexor, sertraline, fluoxetine (numbness), and Lexapro. Wondering if she can start a different medication. No suicidal or homicidal ideation.    Patient had an IUD placed 1 year ago. Since then she has had recurrent bacterial vaginosis. Has been given a couple rounds of metronidazole in the last couple months. The symptoms return after being on the medication for couple days. No vaginal itching. Increased vaginal discharge. Currently using toilet paper as a pad due to the amount of discharge. No pregnancy concerns.    Has noticed a small indent on the top anterior mid head at the suture line. No injury or trauma. No problems walking or talking. No seizures. No head injury. No bruising or swelling. Nontender. No loss of consciousness. Noticed it a few years ago. Concerned that it is getting wider and longer. No skin changes. It indents. History of migraines in the past however these have been improving. Currently getting them once per month. They have decreased in frequency over the last year since her pregnancy.    Patient denies any life stressors. Patient also wants to know if if is normal to have discharge after an IUD placement.  Patient reports she was diagnosed with BV- given antibiotics and the BV came back on the third day of antibiotics. Patient states Dr. Whyte gave her another prescription for BV for over Christmas.     Past Medical History:   Diagnosis Date     Dysplasia of cervix uteri     cryotherapy 01/07.     Family history of other specified conditions (CODE)     following Pertussis vaccination, last seizure 1991.      Major depressive disorder, single episode     No Comments Provided     Migraine without status migrainosus, not intractable     No Comments Provided     Other specified postprocedural states     at [facility], abnormal pap smears times 2.  Two sexual partners in her left.     Vaginal delivery            Past Surgical History:   Procedure Laterality Date     OTHER SURGICAL HISTORY      2 mo,,HERNIA REPAIR       Family History   Problem Relation Age of Onset     Family History Negative Mother         Good Health     Family History Negative Father         Good Health     No Known Problems Brother      No Known Problems Sister      No Known Problems Son                Social History     Tobacco Use     Smoking status: Current Every Day Smoker     Packs/day: 0.20     Types: Cigarettes     Last attempt to quit: 3/24/2018     Years since quitting: 3.7     Smokeless tobacco: Never Used     Tobacco comment: Quit smoking: is trying, 1-2 a day   Substance Use Topics     Alcohol use: Not Currently     Alcohol/week: 1.0 standard drink     Comment: Occ.       Current Outpatient Medications   Medication Sig Dispense Refill     albuterol (PROAIR HFA/PROVENTIL HFA/VENTOLIN HFA) 108 (90 Base) MCG/ACT inhaler Inhale 2 puffs into the lungs every 6 hours 18 g 0     ALPRAZolam (XANAX) 0.25 MG tablet Take 1 tablet (0.25 mg) by mouth 2 times daily as needed for anxiety 30 tablet 0     citalopram (CELEXA) 10 MG tablet Take 1 tablet (10 mg) by mouth daily 90 tablet 0     metroNIDAZOLE (METROGEL) 0.75 % vaginal gel Place 1 applicator (5 g) vaginally every evening Then decrease to twice weekly for 3 months 25 g 11       Allergies   Allergen Reactions     Sertraline      Other reaction(s): Dizziness     Buspirone Anxiety and Other (See Comments)     Illuminating thoughts (hallucinations)     Wellbutrin [Bupropion] Anxiety       REVIEW OFSYSTEMS:  Refer to HPI.    EXAM:   Vitals:    /66   Pulse 81   Temp 97.4   F (36.3  C) (Tympanic)   Resp 18   Wt 59.7 kg (131 lb 9.6 oz)   SpO2 97%   Breastfeeding No   BMI 25.70 kg/m    General appearance:appropriately dressed and well groomed  Head Exam: Small intent on the anterior fontanelle suture. Nontender to palpation. No swelling or bruising appreciated. No overlying erythema. Normocephalic, atraumatic.  Eye Exam:  Normal external eye, conjunctiva, lids,cornea. DERRICK.  Chest/Respiratory Exam: Normal chest wall and respirations. Clear to auscultation.  Cardiovascular Exam: Regular rate and rhythm. S1, S2, no murmur, click, gallop, or rubs.  Gastrointestinal Exam: Soft, non-tender, no masses or organomegaly. Normal BS x 4.  Musculoskeletal Exam: Back is straight, full ROM of upper and lower extremities.  Skin: no rash or abnormalities  Neurologic Exam: Nonfocal, normal gross motor, tone coordination and no tremor.  Psychiatric Exam: Alert and oriented - appropriate affect.  Attitude: cooperative  Behavior: normal  Eye Contact: normal  Speech: normal  Orientation: oriented to person, place, time and situation  Mood:  admits minimal sadness and moderate anxiety  Affect: Mood Congruent  Thought Process: clear  Suicidal or Homicidal Ideation: reports no thoughts or intentions  Hallucination: no    PHQ Depression Screen  PHQ-9 SCORE 2/25/2021 7/7/2021 12/31/2021   PHQ-9 Total Score MyChart - - 9 (Mild depression)   PHQ-9 Total Score 11 6 9       JANINE Anxiety Screen  JANINE-7 SCORE 2/25/2021 7/7/2021 12/31/2021   Total Score - - 19 (severe anxiety)   Total Score 19 18 19     Answers for HPI/ROS submitted by the patient on 12/31/2021  If you checked off any problems, how difficult have these problems made it for you to do your work, take care of things at home, or get along with other people?: Somewhat difficult  PHQ9 TOTAL SCORE: 9  JANINE 7 TOTAL SCORE: 19      ASSESSMENT AND PLAN:      ICD-10-CM    1. BV (bacterial vaginosis)  N76.0 metroNIDAZOLE (METROGEL) 0.75 % vaginal gel    B96.89  DISCONTINUED: metroNIDAZOLE (METROGEL) 0.75 % vaginal gel   2. Mild episode of recurrent major depressive disorder (H)  F33.0 citalopram (CELEXA) 10 MG tablet     CBC and Differential     Basic Metabolic Panel     TSH Reflex GH     Vitamin D Total     Vitamin D Total     TSH Reflex GH     Basic Metabolic Panel     CBC and Differential   3. Anxiety state  F41.1 citalopram (CELEXA) 10 MG tablet     CBC and Differential     Basic Metabolic Panel     TSH Reflex GH     Vitamin D Total     Vitamin D Total     TSH Reflex GH     Basic Metabolic Panel     CBC and Differential     Anxiety and depression:  Started on citalopram 10 mg. Gave side effect profile. Gave warning signs and symptoms. Encourage close follow-up if needed.  Encouraged good diet and exercise.   Encouraged to see a counselor.   Return in 4 weeks for recheck.   Return to clinic with change/worsening of symptoms.     Complete labs to rule out concerns with increased anxiety, depression, and fatigue: Complete a CBC, BMP, TSH, and vitamin D. Labs are pending.    Recurrent bacterial vaginosis: Patient was given metronidazole vaginal gel to use nightly for 5 days. She should then decrease the medication to twice weekly over the next 3 to 6 months to help prevent bacterial vaginosis. Encourage close follow-up with OB/GYN if symptoms are not resolving or worsening. Encourage following up with OB/GYN if symptoms return after therapy is completed. May need to remove IUD in the future if symptoms are recurrent or worsening.    Reviewed risks and benefits of medications and other treatment options.   Pt is advised to call if side effects to medications occur, especially if exaggerated/dramatic.    35 minutes spent on the date of the encounter doing chart review, history and exam, documentation and further activities as noted above    Patient Instructions     Started on citalopram 10 mg.  Encouraged good diet and exercise.   Encouraged to see a counselor.   Return in 4  weeks for recheck.   Return to clinic with change/worsening of symptoms.       Suicide Emergency First call for help:  [phone]    Depression: Tips to Help Yourself  As your healthcare providers help treat your depression, you can also help yourself. Keep in mind that your illness affects you emotionally, physically, mentally, and socially. So full recovery will take time. Take care of your body and your soul, and be patient with yourself as you get better.    Self-care    Educate yourself. Read about treatment and medicine options. If you have the energy, attend local conferences or support groups. Keep a list of useful websites and helpful books and use them as needed. This illness is not your fault. Don t blame yourself for your depression.    Manage early symptoms. If you notice symptoms returning, experience triggers, or identify other factors that may lead to a depressive episode, get help as soon as possible. Ask trusted friends and family to monitor your behavior and let you know if they see anything of concern.    Work with your provider. Find a provider you can trust. Communicate honestly with that person and share information on your treatment for depression and your reaction to medicines.    Be prepared for a crisis. Know what to do if you experience a crisis. Keep the phone number of a crisis hotline and know the location of your community's urgent care centers and the closest emergency department.    Hold off on big decisions. Depression can cloud your judgment. So wait until you feel better before making major life decisions, such as changing jobs, moving, or getting  or .    Be patient. Recovering from depression is a process. Don t be discouraged if it takes some time to feel better.    Keep it simple. Depression saps your energy and concentration. So you won t be able to do all the things you used to do. Set small goals and do what you can.    Be with others.  Don t isolate yourself--you ll only feel worse. Try to be with other people. And take part in fun activities when you can. Go to a movie, ballgame, Gnosticist service, or social event. Talk openly with people you can trust. And accept help when it s offered.  Take care of your body  People with depression often lose the desire to take care of themselves. That only makes their problems worse. During treatment and afterward, make a point to:    Exercise. It s a great way to take care of your body. And studies have shown that exercise helps fight depression.    Avoid drugs and alcohol. These may ease the pain in the short term. But they ll only make your problems worse in the long run.    Get relief from stress. Ask your healthcare provider for relaxation exercises and techniques to help relieve stress.    Eat right. A balanced and healthy diet helps keep your body healthy.  Date Last Reviewed: 1/1/2017 2000-2017 [employer]. [address] [place] [address]. All rights reserved. This information is not intended as a substitute for professional medical care. Always follow your healthcare professional's instructions.         Treating Anxiety Disorders with Therapy    If you have an anxiety disorder, you don t have to suffer anymore. Treatment is available. Therapy (also called counseling) is often a helpful treatment for anxiety disorders. With therapy, a specially trained professional (therapist) helps you face and learn to manage your anxiety. Therapy can be short-term or long-term depending on your needs. In some cases, medicine may also be prescribed with therapy. It may take time before you notice how much therapy is helping, but stick with it. With therapy, you can feel better.  Cognitive behavioral therapy (CBT)  Cognitive behavioral therapy (CBT) teaches you to manage anxiety. It does this by helping you understand how you think and act when you re anxious. Research has shown CBT to be a  very effective treatment for anxiety disorders. How CBT is run is almost like a class. It involves homework and activities to build skills that teach you to cope with anxiety step by step. It can be done in a group or one-on-one, and often takes place for a set number of sessions. CBT has two main parts:    Cognitive therapy helps you identify the negative, irrational thoughts that occur with your anxiety. You ll learn to replace these with more positive, realistic thoughts.    Behavioral therapy helps you change how you react to anxiety. You ll learn coping skills and methods for relaxing to help you better deal with anxiety.  Other forms of therapy  Other therapy methods may work better for you than CBT. Or, you may move from CBT to another form of therapy as your treatment needs change. This may mean meeting with a therapist by yourself or in a group. Therapy can also help you work through problems in your life, such as drug or alcohol dependence, that may be making your anxiety worse.  Getting better takes time  Therapy will help you feel better and teach you skills to help manage anxiety long term. But change doesn t happen right away. It takes a commitment from you. And treatment only works if you learn to face the causes of your anxiety. So, you might feel worse before you feel better. This can sometimes make it hard to stick with it. But remember: Therapy is a very effective treatment. The results will be well worth it.  Helping yourself  If anxiety is wearing you down, here are some things you can do to cope:    Check with your doctor and rule out any physical problems that may be causing the anxiety symptoms.    If an anxiety disorder is diagnosed, seek mental healthcare. This is an illness and it can respond to treatment. Most types of anxiety disorders will respond to talk therapy and medicine.    Educate yourself about anxiety disorders. Keep track of helpful online resources and books you can use  during stressful periods.    Try stress management techniques such as meditation.    Consider online or in-person support groups.    Don t fight your feelings. Anxiety feeds itself. The more you worry about it, the worse it gets. Instead, try to identify what might have triggered your anxiety. Then try to put this threat in perspective.    Keep in mind that you can t control everything about a situation. Change what you can and let the rest take its course.    Exercise -- it s a great way to relieve tension and help your body feel relaxed.    Examine your life for stress, and try to find ways to reduce it.    Avoid caffeine and nicotine, which can make anxiety symptoms worse.    Fight the temptation to turn to alcohol or unprescribed drugs for relief. They only make things worse in the long run.   Date Last Reviewed: 2017-2017 [employer]. [address] [place] [address]. All rights reserved. This information is not intended as a substitute for professional medical care. Always follow your healthcare professional's instructions.          Counselors:     [facility]: [phone]    Zulema Morfin: [phone]    Angelita Montero: [phone]    Anders Morfin CNP[facility]: [phone]    Emilie Burroughs: [phone]    [facility]: [phone]    Niraj Cobos: [phone]    Estela [phone]    [facility]: [phone]    Barbara Finesse: [phone]    [facility]: [phone]    Adolescent Counseling:   [facility]: [phone]    [facility]: [phone]    [facility]: [phone]           Chelle Queen PA-C ..................2021 11:52 AM

## 2021-12-31 NOTE — NURSING NOTE
"Chief Complaint   Patient presents with     Behavioral Problem     \"crying all the time\"    Patient denies any life stressors. Patient also wants to know if if is normal to have discharge after an IUD placement? Patient reports she was diagnosed with BV- given antibiotics and the BV came back on the third day of antibiotics. Patient states Dr. Whyte gave her another prescription for BV for over Christmas.     Initial /66   Pulse 81   Temp 97.4  F (36.3  C) (Tympanic)   Resp 18   Wt 59.7 kg (131 lb 9.6 oz)   SpO2 97%   Breastfeeding No   BMI 25.70 kg/m   Estimated body mass index is 25.7 kg/m  as calculated from the following:    Height as of 7/7/21: 1.524 m (5').    Weight as of this encounter: 59.7 kg (131 lb 9.6 oz).  Medication Reconciliation: complete    FOOD SECURITY SCREENING QUESTIONS  Hunger Vital Signs:  Within the past 12 months we worried whether our food would run out before we got money to buy more. Never  Within the past 12 months the food we bought just didn't last and we didn't have money to get more. Never    Advanced Care Directive Reviewed    Chin Osorio LPN    "

## 2021-12-31 NOTE — PATIENT INSTRUCTIONS
Started on citalopram 10 mg.  Encouraged good diet and exercise.   Encouraged to see a counselor.   Return in 4 weeks for recheck.   Return to clinic with change/worsening of symptoms.       Suicide Emergency First call for help:  [phone]    Depression: Tips to Help Yourself  As your healthcare providers help treat your depression, you can also help yourself. Keep in mind that your illness affects you emotionally, physically, mentally, and socially. So full recovery will take time. Take care of your body and your soul, and be patient with yourself as you get better.    Self-care    Educate yourself. Read about treatment and medicine options. If you have the energy, attend local conferences or support groups. Keep a list of useful websites and helpful books and use them as needed. This illness is not your fault. Don t blame yourself for your depression.    Manage early symptoms. If you notice symptoms returning, experience triggers, or identify other factors that may lead to a depressive episode, get help as soon as possible. Ask trusted friends and family to monitor your behavior and let you know if they see anything of concern.    Work with your provider. Find a provider you can trust. Communicate honestly with that person and share information on your treatment for depression and your reaction to medicines.    Be prepared for a crisis. Know what to do if you experience a crisis. Keep the phone number of a crisis hotline and know the location of your community's urgent care centers and the closest emergency department.    Hold off on big decisions. Depression can cloud your judgment. So wait until you feel better before making major life decisions, such as changing jobs, moving, or getting  or .    Be patient. Recovering from depression is a process. Don t be discouraged if it takes some time to feel better.    Keep it simple. Depression saps your energy and concentration. So you  won t be able to do all the things you used to do. Set small goals and do what you can.    Be with others. Don t isolate yourself--you ll only feel worse. Try to be with other people. And take part in fun activities when you can. Go to a movie, ballgame, Alevism service, or social event. Talk openly with people you can trust. And accept help when it s offered.  Take care of your body  People with depression often lose the desire to take care of themselves. That only makes their problems worse. During treatment and afterward, make a point to:    Exercise. It s a great way to take care of your body. And studies have shown that exercise helps fight depression.    Avoid drugs and alcohol. These may ease the pain in the short term. But they ll only make your problems worse in the long run.    Get relief from stress. Ask your healthcare provider for relaxation exercises and techniques to help relieve stress.    Eat right. A balanced and healthy diet helps keep your body healthy.  Date Last Reviewed: 1/1/2017 2000-2017 [employer]. [address] [place] [address]. All rights reserved. This information is not intended as a substitute for professional medical care. Always follow your healthcare professional's instructions.         Treating Anxiety Disorders with Therapy    If you have an anxiety disorder, you don t have to suffer anymore. Treatment is available. Therapy (also called counseling) is often a helpful treatment for anxiety disorders. With therapy, a specially trained professional (therapist) helps you face and learn to manage your anxiety. Therapy can be short-term or long-term depending on your needs. In some cases, medicine may also be prescribed with therapy. It may take time before you notice how much therapy is helping, but stick with it. With therapy, you can feel better.  Cognitive behavioral therapy (CBT)  Cognitive behavioral therapy (CBT) teaches you to manage anxiety. It  does this by helping you understand how you think and act when you re anxious. Research has shown CBT to be a very effective treatment for anxiety disorders. How CBT is run is almost like a class. It involves homework and activities to build skills that teach you to cope with anxiety step by step. It can be done in a group or one-on-one, and often takes place for a set number of sessions. CBT has two main parts:    Cognitive therapy helps you identify the negative, irrational thoughts that occur with your anxiety. You ll learn to replace these with more positive, realistic thoughts.    Behavioral therapy helps you change how you react to anxiety. You ll learn coping skills and methods for relaxing to help you better deal with anxiety.  Other forms of therapy  Other therapy methods may work better for you than CBT. Or, you may move from CBT to another form of therapy as your treatment needs change. This may mean meeting with a therapist by yourself or in a group. Therapy can also help you work through problems in your life, such as drug or alcohol dependence, that may be making your anxiety worse.  Getting better takes time  Therapy will help you feel better and teach you skills to help manage anxiety long term. But change doesn t happen right away. It takes a commitment from you. And treatment only works if you learn to face the causes of your anxiety. So, you might feel worse before you feel better. This can sometimes make it hard to stick with it. But remember: Therapy is a very effective treatment. The results will be well worth it.  Helping yourself  If anxiety is wearing you down, here are some things you can do to cope:    Check with your doctor and rule out any physical problems that may be causing the anxiety symptoms.    If an anxiety disorder is diagnosed, seek mental healthcare. This is an illness and it can respond to treatment. Most types of anxiety disorders will respond to talk therapy and  medicine.    Educate yourself about anxiety disorders. Keep track of helpful online resources and books you can use during stressful periods.    Try stress management techniques such as meditation.    Consider online or in-person support groups.    Don t fight your feelings. Anxiety feeds itself. The more you worry about it, the worse it gets. Instead, try to identify what might have triggered your anxiety. Then try to put this threat in perspective.    Keep in mind that you can t control everything about a situation. Change what you can and let the rest take its course.    Exercise -- it s a great way to relieve tension and help your body feel relaxed.    Examine your life for stress, and try to find ways to reduce it.    Avoid caffeine and nicotine, which can make anxiety symptoms worse.    Fight the temptation to turn to alcohol or unprescribed drugs for relief. They only make things worse in the long run.   Date Last Reviewed: 2017-2017 [employer]. [address] [place] [address]. All rights reserved. This information is not intended as a substitute for professional medical care. Always follow your healthcare professional's instructions.          Counselors:     [facility]: [phone]    Zulema Morfin: [phone]    Angelita Montero: [phone]    Anders Morfin CNP, [facility]: [phone]    Emilie Burroughs: [phone]    [facility]: [phone]    Niraj Cobos: [phone]    [phone]    [facility]: [phone]    Barbara Kilpatrick: [phone]    [facility]: [phone]    Adolescent Counseling:   [facility]: [phone]    [facility]: [phone]    [facility]: [phone]

## 2022-01-01 ASSESSMENT — ANXIETY QUESTIONNAIRES: GAD7 TOTAL SCORE: 19

## 2022-01-01 ASSESSMENT — PATIENT HEALTH QUESTIONNAIRE - PHQ9: SUM OF ALL RESPONSES TO PHQ QUESTIONS 1-9: 9

## 2022-03-17 ENCOUNTER — MYC MEDICAL ADVICE (OUTPATIENT)
Dept: OBGYN | Facility: OTHER | Age: 37
End: 2022-03-17
Payer: COMMERCIAL

## 2022-03-22 ENCOUNTER — OFFICE VISIT (OUTPATIENT)
Dept: OBGYN | Facility: OTHER | Age: 37
End: 2022-03-22
Attending: OBSTETRICS & GYNECOLOGY
Payer: COMMERCIAL

## 2022-03-22 VITALS — HEART RATE: 72 BPM | DIASTOLIC BLOOD PRESSURE: 76 MMHG | SYSTOLIC BLOOD PRESSURE: 118 MMHG

## 2022-03-22 DIAGNOSIS — Z30.432 ENCOUNTER FOR IUD REMOVAL: ICD-10-CM

## 2022-03-22 DIAGNOSIS — N81.6 RECTOCELE: Primary | ICD-10-CM

## 2022-03-22 DIAGNOSIS — Z30.011 ENCOUNTER FOR INITIAL PRESCRIPTION OF CONTRACEPTIVE PILLS: ICD-10-CM

## 2022-03-22 PROCEDURE — 99214 OFFICE O/P EST MOD 30 MIN: CPT | Mod: 25 | Performed by: OBSTETRICS & GYNECOLOGY

## 2022-03-22 PROCEDURE — 58301 REMOVE INTRAUTERINE DEVICE: CPT | Performed by: OBSTETRICS & GYNECOLOGY

## 2022-03-22 RX ORDER — NORGESTIMATE AND ETHINYL ESTRADIOL 0.25-0.035
1 KIT ORAL DAILY
Qty: 84 TABLET | Refills: 1 | Status: SHIPPED | OUTPATIENT
Start: 2022-03-22 | End: 2022-09-22

## 2022-03-22 ASSESSMENT — PAIN SCALES - GENERAL: PAINLEVEL: NO PAIN (0)

## 2022-03-22 NOTE — NURSING NOTE
Chief Complaint   Patient presents with     Consult     Possible prolapse      IUD     Possible removal        Medication Reconciliation: complete   Prior to the start of the procedure and with procedural staff participation, I verbally confirmed the patient s identity using two indicators, relevant allergies, that the procedure was appropriate and matched the consent or emergent situation, and that the correct equipment/implants were available. Immediately prior to starting the procedure I conducted the Time Out with the procedural staff and re-confirmed the patient s name, procedure, and site/side. (The Joint Commission universal protocol was followed.)  Yes    Sedation (Moderate or Deep): None      Susan Birmingham LPN........................3/22/2022  3:12 PM

## 2022-03-22 NOTE — PROGRESS NOTES
Follow-Up Visit    S: Ms. Sofya Brown is a 36 year old  here for several concerns. She reports increased vaginal discharge since IUD placement. It is not irritative but the amount is very bothersome. She has a vaginal bulge, wonders if she has prolapse. She also started getting her periods back last fall, only spotting. The days before her period, she has extreme fatigue and headaches. She wonders if this is also IUD related. Her bowels vary between normal and constipation.    O:  /76 (BP Location: Right arm, Patient Position: Sitting, Cuff Size: Adult Regular)   Pulse 72   Breastfeeding No   Gen: Well-appearing, NAD  Pulm: nonlabored  Psych: appropriate mood and affect    Pelvic:  Normal appearing external female genitalia. Normal hair distribution. Vagina is without lesions. Moderate white discharge. Cervix normal, no lesions, no cervical motion tenderness. Uterus is small, mobile, non-tender. No adnexal tenderness or masses. Grade 1 cystocele, grade 2 rectocele. No significant apical prolapse.    Procedure Note  After obtaining informed consent, the IUD strings were grasped and the device easily removed with gentle traction, appeared intact.    A/P:  Ms. Sofya Brown is a 36 year old  here for multiple concerns.  - Vaginal discharge: not irritative, explained that it could be related to the IUD but also could be hormonally mediated. She desired IUD removal and wants to switch to OCPs for contraception  - premenstrual symptoms: explained these are unlikely to be IUD related, more likely hormonal. Discussed that ovarian suppression is the only way to determine the impact of hormonal fluctuation with OCPs/patch/ring or Depo. She wants to try OCPs. She is still smoking 1/2 ppd. Discussed increased risks of VTE and stroke if she is still smoking while on OCPs and over age 35. Recommend she quit smoking before her birthday. If she does, she can call for more refills of her pills.  -  Prolapse: discussed exam findings. Wants to try physical therapy. Referral placed    RTC prn    Claudia Morfin MD  OB/GYN  3/22/2022 3:51 PM

## 2022-04-01 DIAGNOSIS — F41.1 ANXIETY STATE: ICD-10-CM

## 2022-04-01 DIAGNOSIS — F33.0 MILD EPISODE OF RECURRENT MAJOR DEPRESSIVE DISORDER (H): ICD-10-CM

## 2022-04-01 RX ORDER — CITALOPRAM HYDROBROMIDE 10 MG/1
TABLET ORAL
Qty: 90 TABLET | Refills: 0 | Status: SHIPPED | OUTPATIENT
Start: 2022-04-01 | End: 2022-07-12

## 2022-04-01 NOTE — TELEPHONE ENCOUNTER
[facility] sent Rx request for the following:      Requested Prescriptions   Pending Prescriptions Disp Refills     citalopram (CELEXA) 10 MG tablet [Pharmacy Med Name: CITALOPRAM 10MG TABLETS] 90 tablet 0     Sig: TAKE 1 TABLET(10 MG) BY MOUTH DAILY     Last Prescription Date:   12/31/21  Last Fill Qty/Refills:         90, R-0    Last Office Visit:              12/31/21   Future Office visit:           None  Unable to complete prescription refill per RN Medication Refill Policy.   Per last OV note: Return in 4 weeks for recheck. Patient has not scheduled follow up.  Katlin Rodrigez RN on 4/1/2022 at 9:43 AM

## 2022-04-01 NOTE — TELEPHONE ENCOUNTER
Please call  Refilled medication.  Needs to be seen for recheck prior to future refills.  Chelle Queen PA-C.......... 4/1/2022 4:02 PM

## 2022-04-04 NOTE — TELEPHONE ENCOUNTER
Called patient and notified of med refill and need for an appt for future refills.  ...Joselito Moses LPN on 4/4/2022 at 3:08 PM

## 2022-05-21 ENCOUNTER — HEALTH MAINTENANCE LETTER (OUTPATIENT)
Age: 37
End: 2022-05-21

## 2022-07-09 DIAGNOSIS — F33.0 MILD EPISODE OF RECURRENT MAJOR DEPRESSIVE DISORDER (H): ICD-10-CM

## 2022-07-09 DIAGNOSIS — F41.1 ANXIETY STATE: ICD-10-CM

## 2022-07-11 NOTE — TELEPHONE ENCOUNTER
Routing refill request to provider for review/approval because:    LOV: 12/31/21  Patient due for follow up .  My chart message sent.  Will route to outreach to call patient and help assist patient in scheduling a follow up.    Ashley Morfin RN on 7/11/2022 at 2:58 PM

## 2022-07-12 RX ORDER — CITALOPRAM HYDROBROMIDE 10 MG/1
TABLET ORAL
Qty: 90 TABLET | Refills: 1 | Status: SHIPPED | OUTPATIENT
Start: 2022-07-12 | End: 2022-09-20

## 2022-08-03 ENCOUNTER — LAB REQUISITION (OUTPATIENT)
Dept: LAB | Facility: OTHER | Age: 37
End: 2022-08-03

## 2022-08-03 DIAGNOSIS — Z11.52 ENCOUNTER FOR SCREENING FOR COVID-19: ICD-10-CM

## 2022-08-03 PROCEDURE — 87637 SARSCOV2&INF A&B&RSV AMP PRB: CPT | Performed by: FAMILY MEDICINE

## 2022-09-17 ENCOUNTER — HEALTH MAINTENANCE LETTER (OUTPATIENT)
Age: 37
End: 2022-09-17

## 2022-09-20 ENCOUNTER — OFFICE VISIT (OUTPATIENT)
Dept: FAMILY MEDICINE | Facility: OTHER | Age: 37
End: 2022-09-20
Attending: FAMILY MEDICINE
Payer: COMMERCIAL

## 2022-09-20 VITALS
WEIGHT: 130.2 LBS | TEMPERATURE: 98.9 F | HEART RATE: 83 BPM | SYSTOLIC BLOOD PRESSURE: 106 MMHG | HEIGHT: 61 IN | OXYGEN SATURATION: 98 % | BODY MASS INDEX: 24.58 KG/M2 | DIASTOLIC BLOOD PRESSURE: 68 MMHG | RESPIRATION RATE: 14 BRPM

## 2022-09-20 DIAGNOSIS — Z00.00 ROUTINE HISTORY AND PHYSICAL EXAMINATION OF ADULT: Primary | ICD-10-CM

## 2022-09-20 DIAGNOSIS — Z12.4 CERVICAL CANCER SCREENING: ICD-10-CM

## 2022-09-20 DIAGNOSIS — F41.1 ANXIETY STATE: ICD-10-CM

## 2022-09-20 DIAGNOSIS — F33.0 MILD EPISODE OF RECURRENT MAJOR DEPRESSIVE DISORDER (H): ICD-10-CM

## 2022-09-20 DIAGNOSIS — M54.2 CERVICALGIA: ICD-10-CM

## 2022-09-20 DIAGNOSIS — Z13.1 DIABETES MELLITUS SCREENING: ICD-10-CM

## 2022-09-20 DIAGNOSIS — G43.909 MIGRAINE WITHOUT STATUS MIGRAINOSUS, NOT INTRACTABLE, UNSPECIFIED MIGRAINE TYPE: ICD-10-CM

## 2022-09-20 DIAGNOSIS — Z13.220 LIPID SCREENING: ICD-10-CM

## 2022-09-20 LAB
ANION GAP SERPL CALCULATED.3IONS-SCNC: 8 MMOL/L (ref 3–14)
BUN SERPL-MCNC: 11 MG/DL (ref 7–25)
CALCIUM SERPL-MCNC: 9.5 MG/DL (ref 8.6–10.3)
CHLORIDE BLD-SCNC: 106 MMOL/L (ref 98–107)
CHOLEST SERPL-MCNC: 146 MG/DL
CO2 SERPL-SCNC: 25 MMOL/L (ref 21–31)
CREAT SERPL-MCNC: 0.62 MG/DL (ref 0.6–1.2)
FASTING STATUS PATIENT QL REPORTED: NORMAL
GFR SERPL CREATININE-BSD FRML MDRD: >90 ML/MIN/1.73M2
GLUCOSE BLD-MCNC: 98 MG/DL (ref 70–105)
HDLC SERPL-MCNC: 50 MG/DL (ref 23–92)
HOLD SPECIMEN: NORMAL
LDLC SERPL CALC-MCNC: 66 MG/DL
NONHDLC SERPL-MCNC: 96 MG/DL
POTASSIUM BLD-SCNC: 4.1 MMOL/L (ref 3.5–5.1)
SODIUM SERPL-SCNC: 139 MMOL/L (ref 134–144)
TRIGL SERPL-MCNC: 148 MG/DL

## 2022-09-20 PROCEDURE — G0123 SCREEN CERV/VAG THIN LAYER: HCPCS | Performed by: FAMILY MEDICINE

## 2022-09-20 PROCEDURE — 87624 HPV HI-RISK TYP POOLED RSLT: CPT | Mod: ZL | Performed by: FAMILY MEDICINE

## 2022-09-20 PROCEDURE — 99395 PREV VISIT EST AGE 18-39: CPT | Performed by: FAMILY MEDICINE

## 2022-09-20 PROCEDURE — 80061 LIPID PANEL: CPT | Mod: ZL | Performed by: FAMILY MEDICINE

## 2022-09-20 PROCEDURE — 80048 BASIC METABOLIC PNL TOTAL CA: CPT | Mod: ZL | Performed by: FAMILY MEDICINE

## 2022-09-20 PROCEDURE — 36415 COLL VENOUS BLD VENIPUNCTURE: CPT | Mod: ZL | Performed by: FAMILY MEDICINE

## 2022-09-20 RX ORDER — CITALOPRAM HYDROBROMIDE 20 MG/1
20 TABLET ORAL DAILY
Qty: 90 TABLET | Refills: 4 | Status: SHIPPED | OUTPATIENT
Start: 2022-09-20 | End: 2023-05-09

## 2022-09-20 RX ORDER — CITALOPRAM HYDROBROMIDE 10 MG/1
10 TABLET ORAL DAILY
Qty: 90 TABLET | Refills: 3 | Status: CANCELLED | OUTPATIENT
Start: 2022-09-20

## 2022-09-20 RX ORDER — ALPRAZOLAM 0.25 MG
0.25 TABLET ORAL 2 TIMES DAILY PRN
Qty: 30 TABLET | Refills: 2 | Status: SHIPPED | OUTPATIENT
Start: 2022-09-20 | End: 2023-05-09

## 2022-09-20 ASSESSMENT — ENCOUNTER SYMPTOMS
CONSTIPATION: 1
HEADACHES: 1
NERVOUS/ANXIOUS: 1
MYALGIAS: 1
BREAST MASS: 0

## 2022-09-20 ASSESSMENT — ANXIETY QUESTIONNAIRES
6. BECOMING EASILY ANNOYED OR IRRITABLE: MORE THAN HALF THE DAYS
7. FEELING AFRAID AS IF SOMETHING AWFUL MIGHT HAPPEN: SEVERAL DAYS
8. IF YOU CHECKED OFF ANY PROBLEMS, HOW DIFFICULT HAVE THESE MADE IT FOR YOU TO DO YOUR WORK, TAKE CARE OF THINGS AT HOME, OR GET ALONG WITH OTHER PEOPLE?: VERY DIFFICULT
4. TROUBLE RELAXING: NEARLY EVERY DAY
3. WORRYING TOO MUCH ABOUT DIFFERENT THINGS: MORE THAN HALF THE DAYS
7. FEELING AFRAID AS IF SOMETHING AWFUL MIGHT HAPPEN: SEVERAL DAYS
GAD7 TOTAL SCORE: 16
1. FEELING NERVOUS, ANXIOUS, OR ON EDGE: NEARLY EVERY DAY
2. NOT BEING ABLE TO STOP OR CONTROL WORRYING: MORE THAN HALF THE DAYS
IF YOU CHECKED OFF ANY PROBLEMS ON THIS QUESTIONNAIRE, HOW DIFFICULT HAVE THESE PROBLEMS MADE IT FOR YOU TO DO YOUR WORK, TAKE CARE OF THINGS AT HOME, OR GET ALONG WITH OTHER PEOPLE: VERY DIFFICULT
GAD7 TOTAL SCORE: 16
GAD7 TOTAL SCORE: 16
5. BEING SO RESTLESS THAT IT IS HARD TO SIT STILL: NEARLY EVERY DAY

## 2022-09-20 ASSESSMENT — PAIN SCALES - GENERAL: PAINLEVEL: MILD PAIN (2)

## 2022-09-20 ASSESSMENT — PATIENT HEALTH QUESTIONNAIRE - PHQ9
SUM OF ALL RESPONSES TO PHQ QUESTIONS 1-9: 13
10. IF YOU CHECKED OFF ANY PROBLEMS, HOW DIFFICULT HAVE THESE PROBLEMS MADE IT FOR YOU TO DO YOUR WORK, TAKE CARE OF THINGS AT HOME, OR GET ALONG WITH OTHER PEOPLE: SOMEWHAT DIFFICULT
SUM OF ALL RESPONSES TO PHQ QUESTIONS 1-9: 13

## 2022-09-20 NOTE — NURSING NOTE
"Chief Complaint   Patient presents with     Physical     Patient is here for annual physical     Initial /68   Pulse 83   Temp 98.9  F (37.2  C) (Tympanic)   Resp 14   Ht 1.549 m (5' 1\")   Wt 59.1 kg (130 lb 3.2 oz)   LMP 09/14/2022   SpO2 98%   Breastfeeding No   BMI 24.60 kg/m   Estimated body mass index is 24.6 kg/m  as calculated from the following:    Height as of this encounter: 1.549 m (5' 1\").    Weight as of this encounter: 59.1 kg (130 lb 3.2 oz).  Medication Reconciliation: complete    Tracy Steinberg MA       FOOD SECURITY SCREENING QUESTIONS:    The next two questions are to help us understand your food security.  If you are feeling you need any assistance in this area, we have resources available to support you today.    Hunger Vital Signs:  Within the past 12 months we worried whether our food would run out before we got money to buy more. Never  Within the past 12 months the food we bought just didn't last and we didn't have money to get more. Never  Tracy Steinberg MA,LPN on 9/20/2022 at 2:30 PM      "

## 2022-09-20 NOTE — PROGRESS NOTES
"ANNUAL PHYSICAL - FEMALE  Family Practice    HPI: Sofya presents for a yearly exam.  Concerns include:  1. Headaches.  Week of menses is the worst.  Feels tense as well in upper back/neck - also a trigger.  Difficulty getting this to relax.  Has tried acupuncture, chiropractor (healing [facility]), massage therapy (2x/week x 2 months), muscle relaxer intermittently, heat, cold.  Currently going to bed with a hot pack on her back and a cold pack on her forehead.  Imitrex - not particularly helpful.  Has a difficult time \"catching\" her migraine; and by the time she does - she is so nauseated she can't tolerate the tablet.  ~8 migraine days per month; but more headaches as well.  Not on a preventative at this time.  2. Anxiety/mood.  Work stress is constant.  Interested in an increase in celexa dosing.  Rarely uses her xanax but likes to have available.    No LMP recorded. (Menstrual status: IUD).   Contraception: OCPs (smoking/age).  Had IUD removed for discharge on 3/22/22.  Risk for STI?: No  Last pap: 10/17/17, neg co-testing.  DUE.  Any hx of abnormal paps:  No  FH of early CA?: No  Cholesterol/DM concerns/screening: will collect today.  Tobacco?: No  Calcium intake: No  DEXA: Not yet  Last mammo: @ 42  Colon screening: @ 47  Immunizations: Tdap 9/23/20; flu yearly - at work; Shingrix @ 50; PNA @ 65/66.  Covid x2 complete; candidate for booster.    Patient Active Problem List   Diagnosis     Other acne     Anxiety state     Encounter for routine checking of intrauterine contraceptive device (IUD)     Major depression, recurrent (H)     Abdominal pain     Migraine without status migrainosus, not intractable     Allergic rhinitis       Past Medical History:   Diagnosis Date     Dysplasia of cervix uteri     cryotherapy 01/07.     Family history of other specified conditions (CODE)     following Pertussis vaccination, last seizure 1991.     Major depressive disorder, single episode     No Comments Provided     Migraine " without status migrainosus, not intractable     No Comments Provided     Other specified postprocedural states     at [facility], abnormal pap smears times 2.  Two sexual partners in her left.     Vaginal delivery            Past Surgical History:   Procedure Laterality Date     OTHER SURGICAL HISTORY      2 mo,,HERNIA REPAIR       Social History     Socioeconomic History     Marital status:      Spouse name: Luis F     Number of children: 2   Occupational History     Employer: [employer]    Tobacco Use     Smoking status: Current Every Day Smoker     Packs/day: 0.20     Types: Cigarettes     Last attempt to quit: 3/24/2018     Years since quittin.4     Smokeless tobacco: Never Used     Tobacco comment: Quit smoking: is trying, 1-2 a day   Vaping Use     Vaping Use: Never used   Substance and Sexual Activity     Alcohol use: Not Currently     Alcohol/week: 1.0 standard drink     Comment: Occ.     Drug use: Never     Sexual activity: Yes     Partners: Male     Birth control/protection: I.U.D.     Comment: Mirena placed 11/3/20   Social History Narrative    Works at [employer] and [employer]     Son Superior b     Daughter     Luis F       Family History   Problem Relation Age of Onset     Family History Negative Mother         Good Health     Family History Negative Father         Good Health     No Known Problems Brother      No Known Problems Sister      No Known Problems Son         2020       Current Outpatient Medications   Medication Sig Dispense Refill     albuterol (PROAIR HFA/PROVENTIL HFA/VENTOLIN HFA) 108 (90 Base) MCG/ACT inhaler Inhale 2 puffs into the lungs every 6 hours 18 g 0     ALPRAZolam (XANAX) 0.25 MG tablet Take 1 tablet (0.25 mg) by mouth 2 times daily as needed for anxiety 30 tablet 0     citalopram (CELEXA) 10 MG tablet TAKE 1 TABLET(10 MG) BY MOUTH DAILY 90 tablet 1     norgestimate-ethinyl estradiol (ORTHO-CYCLEN) 0.25-35 MG-MCG  tablet Take 1 tablet by mouth daily 84 tablet 1        REVIEW OF SYSTEMS:  Refer to HPI; all other systems reviewed and negative.    PHYSICAL EXAM:  There were no vitals taken for this visit.  CONSTITUTIONAL:  Alert, cooperative, NAD.  EYES: No scleral icterus.  PERRLA.  Conjunctiva clear.  ENT/MOUTH: External ears and nose normal.  TMs normal.  Moist mucous membranes. Oropharynx clear.    ENDO: No thyromegaly or thyroid nodules.  LYMPH:  No cervical or supraclavicular LA.    BREASTS: No skin abnormalities, no erythema.  No discrete masses.  No nipple discharge, no axillary, supra- or infraclavicular LA.   CARDIOVASCULAR: Regular, S1, S2.  No S3 or S4.  No murmur/gallop/rub.  No peripheral edema.  RESPIRATORY: CTA bilaterally, no wheezes, rhonchi or rales.  GI: Bowel sounds wnl.  Soft, nontender, non-distended.  No masses or HSM.  No rebound or guarding.  : Vulva: normal, no lesions or discharge  Urethral meatus: normal size and location, no lesions or discharge  Urethra: no tenderness or masses  Bladder: no fullness or tenderness  Vagina: normal appearance, no abnormal discharge, no lesions.  No evidence of cystocele or rectocele.  Cervix: normal appearance, no lesions, no abnormal discharge.  Uterus: normal size and position, mobile, non-tender  Adnexa: nopalpable masses bilaterally. No cervical motion tenderness.  Pap smear obtained: Yes  MSKEL: Grossly normal ROM.  No clubbing.  INTEGUMENTARY:Warm, dry.  No rash noted on exposed skin.  NEUROLOGIC: Facies symmetric.  Grossly normal movement and tone.  No tremor.  PSYCHIATRIC: Affect normal.  Speech fluent.      PHQ 2/25/2021 7/7/2021 12/31/2021   PHQ-9 Total Score 11 6 9   Q9: Thoughts of better off dead/self-harm past 2 weeks Several days Not at all Not at all   F/U: Thoughts of suicide or self-harm - - -   F/U: Self harm-plan - - -   F/U: Self-harm action - - -   F/U: Safety concerns - - -     JANINE-7 SCORE 2/25/2021 7/7/2021 12/31/2021   Total Score - - 19  (severe anxiety)   Total Score 19 18 19       No results found for any visits on 09/20/22.    ASSESSMENT/PLAN:  1. Routine history and physical examination of adult  - Lipid Panel; Future  - Basic Metabolic Panel; Future  - Lipid Panel  - Basic Metabolic Panel    2. Cervical cancer screening  Collected today.  - Pap Screen with HPV - recommended age 30 - 65 years  - HPV High Risk Types DNA Cervical    3. Mild episode of recurrent major depressive disorder (H)  4. Anxiety state  Chronic, waxing and waning.  Not completely controlled.  Increase celexa to 20mg daily.  Continue xanax at current dosing prn.  Refilled both at this time.  [place]  accessed and appropriate.  Consider therapy/counseling.  - citalopram (CELEXA) 20 MG tablet; Take 1 tablet (20 mg) by mouth daily  Dispense: 90 tablet; Refill: 4  - ALPRAZolam (XANAX) 0.25 MG tablet; Take 1 tablet (0.25 mg) by mouth 2 times daily as needed for anxiety  Dispense: 30 tablet; Refill: 2    5. Cervicalgia  6. Migraine without status migrainosus, not intractable, unspecified migraine type  As a trigger for migraines.  Take nightly x 2 weeks to attempt to break cycle.  Will retry abortive therapy for migraines with nasal spray at the same time.  Consideration for a daily preventative migraine medication.  - tiZANidine (ZANAFLEX) 4 MG tablet; TAKE 1/2 TO 1 TABLET(2 TO 4 MG) BY MOUTH THREE TIMES DAILY AS NEEDED FOR MUSCLE SPASMS  Dispense: 60 tablet; Refill: 2  - SUMAtriptan (IMITREX) 5 MG/ACT nasal spray; Spray 1 spray in nostril as needed for migraine May repeat in 2 hours. Max 8 sprays/24 hours.  Dispense: 1 each; Refill: 5    7. Lipid screening  Collected today.  - Lipid Panel; Future  - Lipid Panel    8. Diabetes mellitus screening  Collected today.  - Basic Metabolic Panel; Future  - Basic Metabolic Panel      Relevant cancer screening discussed.    Counseled on healthy diet, Calcium and vitamin D intake, and exercise.    Yael Whyte, [facility]    Answers for HPI/ROS submitted by the patient on 9/20/2022  If you checked off any problems, how difficult have these problems made it for you to do your work, take care of things at home, or get along with other people?: Somewhat difficult  PHQ9 TOTAL SCORE: 13  JANINE 7 TOTAL SCORE: 16  Frequency of exercise:: None  Getting at least 3 servings of Calcium per day:: Yes  Diet:: Regular (no restrictions)  Taking medications regularly:: Yes  Medication side effects:: None  Bi-annual eye exam:: NO  Dental care twice a year:: Yes  Sleep apnea or symptoms of sleep apnea:: Daytime drowsiness, Excessive snoring  constipation: Yes  nervous/anxious: Yes  headaches: Yes  myalgias: Yes  pelvic pain: No  vaginal bleeding: No  vaginal discharge: No  tenderness: No  breast mass: No  breast discharge: No  Additional concerns today:: Yes

## 2022-09-22 RX ORDER — SUMATRIPTAN 5 MG/1
1 SPRAY NASAL PRN
Qty: 1 EACH | Refills: 5 | Status: SHIPPED | OUTPATIENT
Start: 2022-09-22 | End: 2024-02-12

## 2022-09-23 LAB
BKR LAB AP GYN ADEQUACY: NORMAL
BKR LAB AP GYN INTERPRETATION: NORMAL
BKR LAB AP HPV REFLEX: NORMAL
BKR LAB AP PREVIOUS ABNORMAL: NORMAL
PATH REPORT.COMMENTS IMP SPEC: NORMAL
PATH REPORT.COMMENTS IMP SPEC: NORMAL
PATH REPORT.RELEVANT HX SPEC: NORMAL

## 2022-09-27 LAB
HUMAN PAPILLOMA VIRUS 16 DNA: NEGATIVE
HUMAN PAPILLOMA VIRUS 18 DNA: NEGATIVE
HUMAN PAPILLOMA VIRUS FINAL DIAGNOSIS: NORMAL
HUMAN PAPILLOMA VIRUS OTHER HR: NEGATIVE

## 2023-03-01 NOTE — NURSING NOTE
Chief Complaint   Patient presents with     Depression       Initial /88 (BP Location: Right arm, Patient Position: Sitting, Cuff Size: Adult Regular)  Pulse 80  Wt 140 lb 9.6 oz (63.8 kg)  BMI 27.46 kg/m2 Estimated body mass index is 27.46 kg/(m^2) as calculated from the following:    Height as of 8/25/18: 5' (1.524 m).    Weight as of this encounter: 140 lb 9.6 oz (63.8 kg).  Medication Reconciliation: complete    Maribell Benson LPN   PT. REPORTED CURRENT WEIGHT . PA SUBMITTED VIA [facility] FOR [facility].

## 2023-05-08 ENCOUNTER — E-VISIT (OUTPATIENT)
Dept: FAMILY MEDICINE | Facility: OTHER | Age: 38
End: 2023-05-08
Payer: COMMERCIAL

## 2023-05-08 DIAGNOSIS — F41.1 ANXIETY STATE: ICD-10-CM

## 2023-05-08 DIAGNOSIS — F33.0 MILD EPISODE OF RECURRENT MAJOR DEPRESSIVE DISORDER (H): ICD-10-CM

## 2023-05-08 PROCEDURE — 99421 OL DIG E/M SVC 5-10 MIN: CPT | Performed by: FAMILY MEDICINE

## 2023-05-08 ASSESSMENT — ANXIETY QUESTIONNAIRES
GAD7 TOTAL SCORE: 12
6. BECOMING EASILY ANNOYED OR IRRITABLE: NEARLY EVERY DAY
3. WORRYING TOO MUCH ABOUT DIFFERENT THINGS: NOT AT ALL
2. NOT BEING ABLE TO STOP OR CONTROL WORRYING: NOT AT ALL
GAD7 TOTAL SCORE: 12
GAD7 TOTAL SCORE: 12
1. FEELING NERVOUS, ANXIOUS, OR ON EDGE: NEARLY EVERY DAY
5. BEING SO RESTLESS THAT IT IS HARD TO SIT STILL: NEARLY EVERY DAY
8. IF YOU CHECKED OFF ANY PROBLEMS, HOW DIFFICULT HAVE THESE MADE IT FOR YOU TO DO YOUR WORK, TAKE CARE OF THINGS AT HOME, OR GET ALONG WITH OTHER PEOPLE?: EXTREMELY DIFFICULT
7. FEELING AFRAID AS IF SOMETHING AWFUL MIGHT HAPPEN: NOT AT ALL
4. TROUBLE RELAXING: NEARLY EVERY DAY
7. FEELING AFRAID AS IF SOMETHING AWFUL MIGHT HAPPEN: NOT AT ALL

## 2023-05-08 ASSESSMENT — PATIENT HEALTH QUESTIONNAIRE - PHQ9
SUM OF ALL RESPONSES TO PHQ QUESTIONS 1-9: 8
10. IF YOU CHECKED OFF ANY PROBLEMS, HOW DIFFICULT HAVE THESE PROBLEMS MADE IT FOR YOU TO DO YOUR WORK, TAKE CARE OF THINGS AT HOME, OR GET ALONG WITH OTHER PEOPLE: SOMEWHAT DIFFICULT
SUM OF ALL RESPONSES TO PHQ QUESTIONS 1-9: 8

## 2023-05-09 RX ORDER — ALPRAZOLAM 0.25 MG
0.25 TABLET ORAL 2 TIMES DAILY PRN
Qty: 30 TABLET | Refills: 2 | Status: SHIPPED | OUTPATIENT
Start: 2023-05-09 | End: 2024-03-27

## 2023-05-09 RX ORDER — CITALOPRAM HYDROBROMIDE 20 MG/1
20 TABLET ORAL DAILY
Qty: 90 TABLET | Refills: 4 | Status: SHIPPED | OUTPATIENT
Start: 2023-05-09 | End: 2024-02-12

## 2023-05-09 ASSESSMENT — PATIENT HEALTH QUESTIONNAIRE - PHQ9: SUM OF ALL RESPONSES TO PHQ QUESTIONS 1-9: 8

## 2023-05-09 ASSESSMENT — ANXIETY QUESTIONNAIRES: GAD7 TOTAL SCORE: 12

## 2023-05-09 NOTE — PATIENT INSTRUCTIONS
Depression: Tips to Help Yourself  As your healthcare providers help treat your depression, you can also help yourself. Keep in mind that your illness affects you emotionally, physically, mentally, and socially. So full recovery will take time. Take care of your body and your soul, and be patient with yourself as you get better.   Self-care    Educate yourself. Read about treatment and medicine options. If you have the energy, attend local conferences or support groups. Keep a list of useful websites and helpful books and use them as needed. This illness is not your fault. Don t blame yourself for your depression.    Manage early symptoms. If you notice symptoms returning, have triggers, or identify other factors that may lead to a depressive episode, get help as soon as possible. Ask trusted friends and family to monitor your behavior and let you know if they see anything of concern.    Work with your provider. Find a provider you can trust. Communicate honestly with that person. Share information on your treatment for depression and your reaction to medicines. You may need to try different medicines before finding the right one.    Be prepared for a crisis. Know what to do if you have a crisis. Keep the phone number of a crisis hotline handy. Know where your community's urgent care centers and the closest emergency department are.    Hold off on big decisions. Depression can cloud your judgment. So wait until you feel better before making major life decisions. These include changing jobs, moving, making an expensive purchase, or getting  or .    Be patient. Recovering from depression is a process. Don t be discouraged if it takes some time to feel better.    Keep it simple. Depression saps your energy and concentration. So you won t be able to do all the things you used to do. Set small goals and do what you can.    Be with others. Don t isolate yourself--you ll only feel worse. Try to be with  other people. And take part in fun activities when you can. Go to a movie, ballgame, [employer] service, or social event. Talk openly with people you can trust. And accept help when it s offered.    Take care of your body    People with depression often lose the desire to take care of themselves. That only makes their problems worse. During treatment and afterward, make a point to:     Exercise. It s a great way to take care of your body. And studies have shown that exercise helps fight depression. Aim for 30 minutes of moderate activity a day. Walking in small blocks of time (5-10 minutes) is a good way to start, but anything that gets you moving (gardening, house cleaning) counts.    Not use drugs or alcohol. These may ease the pain in the short term. But they ll only make your problems worse in the long run.    Get relief from stress. Ask your healthcare provider for relaxation exercises and techniques to help ease stress. Consider activities like meditation, yoga, progressive muscle relaxation, or lesly chi.    Eat right. A balanced and healthy diet helps keep your body healthy.    Get adequate sleep. Aim for 8 hours per night. Too much or too little sleep can cause other physical and emotional problems.  [employer] last reviewed this educational content on 1/1/2022 2000-2022 [employer]. All rights reserved. This information is not intended as a substitute for professional medical care. Always follow your healthcare professional's instructions.          Depression Affects Your Mind and Body  Everyone feels sad or  blue  from time to time for a few days or weeks. Depression is when these feelings don't go away and they interfere with daily life. Depression is a real illness that can develop at any age. It is one of the most common mental health problems in the [place] Depression makes you feel sad, helpless, and hopeless. It gets in the way of your life and relationships. Depression causes chemical  changes in the brain that inhibit your ability to think and act. But, with help, you can feel better again.     Depression affects your whole body  Brain chemicals affect your body as well as your mood. So depression may do more than just make you feel low. You may also feel bad physically. Depression can:     Cause trouble with mental tasks such as remembering, concentrating, or making decisions    Make you feel nervous and jumpy    Cause trouble sleeping. Or you may sleep too much.    Change your appetite    Cause headaches, stomachaches, or other aches and pains    Drain your body of energy  Depression and other illness  It is common for people who have chronic health problems to also have depression. It can often be hard to tell which one caused the other. A person might become depressed after finding out they have a health problem. But some studies suggest being depressed may make certain health problems more likely. And some depressed people stop taking care of themselves. This may make them more likely to get sick.   [employer] last reviewed this educational content on 5/1/2020 2000-2022 [employer]. All rights reserved. This information is not intended as a substitute for professional medical care. Always follow your healthcare professional's instructions.

## 2023-05-19 ENCOUNTER — VIRTUAL VISIT (OUTPATIENT)
Dept: INTERNAL MEDICINE | Facility: OTHER | Age: 38
End: 2023-05-19
Payer: COMMERCIAL

## 2023-05-19 DIAGNOSIS — L29.89 PRURITUS OF PALM: Primary | ICD-10-CM

## 2023-05-19 PROCEDURE — G0463 HOSPITAL OUTPT CLINIC VISIT: HCPCS | Mod: GT

## 2023-05-19 PROCEDURE — 99213 OFFICE O/P EST LOW 20 MIN: CPT | Mod: VID

## 2023-05-19 RX ORDER — TRIAMCINOLONE ACETONIDE 1 MG/G
OINTMENT TOPICAL
Qty: 80 G | Refills: 0 | Status: SHIPPED | OUTPATIENT
Start: 2023-05-19 | End: 2024-02-12

## 2023-05-19 ASSESSMENT — ANXIETY QUESTIONNAIRES
6. BECOMING EASILY ANNOYED OR IRRITABLE: MORE THAN HALF THE DAYS
GAD7 TOTAL SCORE: 8
7. FEELING AFRAID AS IF SOMETHING AWFUL MIGHT HAPPEN: NOT AT ALL
5. BEING SO RESTLESS THAT IT IS HARD TO SIT STILL: SEVERAL DAYS
2. NOT BEING ABLE TO STOP OR CONTROL WORRYING: SEVERAL DAYS
3. WORRYING TOO MUCH ABOUT DIFFERENT THINGS: NOT AT ALL
1. FEELING NERVOUS, ANXIOUS, OR ON EDGE: MORE THAN HALF THE DAYS
7. FEELING AFRAID AS IF SOMETHING AWFUL MIGHT HAPPEN: NOT AT ALL
GAD7 TOTAL SCORE: 8
GAD7 TOTAL SCORE: 8
4. TROUBLE RELAXING: MORE THAN HALF THE DAYS

## 2023-05-19 ASSESSMENT — PATIENT HEALTH QUESTIONNAIRE - PHQ9
SUM OF ALL RESPONSES TO PHQ QUESTIONS 1-9: 0
SUM OF ALL RESPONSES TO PHQ QUESTIONS 1-9: 0

## 2023-05-19 NOTE — PROGRESS NOTES
Sofya is a 37 year old who is being evaluated via a billable video visit.      How would you like to obtain your AVS? MyChart  If the video visit is dropped, the invitation should be resent by: Text to cell phone: [phone]  Will anyone else be joining your video visit? No          Assessment & Plan   Sofya Brown is a 37 year old presenting for the following health issues:      ICD-10-CM    1. Pruritus of palms and feet  L29.8 Hemoglobin A1c     Comprehensive Metabolic Panel     Vitamin D Total     CBC and Differential     TSH Reflex GH     triamcinolone (KENALOG) 0.1 % external ointment        At this time, uncertain to the cause of her itching. I sent in a prescription of kenalog for her to use twice a day for 2 weeks. Encouraged her to continue using benadryl for symptoms, use lotion in between steroid use. Discussed other etiologies and further workup such as diabetes, liver disease, vitamin D deficiencies that could potentially be causing the itching. Placed lab orders for these for patient to have done at her convenience. If itching continues and labwork is normal- will refer to dermatology.       No follow-ups on file.    RHONDA G[facility]mez [facility]      Subjective   Sofya is a 37 year old, presenting for the following health issues:        Derm Problem (ITCHING AND BURNING ON HANDS AND FEET. NO RASH OR DISCOLORATION.   Venecia Almeida LPN on 5/19/2023 at 2:48 PM//)         View : No data to display.              HPI     Patient presets for video visit to discuss itching of her palms and feet. This started several days ago. She reports that she has had a similar episode several months back that resolved on its own. She denies any new exposures to chemicals, plans, animals. She does not have any visible rashes. She denies any other symptoms. She has been taking benadryl and using lotion with no relief.     Review of Systems   Constitutional: Negative for fever.    Endocrine: Negative for polydipsia, polyphagia and polyuria.   Musculoskeletal: Negative for myalgias.   Skin: Negative for color change and rash.   Hematological: Does not bruise/bleed easily.   All other systems reviewed and are negative.     Constitutional, HEENT, cardiovascular, pulmonary, gi and gu systems are negative, except as otherwise noted.      Objective           Vitals:  No vitals were obtained today due to virtual visit.    Physical Exam   GENERAL: Healthy, alert and no distress  EYES: Eyes grossly normal to inspection.  No discharge or erythema, or obvious scleral/conjunctival abnormalities.  RESP: No audible wheeze, cough, or visible cyanosis.  No visible retractions or increased work of breathing.    SKIN: no suspicious lesions or rashes and itching palms during exam  NEURO: Cranial nerves grossly intact.  Mentation and speech appropriate for age.  PSYCH: Mentation appears normal, affect normal/bright, judgement and insight intact, normal speech and appearance well-groomed.                Video-Visit Details  Originating Location (pt. Location): Home    Distant Location (provider location):  On-site  Platform used for Video Visit: Zoom ([employer])

## 2023-05-20 ASSESSMENT — ENCOUNTER SYMPTOMS
FEVER: 0
POLYDIPSIA: 0
BRUISES/BLEEDS EASILY: 0
COLOR CHANGE: 0
MYALGIAS: 0
POLYPHAGIA: 0

## 2023-12-16 ENCOUNTER — HEALTH MAINTENANCE LETTER (OUTPATIENT)
Age: 38
End: 2023-12-16

## 2024-02-12 ENCOUNTER — OFFICE VISIT (OUTPATIENT)
Dept: FAMILY MEDICINE | Facility: OTHER | Age: 39
End: 2024-02-12
Payer: COMMERCIAL

## 2024-02-12 VITALS
WEIGHT: 139 LBS | TEMPERATURE: 97.3 F | OXYGEN SATURATION: 97 % | SYSTOLIC BLOOD PRESSURE: 130 MMHG | HEART RATE: 79 BPM | RESPIRATION RATE: 12 BRPM | DIASTOLIC BLOOD PRESSURE: 92 MMHG | BODY MASS INDEX: 27.29 KG/M2 | HEIGHT: 60 IN

## 2024-02-12 DIAGNOSIS — R05.1 ACUTE COUGH: ICD-10-CM

## 2024-02-12 DIAGNOSIS — J06.9 VIRAL URI WITH COUGH: Primary | ICD-10-CM

## 2024-02-12 PROCEDURE — 87637 SARSCOV2&INF A&B&RSV AMP PRB: CPT | Mod: ZL

## 2024-02-12 PROCEDURE — 99213 OFFICE O/P EST LOW 20 MIN: CPT

## 2024-02-12 ASSESSMENT — PAIN SCALES - GENERAL: PAINLEVEL: NO PAIN (1)

## 2024-02-12 NOTE — NURSING NOTE
Pt presents to  with her daughter. Pt has been closely exposed to flu this past week and is starting to have symptoms of cough and headache.    Chief Complaint   Patient presents with    Flu Symptoms       FOOD SECURITY SCREENING QUESTIONS  Hunger Vital Signs:  Within the past 12 months we worried whether our food would run out before we got money to buy more. Never  Within the past 12 months the food we bought just didn't last and we didn't have money to get more. Never  Millie Monroe 2/12/2024 9:27 AM      Initial BP (!) 130/92 (BP Location: Left arm, Patient Position: Sitting, Cuff Size: Adult Regular)   Pulse 79   Temp 97.3  F (36.3  C) (Tympanic)   Resp 12   Ht 1.524 m (5')   Wt 63 kg (139 lb)   LMP 01/17/2024 (Within Weeks)   SpO2 97%   BMI 27.15 kg/m   Estimated body mass index is 27.15 kg/m  as calculated from the following:    Height as of this encounter: 1.524 m (5').    Weight as of this encounter: 63 kg (139 lb).  Medication Reconciliation: complete    Millie Erickon

## 2024-02-12 NOTE — PROGRESS NOTES
ASSESSMENT/PLAN:    I have reviewed the nursing notes.  I have reviewed the findings, diagnosis, plan and need for follow up with the patient.    1. Viral URI with cough  2. Acute cough  - Symptomatic Influenza A/B, RSV, & SARS-CoV2 PCR (COVID-19) Nose    Patient presents with upper respiratory symptoms.  Patient's vitals are stable and she appears nontoxic.  Multiplex test was negative. Discussed with patient that symptoms and exam are consistent with viral illness.  Discussed that symptomatic treatment of cough is appropriate but not with antibiotics.  Discussed symptomatic treatment - Encouraged fluids, salt water gargles, honey (only if greater than 1 year in age due to risk of botulism), elevation, humidifier, sinus rinse/netti pot, lozenges, tea, topical vapor rub, popsicles, rest, etc. May use over-the-counter Tylenol or ibuprofen PRN.    Discussed warning signs/symptoms indicative of need to f/u    Follow up if symptoms persist or worsen or concerns    I explained my diagnostic considerations and recommendations to the patient, who voiced understanding and agreement with the treatment plan. All questions were answered. We discussed potential side effects of any prescribed or recommended therapies, as well as expectations for response to treatments.    Denzel Coronado, RHONDA CNP  2/12/2024  9:40 AM    HPI:    Sofya Brown is a 38 year old female  who [facility] for concerns of URI symptoms    URI, x 1 day    Symptoms:  No fevers or chills.   No sore throat/pharyngitis/tonsillitis.   YES: +  allergy/URI Symptoms  No muffled sounds/change in hearing  No sensation of fullness in ear(s)  No ringing in ears/tinnitus  No balance changes  No dizziness  YES: +  congestion (head/nasal/chest)  YES: +  cough/productive cough  YES: +  post nasal drip   YES: +  headache  YES: +  sinus pain/pressure  No myalgias  No otalgia  No rash  Activity Level Changes: Yes: increased fatigue   Appetite/Liquid  Intake Changes: Yes: decreased  Changes to Bowel Habits: No  Changes to Bladder Habits: No  Additional Symptoms to Report: No  History of similar symptoms: No  Prior workup: No    Treatments tried: Fluids and Rest    Site of exposure: daughter  Type of exposure: flu    Other Pertinent History: none    Allergies: reviewed    PCP: Isabell    Past Medical History:   Diagnosis Date    Dysplasia of cervix uteri     cryotherapy .    Family history of other specified conditions (CODE)     following Pertussis vaccination, last seizure .    Major depressive disorder, single episode     No Comments Provided    Migraine without status migrainosus, not intractable     No Comments Provided    Other specified postprocedural states     at [facility], abnormal pap smears times 2.  Two sexual partners in her left.    Vaginal delivery          Past Surgical History:   Procedure Laterality Date    OTHER SURGICAL HISTORY      2 mo,,HERNIA REPAIR     Social History     Tobacco Use    Smoking status: Some Days     Packs/day: .2     Types: Cigarettes     Last attempt to quit: 3/24/2018     Years since quittin.8    Smokeless tobacco: Never    Tobacco comments:     Quit smoking: is trying, 1-2 a day   Substance Use Topics    Alcohol use: Yes     Comment: Occ.     Current Outpatient Medications   Medication Sig Dispense Refill    ALPRAZolam (XANAX) 0.25 MG tablet Take 1 tablet (0.25 mg) by mouth 2 times daily as needed for anxiety 30 tablet 2    albuterol (PROAIR HFA/PROVENTIL HFA/VENTOLIN HFA) 108 (90 Base) MCG/ACT inhaler Inhale 2 puffs into the lungs every 6 hours (Patient not taking: Reported on 2023) 18 g 0    citalopram (CELEXA) 20 MG tablet Take 1 tablet (20 mg) by mouth daily (Patient not taking: Reported on 2024) 90 tablet 4    SUMAtriptan (IMITREX) 5 MG/ACT nasal spray Spray 1 spray in nostril as needed for migraine May repeat in 2 hours. Max 8 sprays/24 hours. (Patient not taking:  Reported on 5/19/2023) 1 each 5    tiZANidine (ZANAFLEX) 4 MG tablet TAKE 1/2 TO 1 TABLET(2 TO 4 MG) BY MOUTH THREE TIMES DAILY AS NEEDED FOR MUSCLE SPASMS (Patient not taking: Reported on 2/12/2024) 60 tablet 2    triamcinolone (KENALOG) 0.1 % external ointment Apply sparingly to affected area three times daily for 14 days. (Patient not taking: Reported on 2/12/2024) 80 g 0     Allergies   Allergen Reactions    Sertraline      Other reaction(s): Dizziness    Buspirone Anxiety and Other (See Comments)     Illuminating thoughts (hallucinations)    Wellbutrin [Bupropion] Anxiety     Past medical history, past surgical history, current medications and allergies reviewed and accurate to the best of my knowledge.      ROS:  Refer to HPI    BP (!) 130/92 (BP Location: Left arm, Patient Position: Sitting, Cuff Size: Adult Regular)   Pulse 79   Temp 97.3  F (36.3  C) (Tympanic)   Resp 12   Ht 1.524 m (5')   Wt 63 kg (139 lb)   LMP 01/17/2024 (Within Weeks)   SpO2 97%   BMI 27.15 kg/m      EXAM:  General Appearance: Well appearing 38 year old female, appropriate appearance for age. No acute distress   Ears: Left TM intact, translucent with bony landmarks appreciated, no erythema, no effusion, no bulging, no purulence.  Right TM intact, translucent with bony landmarks appreciated, no erythema, no effusion, no bulging, no purulence.  Left auditory canal clear.  Right auditory canal clear.  Normal external ears, non tender.  Eyes: conjunctivae normal without erythema or irritation, corneas clear, no drainage or crusting, no eyelid swelling, pupils equal   Oropharynx: moist mucous membranes, posterior pharynx without erythema, tonsils symmetric and 1+, no erythema, no exudates or petechiae, no post nasal drip seen, no trismus, voice clear.    Nose:  Bilateral nares: no erythema, no edema, no drainage or congestion   Neck: supple without adenopathy  Respiratory: normal chest wall and respirations.  Normal effort.  Clear  to auscultation bilaterally, no wheezing, crackles or rhonchi.  No increased work of breathing.  No cough appreciated.  Cardiac: RRR with no murmurs  Musculoskeletal:  Equal movement of bilateral upper extremities.  Equal movement of bilateral lower extremities.  Normal gait.    Dermatological: no rashes noted of exposed skin  Neuro: Alert and oriented to person, place, and time.    Psychological: normal affect, alert, oriented, and pleasant.     Labs:  Results for orders placed or performed in visit on 02/12/24   Symptomatic Influenza A/B, RSV, & SARS-CoV2 PCR (COVID-19) Nose     Status: Normal    Specimen: Nose; Swab   Result Value Ref Range    Influenza A PCR Negative Negative    Influenza B PCR Negative Negative    RSV PCR Negative Negative    SARS CoV2 PCR Negative Negative    Narrative    Testing was performed using the Xpert Xpress CoV2/Flu/RSV Assay on the Cepheid GeneXpert Instrument. This test should be ordered for the detection of SARS-CoV-2, influenza, and RSV viruses in individuals who meet clinical and/or epidemiological criteria. Test performance is unknown in asymptomatic patients. This test is for in vitro diagnostic use under the FDA EUA for laboratories certified under CLIA to perform high or moderate complexity testing. This test has not been FDA cleared or approved. A negative result does not rule out the presence of PCR inhibitors in the specimen or target RNA in concentration below the limit of detection for the assay. If only one viral target is positive but coinfection with multiple targets is suspected, the sample should be re-tested with another FDA cleared, approved, or authorized test, if coinfection would change clinical management. This test was validated by the [facility]. These laboratories are certified under the Clinical Laboratory Improvement Amendments of 1988 (CLIA-88) as qualified to perform high complexity laboratory testing.

## 2024-03-27 DIAGNOSIS — F41.1 ANXIETY STATE: ICD-10-CM

## 2024-03-27 RX ORDER — ALPRAZOLAM 0.25 MG
0.25 TABLET ORAL 2 TIMES DAILY PRN
Qty: 30 TABLET | Refills: 2 | Status: SHIPPED | OUTPATIENT
Start: 2024-03-27

## 2024-03-27 NOTE — TELEPHONE ENCOUNTER
Reason for call: Medication or medication refill    Name of medication requested: alprazolam    How many days of medication do you have left? ZERO    What pharmacy do you use? [facility]    Preferred method for responding to this message: Telephone Call    Phone number patient can be reached at: Cell number on file:    Telephone Information:   Mobile [phone]       If we cannot reach you directly, may we leave a detailed response at the number you provided? Yes        Patient stated the pharmacy asked her to call MAHAD Reed on 3/27/2024 at 9:59 AM

## 2024-03-27 NOTE — TELEPHONE ENCOUNTER
[id number] [place] sent Rx request for the following:      Requested Prescriptions   Pending Prescriptions Disp Refills    ALPRAZolam (XANAX) 0.25 MG tablet 30 tablet 2     Sig: Take 1 tablet (0.25 mg) by mouth 2 times daily as needed for anxiety       There is no refill protocol information for this order          Last Prescription Date:   5/9/23  Last Fill Qty/Refills:         30, R-2    Last Office Visit:              5/8/23 Virtual visit   Future Office visit:           none    Will route to unit schedulers, patient is due for annual wellness visit.    Routing refill request to provider for review/approval because:  Drug not on the [facility] refill protocol     Janice Montiel RN on 3/27/2024 at 10:04 AM

## 2024-03-27 NOTE — TELEPHONE ENCOUNTER
PDMP Review         Value Time User    State PDMP site checked  Yes 3/27/2024 12:39 PM Yael Whyte,           Rx renewed.  Yael Whyte, DO

## 2024-03-28 ENCOUNTER — OFFICE VISIT (OUTPATIENT)
Dept: FAMILY MEDICINE | Facility: OTHER | Age: 39
End: 2024-03-28
Attending: NURSE PRACTITIONER
Payer: COMMERCIAL

## 2024-03-28 DIAGNOSIS — J01.01 ACUTE RECURRENT MAXILLARY SINUSITIS: Primary | ICD-10-CM

## 2024-03-28 DIAGNOSIS — F33.9 RECURRENT MAJOR DEPRESSIVE DISORDER, REMISSION STATUS UNSPECIFIED (H): ICD-10-CM

## 2024-03-28 PROCEDURE — 99213 OFFICE O/P EST LOW 20 MIN: CPT | Performed by: NURSE PRACTITIONER

## 2024-03-28 RX ORDER — CITALOPRAM HYDROBROMIDE 20 MG/1
20 TABLET ORAL DAILY
Qty: 90 TABLET | Refills: 1 | Status: SHIPPED | OUTPATIENT
Start: 2024-03-28 | End: 2024-04-18

## 2024-03-28 ASSESSMENT — PATIENT HEALTH QUESTIONNAIRE - PHQ9
SUM OF ALL RESPONSES TO PHQ QUESTIONS 1-9: 6
10. IF YOU CHECKED OFF ANY PROBLEMS, HOW DIFFICULT HAVE THESE PROBLEMS MADE IT FOR YOU TO DO YOUR WORK, TAKE CARE OF THINGS AT HOME, OR GET ALONG WITH OTHER PEOPLE: NOT DIFFICULT AT ALL
SUM OF ALL RESPONSES TO PHQ QUESTIONS 1-9: 6

## 2024-03-28 NOTE — NURSING NOTE
Patient presents today for nasal congestion over the last week. Patient complains over the last 2 days her sinuses have become more painful and she has been having headaches.    Medication Reconciliation Complete    Yanira Garrett LPN  3/28/2024 9:37 AM

## 2024-03-28 NOTE — PROGRESS NOTES
Assessment & Plan   Problem List Items Addressed This Visit          Behavioral    Major depression, recurrent (H24)    Relevant Medications    citalopram (CELEXA) 20 MG tablet     Other Visit Diagnoses       Acute recurrent maxillary sinusitis    -  Primary    Relevant Medications    amoxicillin-clavulanate (AUGMENTIN) 875-125 MG tablet        She is requesting a refill of citalopram.  This inadvertently was marked as no longer taking in a recent [facility] visit which made future refills void.  Citalopram ordered per request, depression is stable.    Acute maxillary sinusitis, treated with Augmentin twice daily for 10 days.  Discussed symptomatic management as well as signs and symptoms that would warrant follow-up.    Prescription drug management       No follow-ups on file.      Subjective   Sofya is a 38 year old, presenting for the following health issues:  Nasal Congestion    History of Present Illness       Reason for visit:  Constant cold/sinus infection  Symptom onset:  More than a month  Symptoms include:  Pain in face,sinus drainage  Symptom intensity:  Severe  Symptom progression:  Worsening  Had these symptoms before:  Yes  Has tried/received treatment for these symptoms:  No  What makes it better:  Cold and heat    She eats 0-1 servings of fruits and vegetables daily.She consumes 0 sweetened beverage(s) daily.She exercises with enough effort to increase her heart rate 30 to 60 minutes per day.  She exercises with enough effort to increase her heart rate 5 days per week. She is missing 1 dose(s) of medications per week.     She comes in today with concerns of upper respiratory symptoms.  She reports she has had upper respiratory symptoms for over a month.  She started to feel better for couple days and on Tuesday symptoms progressively worsened.  She is having increased pain and swelling to the right side of her face, increased nasal drainage.  Denies any fevers.  Has been using over-the-counter  medications for symptomatic management.        Review of Systems  See above      Objective    LMP 01/17/2024 (Within Weeks)   There is no height or weight on file to calculate BMI.  Physical Exam   GENERAL: alert and no distress  EYES: Eyes grossly normal to inspection, PERRL and conjunctivae and sclerae normal  HENT: ear canals and TM's normal, nose and mouth without ulcers or lesions.  Bilateral turbinate swollen and erythematous.  Tender over right maxillary sinus with palpation.  NECK: no adenopathy, no asymmetry, masses, or scars  RESP: lungs clear to auscultation - no rales, rhonchi or wheezes  CV: regular rate and rhythm, normal S1 S2  PSYCH: mentation appears normal, affect normal/bright            Signed Electronically by: RHONDA Vargas CNP

## 2024-04-18 ENCOUNTER — OFFICE VISIT (OUTPATIENT)
Dept: FAMILY MEDICINE | Facility: OTHER | Age: 39
End: 2024-04-18
Attending: NURSE PRACTITIONER
Payer: COMMERCIAL

## 2024-04-18 VITALS
DIASTOLIC BLOOD PRESSURE: 76 MMHG | TEMPERATURE: 98.3 F | HEART RATE: 74 BPM | RESPIRATION RATE: 16 BRPM | SYSTOLIC BLOOD PRESSURE: 110 MMHG | BODY MASS INDEX: 26.7 KG/M2 | HEIGHT: 61 IN | OXYGEN SATURATION: 97 % | WEIGHT: 141.4 LBS

## 2024-04-18 DIAGNOSIS — Z13.1 SCREENING FOR DIABETES MELLITUS: ICD-10-CM

## 2024-04-18 DIAGNOSIS — M54.2 NECK PAIN: ICD-10-CM

## 2024-04-18 DIAGNOSIS — Z13.21 ENCOUNTER FOR VITAMIN DEFICIENCY SCREENING: ICD-10-CM

## 2024-04-18 DIAGNOSIS — Z00.00 ROUTINE GENERAL MEDICAL EXAMINATION AT A HEALTH CARE FACILITY: Primary | ICD-10-CM

## 2024-04-18 DIAGNOSIS — E66.3 OVERWEIGHT (BMI 25.0-29.9): ICD-10-CM

## 2024-04-18 DIAGNOSIS — Z13.29 SCREENING FOR THYROID DISORDER: ICD-10-CM

## 2024-04-18 DIAGNOSIS — F33.9 RECURRENT MAJOR DEPRESSIVE DISORDER, REMISSION STATUS UNSPECIFIED (H): ICD-10-CM

## 2024-04-18 LAB
ALBUMIN SERPL BCG-MCNC: 4.8 G/DL (ref 3.5–5.2)
ALP SERPL-CCNC: 71 U/L (ref 40–150)
ALT SERPL W P-5'-P-CCNC: 12 U/L (ref 0–50)
ANION GAP SERPL CALCULATED.3IONS-SCNC: 12 MMOL/L (ref 7–15)
AST SERPL W P-5'-P-CCNC: 16 U/L (ref 0–45)
BASOPHILS # BLD AUTO: 0 10E3/UL (ref 0–0.2)
BASOPHILS NFR BLD AUTO: 1 %
BILIRUB SERPL-MCNC: 0.5 MG/DL
BUN SERPL-MCNC: 10.2 MG/DL (ref 6–20)
CALCIUM SERPL-MCNC: 9.9 MG/DL (ref 8.6–10)
CHLORIDE SERPL-SCNC: 103 MMOL/L (ref 98–107)
CREAT SERPL-MCNC: 0.67 MG/DL (ref 0.51–0.95)
DEPRECATED HCO3 PLAS-SCNC: 24 MMOL/L (ref 22–29)
EGFRCR SERPLBLD CKD-EPI 2021: >90 ML/MIN/1.73M2
EOSINOPHIL # BLD AUTO: 0.2 10E3/UL (ref 0–0.7)
EOSINOPHIL NFR BLD AUTO: 3 %
ERYTHROCYTE [DISTWIDTH] IN BLOOD BY AUTOMATED COUNT: 12.3 % (ref 10–15)
GLUCOSE SERPL-MCNC: 90 MG/DL (ref 70–99)
HBA1C MFR BLD: 5 % (ref 4–6.2)
HCT VFR BLD AUTO: 43.6 % (ref 35–47)
HGB BLD-MCNC: 14.6 G/DL (ref 11.7–15.7)
IMM GRANULOCYTES # BLD: 0 10E3/UL
IMM GRANULOCYTES NFR BLD: 0 %
LYMPHOCYTES # BLD AUTO: 1.4 10E3/UL (ref 0.8–5.3)
LYMPHOCYTES NFR BLD AUTO: 23 %
MCH RBC QN AUTO: 30.3 PG (ref 26.5–33)
MCHC RBC AUTO-ENTMCNC: 33.5 G/DL (ref 31.5–36.5)
MCV RBC AUTO: 91 FL (ref 78–100)
MONOCYTES # BLD AUTO: 0.5 10E3/UL (ref 0–1.3)
MONOCYTES NFR BLD AUTO: 7 %
NEUTROPHILS # BLD AUTO: 4.3 10E3/UL (ref 1.6–8.3)
NEUTROPHILS NFR BLD AUTO: 67 %
NRBC # BLD AUTO: 0 10E3/UL
NRBC BLD AUTO-RTO: 0 /100
PLATELET # BLD AUTO: 245 10E3/UL (ref 150–450)
POTASSIUM SERPL-SCNC: 4.1 MMOL/L (ref 3.4–5.3)
PROT SERPL-MCNC: 7.3 G/DL (ref 6.4–8.3)
RBC # BLD AUTO: 4.82 10E6/UL (ref 3.8–5.2)
SODIUM SERPL-SCNC: 139 MMOL/L (ref 135–145)
TSH SERPL DL<=0.005 MIU/L-ACNC: 1.01 UIU/ML (ref 0.3–4.2)
WBC # BLD AUTO: 6.4 10E3/UL (ref 4–11)

## 2024-04-18 PROCEDURE — 36415 COLL VENOUS BLD VENIPUNCTURE: CPT | Mod: ZL | Performed by: NURSE PRACTITIONER

## 2024-04-18 PROCEDURE — 85025 COMPLETE CBC W/AUTO DIFF WBC: CPT | Mod: ZL | Performed by: NURSE PRACTITIONER

## 2024-04-18 PROCEDURE — 83036 HEMOGLOBIN GLYCOSYLATED A1C: CPT | Mod: ZL | Performed by: NURSE PRACTITIONER

## 2024-04-18 PROCEDURE — 99395 PREV VISIT EST AGE 18-39: CPT | Performed by: NURSE PRACTITIONER

## 2024-04-18 PROCEDURE — 84443 ASSAY THYROID STIM HORMONE: CPT | Mod: ZL | Performed by: NURSE PRACTITIONER

## 2024-04-18 PROCEDURE — 99213 OFFICE O/P EST LOW 20 MIN: CPT | Mod: 25 | Performed by: NURSE PRACTITIONER

## 2024-04-18 PROCEDURE — 82306 VITAMIN D 25 HYDROXY: CPT | Mod: ZL | Performed by: NURSE PRACTITIONER

## 2024-04-18 PROCEDURE — 80053 COMPREHEN METABOLIC PANEL: CPT | Mod: ZL | Performed by: NURSE PRACTITIONER

## 2024-04-18 RX ORDER — CITALOPRAM HYDROBROMIDE 20 MG/1
20 TABLET ORAL DAILY
Qty: 90 TABLET | Refills: 4 | Status: SHIPPED | OUTPATIENT
Start: 2024-04-18

## 2024-04-18 SDOH — HEALTH STABILITY: PHYSICAL HEALTH: ON AVERAGE, HOW MANY DAYS PER WEEK DO YOU ENGAGE IN MODERATE TO STRENUOUS EXERCISE (LIKE A BRISK WALK)?: 5 DAYS

## 2024-04-18 ASSESSMENT — PATIENT HEALTH QUESTIONNAIRE - PHQ9
SUM OF ALL RESPONSES TO PHQ QUESTIONS 1-9: 9
10. IF YOU CHECKED OFF ANY PROBLEMS, HOW DIFFICULT HAVE THESE PROBLEMS MADE IT FOR YOU TO DO YOUR WORK, TAKE CARE OF THINGS AT HOME, OR GET ALONG WITH OTHER PEOPLE: SOMEWHAT DIFFICULT
SUM OF ALL RESPONSES TO PHQ QUESTIONS 1-9: 9

## 2024-04-18 ASSESSMENT — SOCIAL DETERMINANTS OF HEALTH (SDOH): HOW OFTEN DO YOU GET TOGETHER WITH FRIENDS OR RELATIVES?: ONCE A WEEK

## 2024-04-18 ASSESSMENT — PAIN SCALES - GENERAL: PAINLEVEL: MILD PAIN (3)

## 2024-04-18 NOTE — PROGRESS NOTES
"Preventive Care Visit  [facility] AND [facility]  Rosa Fraser, APRN CNP, Nurse Practitioner - Family  Apr 18, 2024      Assessment & Plan   Problem List Items Addressed This Visit          Behavioral    Major depression, recurrent (H24)    Relevant Medications    citalopram (CELEXA) 20 MG tablet     Other Visit Diagnoses       Routine general medical examination at a health care facility    -  Primary    Overweight (BMI 25.0-29.9)        Relevant Orders    CBC and Differential (Completed)    Comprehensive Metabolic Panel (Completed)    Hemoglobin A1c (Completed)    Nutrition Referral    Screening for thyroid disorder        Relevant Orders    TSH Reflex GH (Completed)    Encounter for vitamin deficiency screening        Relevant Orders    Vitamin D Total    Screening for diabetes mellitus        Relevant Orders    Hemoglobin A1c (Completed)    Neck pain        Relevant Orders    Physical Therapy  Referral        Concerns regarding her weight, currently her BMI is 36.5.  She is palpable nodules such as diabetes.  Screening labs TSH is elevated.  Labs are stable, vitamin D pending.  Discussed that she will need candidate for GLP-1 medications at this time.  Recommended potential for further discussion.    Mental health is currently stable.  Refilled citalopram x 1 year.  She does have prescription for alprazolam, rare use.    Neck pain with bilateral conservative treatment.  Referral to physical therapy placed.    She is up-to-date on other preventative healthcare needs at this time.    Patient has been advised of split billing requirements and indicates understanding: Yes  Review of the result(s) of each unique test - as above  Ordering of each unique test  Prescription drug management       BMI  Estimated body mass index is 26.5 kg/m  as calculated from the following:    Height as of this encounter: 1.556 m (5' 1.25\").    Weight as of this encounter: 64.1 kg (141 lb 6.4 oz).   Weight management " plan: Discussed healthy diet and exercise guidelines    Counseling  Appropriate preventive services were discussed with this patient, including applicable screening as appropriate for fall prevention, nutrition, physical activity, Tobacco-use cessation, weight loss and cognition.  Checklist reviewing preventive services available has been given to the patient.  Reviewed patient's diet, addressing concerns and/or questions.   She is at risk for psychosocial distress and has been provided with information to reduce risk.   The patient's PHQ-9 score is consistent with mild depression. She was provided with information regarding depression.         Return in about 53 weeks (around 4/24/2025) for Annual Wellness Visit.      Jackie Cowart is a 38 year old, presenting for the following:  Physical         Health Care Directive  Patient does not have a Health Care Directive or Living Will:     HPI    She presents to clinic today for annual wellness exam.  She is having some chronic shoulder tension.  Reports this has been getting worse, getting some pins-and-needles across her upper back.  She does do stretches, heat, ice, massage and chiropractic care.  Has used muscle relaxers in the past, usually only uses these if she is developing a migraine.  No head or neck injuries.    She is asking about medication such as GLP-1 for weight loss.  She reports since she delivered her son 3 and half years ago she is having difficulties losing weight.  She does do routine exercise, cutting down carbs and making dietary changes.  She does not check calories.  Currently her weight is 141, goal to be 120-130.  Does report mom with thyroid disease.  No immediate family history of type 2 diabetes.  She does take vitamin D and K2 for supplements.        4/18/2024   General Health   How would you rate your overall physical health? Good   Feel stress (tense, anxious, or unable to sleep) Only a little   (!) STRESS CONCERN      4/18/2024    Nutrition   Three or more servings of calcium each day? Yes   Diet: Regular (no restrictions)   How many servings of fruit and vegetables per day? (!) 2-3   How many sweetened beverages each day? 0-1         2024   Exercise   Days per week of moderate/strenous exercise 5 days         2024   Social Factors   Frequency of gathering with friends or relatives Once a week   Worry food won't last until get money to buy more No   Food not last or not have enough money for food? No   Do you have housing?  Yes   Are you worried about losing your housing? No   Lack of transportation? No   Unable to get utilities (heat,electricity)? No         2024   Dental   Dentist two times every year? Yes         2024   TB Screening   Were you born outside of the [place]? No       Today's PHQ-9 Score:       2024     7:55 AM   PHQ-9 SCORE   PHQ-9 Total Score MyChart 9 (Mild depression)   PHQ-9 Total Score 9         2024   Substance Use   Alcohol more than 3/day or more than 7/wk No   Do you use any other substances recreationally? No     Social History     Tobacco Use    Smoking status: Former     Current packs/day: 0.00     Types: Cigarettes     Quit date: 3/24/2018     Years since quittin.0    Smokeless tobacco: Never    Tobacco comments:     Quit smoking: is trying, 1-2 a day   Vaping Use    Vaping status: Never Used   Substance Use Topics    Alcohol use: Yes     Comment: Occ.    Drug use: Never             2024   Breast Cancer Screening   Family history of breast, colon, or ovarian cancer? No / Unknown              2024   STI Screening   New sexual partner(s) since last STI/HIV test? No     History of abnormal Pap smear: NO - age 30-65 PAP every 5 years with negative HPV co-testing recommended        Latest Ref Rng & Units 2022     2:54 PM   PAP / HPV   PAP  Negative for Intraepithelial Lesion or Malignancy (NILM)    HPV 16 DNA Negative Negative    HPV 18 DNA Negative Negative    Other HR  HPV Negative Negative            2024   Contraception/Family Planning   Questions about contraception or family planning No        Reviewed and updated as needed this visit by Provider                    Past Medical History:   Diagnosis Date    Dysplasia of cervix uteri     cryotherapy .    Family history of other specified conditions (CODE)     following Pertussis vaccination, last seizure .    Major depressive disorder, single episode     No Comments Provided    Migraine without status migrainosus, not intractable     No Comments Provided    Other specified postprocedural states     at [facility], abnormal pap smears times 2.  Two sexual partners in her left.    Vaginal delivery          Past Surgical History:   Procedure Laterality Date    OTHER SURGICAL HISTORY      2 mo,,HERNIA REPAIR     Patient Active Problem List   Diagnosis    Other acne    Anxiety state    Encounter for routine checking of intrauterine contraceptive device (IUD)    Major depression, recurrent (H24)    Abdominal pain    Migraine without status migrainosus, not intractable    Allergic rhinitis     Past Surgical History:   Procedure Laterality Date    OTHER SURGICAL HISTORY      2 mo,,HERNIA REPAIR       Social History     Tobacco Use    Smoking status: Former     Current packs/day: 0.00     Types: Cigarettes     Quit date: 3/24/2018     Years since quittin.0    Smokeless tobacco: Never    Tobacco comments:     Quit smoking: is trying, 1-2 a day   Substance Use Topics    Alcohol use: Yes     Comment: Occ.     Family History   Problem Relation Age of Onset    Family History Negative Mother         Good Health    Other - See Comments Father         found bicuspid valve; found on dizziness/lightheaded    No Known Problems Sister     No Known Problems Brother     Prostate Cancer Maternal Grandfather     No Known Problems Son                  Current Outpatient Medications   Medication Sig  "Dispense Refill    ALPRAZolam (XANAX) 0.25 MG tablet Take 1 tablet (0.25 mg) by mouth 2 times daily as needed for anxiety 30 tablet 2    citalopram (CELEXA) 20 MG tablet Take 1 tablet (20 mg) by mouth daily 90 tablet 4     Allergies   Allergen Reactions    Sertraline      Other reaction(s): Dizziness    Buspirone Anxiety and Other (See Comments)     Illuminating thoughts (hallucinations)    Wellbutrin [Bupropion] Anxiety            Objective    Exam  /76   Pulse 74   Temp 98.3  F (36.8  C)   Resp 16   Ht 1.556 m (5' 1.25\")   Wt 64.1 kg (141 lb 6.4 oz)   LMP  (LMP Unknown)   SpO2 97%   BMI 26.50 kg/m     Estimated body mass index is 26.5 kg/m  as calculated from the following:    Height as of this encounter: 1.556 m (5' 1.25\").    Weight as of this encounter: 64.1 kg (141 lb 6.4 oz).    Physical Exam  GENERAL: alert and no distress  EYES: Eyes grossly normal to inspection, PERRL and conjunctivae and sclerae normal  HENT: ear canals and TM's normal, nose and mouth without ulcers or lesions  NECK: no adenopathy, no asymmetry, masses, or scars  RESP: lungs clear to auscultation - no rales, rhonchi or wheezes  CV: regular rate and rhythm, normal S1 S2, no S3 or S4, no murmur, click or rub, no peripheral edema  ABDOMEN: soft, nontender, no hepatosplenomegaly, no masses and bowel sounds normal  MS: no gross musculoskeletal defects noted, no edema  SKIN: no suspicious lesions or rashes  NEURO: Normal strength and tone, mentation intact and speech normal  PSYCH: mentation appears normal, affect normal/bright        Signed Electronically by: RHONDA Vargas CNP    "

## 2024-04-18 NOTE — NURSING NOTE
Patient presents today for annual physical.    Medication Reconciliation Complete    Yanira Garrett LPN  4/18/2024 8:15 AM

## 2024-04-18 NOTE — PATIENT INSTRUCTIONS
Preventive Care Advice   This is general advice given by our system to help you stay healthy. However, your care team may have specific advice just for you. Please talk to your care team about your preventive care needs.  Nutrition  Eat 5 or more servings of fruits and vegetables each day.  Try wheat bread, brown rice and whole grain pasta (instead of white bread, rice, and pasta).  Get enough calcium and vitamin D. Check the label on foods and aim for 100% of the RDA (recommended daily allowance).  Lifestyle  Exercise at least 150 minutes each week   (30 minutes a day, 5 days a week).  Do muscle strengthening activities 2 days a week. These help control your weight and prevent disease.  No smoking.  Wear sunscreen to prevent skin cancer.  Have a dental exam and cleaning every 6 months.  Yearly exams  See your health care team every year to talk about:  Any changes in your health.  Any medicines your care team has prescribed.  Preventive care, family planning, and ways to prevent chronic diseases.  Shots (vaccines)   HPV shots (up to age 26), if you've never had them before.  Hepatitis B shots (up to age 59), if you've never had them before.  COVID-19 shot: Get this shot when it's due.  Flu shot: Get a flu shot every year.  Tetanus shot: Get a tetanus shot every 10 years.  Pneumococcal, hepatitis A, and RSV shots: Ask your care team if you need these based on your risk.  Shingles shot (for age 50 and up).  General health tests  Diabetes screening:  Starting at age 35, Get screened for diabetes at least every 3 years.  If you are younger than age 35, ask your care team if you should be screened for diabetes.  Cholesterol test: At age 39, start having a cholesterol test every 5 years, or more often if advised.  Bone density scan (DEXA): At age 50, ask your care team if you should have this scan for osteoporosis (brittle bones).  Hepatitis C: Get tested at least once in your life.  STIs (sexually transmitted  infections)  Before age 24: Ask your care team if you should be screened for STIs.  After age 24: Get screened for STIs if you're at risk. You are at risk for STIs (including HIV) if:  You are sexually active with more than one person.  You don't use condoms every time.  You or a partner was diagnosed with a sexually transmitted infection.  If you are at risk for HIV, ask about PrEP medicine to prevent HIV.  Get tested for HIV at least once in your life, whether you are at risk for HIV or not.  Cancer screening tests  Cervical cancer screening: If you have a cervix, begin getting regular cervical cancer screening tests at age 21. Most people who have regular screenings with normal results can stop after age 65. Talk about this with your provider.  Breast cancer scan (mammogram): If you've ever had breasts, begin having regular mammograms starting at age 40. This is a scan to check for breast cancer.  Colon cancer screening: It is important to start screening for colon cancer at age 45.  Have a colonoscopy test every 10 years (or more often if you're at risk) Or, ask your provider about stool tests like a FIT test every year or Cologuard test every 3 years.  To learn more about your testing options, visit: https://www.Box[employer]/072312.pdf.  For help making a decision, visit: https://bit.ly/nc87545.  Prostate cancer screening test: If you have a prostate and are age 55 to 69, ask your provider if you would benefit from a yearly prostate cancer screening test.  Lung cancer screening: If you are a current or former smoker age 50 to 80, ask your care team if ongoing lung cancer screenings are right for you.  For informational purposes only. Not to replace the advice of your health care provider. Copyright   2023 [employer]. All rights reserved. Clinically reviewed by the [facility]. "[employer] 492170 - REV 01/24.    Learning About Stress  What is stress?     Stress is your  body's response to a hard situation. Your body can have a physical, emotional, or mental response. Stress is a fact of life for most people, and it affects everyone differently. What causes stress for you may not be stressful for someone else.  A lot of things can cause stress. You may feel stress when you go on a job interview, take a test, or run a race. This kind of short-term stress is normal and even useful. It can help you if you need to work hard or react quickly. For example, stress can help you finish an important job on time.  Long-term stress is caused by ongoing stressful situations or events. Examples of long-term stress include long-term health problems, ongoing problems at work, or conflicts in your family. Long-term stress can harm your health.  How does stress affect your health?  When you are stressed, your body responds as though you are in danger. It makes hormones that speed up your heart, make you breathe faster, and give you a burst of energy. This is called the fight-or-flight stress response. If the stress is over quickly, your body goes back to normal and no harm is done.  But if stress happens too often or lasts too long, it can have bad effects. Long-term stress can make you more likely to get sick, and it can make symptoms of some diseases worse. If you tense up when you are stressed, you may develop neck, shoulder, or low back pain. Stress is linked to high blood pressure and heart disease.  Stress also harms your emotional health. It can make you coronel, tense, or depressed. Your relationships may suffer, and you may not do well at work or school.  What can you do to manage stress?  You can try these things to help manage stress:   Do something active. Exercise or activity can help reduce stress. Walking is a great way to get started. Even everyday activities such as housecleaning or yard work can help.  Try yoga or lesly chi. These techniques combine exercise and meditation. You may need  some training at first to learn them.  Do something you enjoy. For example, listen to music or go to a movie. Practice your hobby or do volunteer work.  Meditate. This can help you relax, because you are not worrying about what happened before or what may happen in the future.  Do guided imagery. Imagine yourself in any setting that helps you feel calm. You can use online videos, books, or a teacher to guide you.  Do breathing exercises. For example:  From a standing position, bend forward from the waist with your knees slightly bent. Let your arms dangle close to the floor.  Breathe in slowly and deeply as you return to a standing position. Roll up slowly and lift your head last.  Hold your breath for just a few seconds in the standing position.  Breathe out slowly and bend forward from the waist.  Let your feelings out. Talk, laugh, cry, and express anger when you need to. Talking with supportive friends or family, a counselor, or a roberto leader about your feelings is a healthy way to relieve stress. Avoid discussing your feelings with people who make you feel worse.  Write. It may help to write about things that are bothering you. This helps you find out how much stress you feel and what is causing it. When you know this, you can find better ways to cope.  What can you do to prevent stress?  You might try some of these things to help prevent stress:  Manage your time. This helps you find time to do the things you want and need to do.  Get enough sleep. Your body recovers from the stresses of the day while you are sleeping.  Get support. Your family, friends, and community can make a difference in how you experience stress.  Limit your news feed. Avoid or limit time on social media or news that may make you feel stressed.  Do something active. Exercise or activity can help reduce stress. Walking is a great way to get started.  Where can you learn more?  Go to https://www.healthwise.net/patiented  Enter N032 in the  "search box to learn more about \"Learning About Stress.\"  Current as of: October 24, 2023               Content Version: 14.0    1717-0552 [employer].   Care instructions adapted under license by your healthcare professional. If you have questions about a medical condition or this instruction, always ask your healthcare professional. [employer] disclaims any warranty or liability for your use of this information.      Learning About Depression Screening  What is depression screening?  Depression screening is a way to see if you have depression symptoms. It may be done by a doctor or counselor. It's often part of a routine checkup. That's because your mental health is just as important as your physical health.  Depression is a mental health condition that affects how you feel, think, and act. You may:  Have less energy.  Lose interest in your daily activities.  Feel sad and grouchy for a long time.  Depression is very common. It affects people of all ages.  Many things can lead to depression. Some people become depressed after they have a stroke or find out they have a major illness like cancer or heart disease. The death of a loved one or a breakup may lead to depression. It can run in families. Most experts believe that a combination of inherited genes and stressful life events can cause it.  What happens during screening?  You may be asked to fill out a form about your depression symptoms. You and the doctor will discuss your answers. The doctor may ask you more questions to learn more about how you think, act, and feel.  What happens after screening?  If you have symptoms of depression, your doctor will talk to you about your options.  Doctors usually treat depression with medicines or counseling. Often, combining the two works best. Many people don't get help because they think that they'll get over the depression on their own. But people with depression may not get better unless they " "get treatment.  The cause of depression is not well understood. There may be many factors involved. But if you have depression, it's not your fault.  A serious symptom of depression is thinking about death or suicide. If you or someone you care about talks about this or about feeling hopeless, get help right away.  It's important to know that depression can be treated. Medicine, counseling, and self-care may help.  Where can you learn more?  Go to https://www.[employer].net/patiented  Enter T185 in the search box to learn more about \"Learning About Depression Screening.\"  Current as of: June 24, 2023               Content Version: 14.0    8088-8258 [employer].   Care instructions adapted under license by your healthcare professional. If you have questions about a medical condition or this instruction, always ask your healthcare professional. [employer] disclaims any warranty or liability for your use of this information.      "

## 2024-04-20 LAB — VIT D+METAB SERPL-MCNC: 88 NG/ML (ref 20–50)

## 2024-06-03 ENCOUNTER — THERAPY VISIT (OUTPATIENT)
Dept: PHYSICAL THERAPY | Facility: OTHER | Age: 39
End: 2024-06-03
Attending: NURSE PRACTITIONER
Payer: COMMERCIAL

## 2024-06-03 DIAGNOSIS — M54.2 NECK PAIN: ICD-10-CM

## 2024-06-03 PROCEDURE — 97110 THERAPEUTIC EXERCISES: CPT | Mod: GP

## 2024-06-03 PROCEDURE — 97140 MANUAL THERAPY 1/> REGIONS: CPT | Mod: GP

## 2024-06-03 PROCEDURE — 97161 PT EVAL LOW COMPLEX 20 MIN: CPT | Mod: GP

## 2024-06-03 NOTE — PROGRESS NOTES
PHYSICAL THERAPY EVALUATION  Type of Visit: Evaluation    See electronic medical record for Abuse and Falls Screening details.    Subjective       Presenting condition or subjective complaint: neck and shoulder pain  Date of onset:  (patient reports pain has been ongoing for several years)    Relevant medical history: Depression; Seizures; Smoking     Prior therapy history for the same diagnosis, illness or injury: No      Prior Level of Function  Transfers: Independent  Ambulation: Independent  ADL: Independent    Living Environment  Social support: With family members       Employment: Yes      Pain assessment: Pain present  See objective evaluation for additional pain details     Objective   SHOULDER EVALUATION  PAIN: Pain Level at Rest: 1/10  Pain Level with Use: 10/10  Pain Quality: Aching, Dull, Sharp, Shooting, and Stabbing  Pain Frequency: constant  Pain is Relieved By: cold, heat, rest, and muscle relaxer  POSTURE: WFL; scapular depression bilaterally  ROM: AROM WNL  STRENGTH:   Pain: - none + mild ++ moderate +++ severe  Strength Scale: 0-5/5 Left Right   Shoulder Flexion 4+ 4+   Shoulder Extension 3+ 3+   Shoulder Abduction 4+ 4   Shoulder Internal Rotation 4+ 4+   Shoulder External Rotation 4+ 4   Lower Trap 3 3   Rhomboid 3 3     CERVICAL SCREEN:   ROM: WFL, noted sensation of tightness with extension, L sidebend   Left Right   Cervical Flexion-Rotation Negative  Negative    Compression Negative  Negative    Distraction Negative  Negative    Spurling s Negative  Negative        Assessment & Plan   CLINICAL IMPRESSIONS  Medical Diagnosis: Neck pain    Treatment Diagnosis: neck pain with mobility deficits, bilateral shoulder pain with strength and motor control deficits   Impression/Assessment: Patient is a 38 year old female with neck and bilateral shoulder complaints.  The following significant findings have been identified: Pain, Decreased strength, Impaired sensation, Impaired muscle performance, and  Decreased activity tolerance. These impairments interfere with their ability to perform self care tasks, work tasks, recreational activities, and household chores as compared to previous level of function.     Clinical Decision Making (Complexity):  Clinical Presentation: Stable/Uncomplicated  Clinical Presentation Rationale: based on medical and personal factors listed in PT evaluation  Clinical Decision Making (Complexity): Low complexity    PLAN OF CARE  Treatment Interventions:  Modalities: Cryotherapy, E-stim, Hot Pack, Mechanical Traction, Ultrasound, Vasoneumatic Device  Interventions: Manual Therapy, Neuromuscular Re-education, Therapeutic Activity, Therapeutic Exercise    Long Term Goals     PT Goal 1  Goal Identifier: pain  Goal Description: patient will report 25% reduction of shoulder and neck pain with daily activities  Rationale: to maximize safety and independence with performance of ADLs and functional tasks;to maximize safety and independence within the home;to maximize safety and independence with self cares;to maximize safety and independence within the community  Target Date: 07/16/24  PT Goal 2  Goal Identifier: self-care tasks  Goal Description: patient will be able to complete self-care tasks including washing and styling hair with <2/10 pain  Rationale: to maximize safety and independence with performance of ADLs and functional tasks;to maximize safety and independence with self cares;to maximize safety and independence within the home  Target Date: 07/30/24  PT Goal 3  Goal Identifier: strength  Goal Description: patient will demonstrate 4+/5 gross bilateral shouldr strength  Rationale: to maximize safety and independence with performance of ADLs and functional tasks;to maximize safety and independence within the home;to maximize safety and independence within the community;to maximize safety and independence with self cares  Target Date: 08/27/24      Frequency of Treatment:  1-2x/week  Duration of Treatment: 8 weeks    Education Assessment:   Learner/Method: Patient;Listening;Demonstration;Pictures/Video;No Barriers to Learning    Risks and benefits of evaluation/treatment have been explained.   Patient/Family/caregiver agrees with Plan of Care.     Evaluation Time:     PT Kumar, Low Complexity Minutes (89713): 15     Signing Clinician: Trudi Rios DPT

## 2024-06-04 PROBLEM — M54.2 NECK PAIN: Status: ACTIVE | Noted: 2024-06-04

## 2024-06-05 ENCOUNTER — THERAPY VISIT (OUTPATIENT)
Dept: PHYSICAL THERAPY | Facility: OTHER | Age: 39
End: 2024-06-05
Attending: NURSE PRACTITIONER
Payer: COMMERCIAL

## 2024-06-05 DIAGNOSIS — M54.2 NECK PAIN: Primary | ICD-10-CM

## 2024-06-05 PROCEDURE — 97110 THERAPEUTIC EXERCISES: CPT | Mod: GP

## 2024-06-05 PROCEDURE — 97530 THERAPEUTIC ACTIVITIES: CPT | Mod: GP

## 2024-06-05 PROCEDURE — 97140 MANUAL THERAPY 1/> REGIONS: CPT | Mod: GP

## 2024-06-10 ENCOUNTER — THERAPY VISIT (OUTPATIENT)
Dept: PHYSICAL THERAPY | Facility: OTHER | Age: 39
End: 2024-06-10
Attending: NURSE PRACTITIONER
Payer: COMMERCIAL

## 2024-06-10 DIAGNOSIS — M54.2 NECK PAIN: Primary | ICD-10-CM

## 2024-06-10 PROCEDURE — 97140 MANUAL THERAPY 1/> REGIONS: CPT | Mod: GP

## 2024-06-10 PROCEDURE — 97110 THERAPEUTIC EXERCISES: CPT | Mod: GP

## 2024-06-10 PROCEDURE — 97530 THERAPEUTIC ACTIVITIES: CPT | Mod: GP

## 2024-06-19 ENCOUNTER — THERAPY VISIT (OUTPATIENT)
Dept: PHYSICAL THERAPY | Facility: OTHER | Age: 39
End: 2024-06-19
Attending: NURSE PRACTITIONER
Payer: COMMERCIAL

## 2024-06-19 DIAGNOSIS — M54.2 NECK PAIN: Primary | ICD-10-CM

## 2024-06-19 PROCEDURE — 97110 THERAPEUTIC EXERCISES: CPT | Mod: GP

## 2024-06-19 PROCEDURE — 97140 MANUAL THERAPY 1/> REGIONS: CPT | Mod: GP

## 2024-06-19 PROCEDURE — 97530 THERAPEUTIC ACTIVITIES: CPT | Mod: GP

## 2024-08-12 NOTE — PROGRESS NOTES
DISCHARGE  Reason for Discharge: Patient has not made expected progress due to interrupted treatment attendance, didn't attend last 5 scheduled appointments.    Equipment Issued: n/a    Discharge Plan: patient discharged, will need new orders to continue.    Referring Provider:  Rosa Fraser     06/19/24 1615   Appointment Info   Signing clinician's name / credentials Trudi Rios DPT   Visits Used 4   Medical Diagnosis Neck pain   PT Tx Diagnosis neck pain with mobility deficits, bilateral shoulder pain with strength and motor control deficits   Progress Note/Certification   Onset of illness/injury or Date of Surgery   (patient reports pain has been ongoing for several years)   Therapy Frequency 1-2x/week   Predicted Duration 8 weeks   Progress Note Completed Date 06/03/24   PT Goal 1   Goal Identifier pain   Goal Description patient will report 25% reduction of shoulder and neck pain with daily activities   Rationale to maximize safety and independence with performance of ADLs and functional tasks;to maximize safety and independence within the home;to maximize safety and independence with self cares;to maximize safety and independence within the community   Target Date 07/16/24   PT Goal 2   Goal Identifier self-care tasks   Goal Description patient will be able to complete self-care tasks including washing and styling hair with <2/10 pain   Rationale to maximize safety and independence with performance of ADLs and functional tasks;to maximize safety and independence with self cares;to maximize safety and independence within the home   Target Date 07/30/24   PT Goal 3   Goal Identifier strength   Goal Description patient will demonstrate 4+/5 gross bilateral shouldr strength   Rationale to maximize safety and independence with performance of ADLs and functional tasks;to maximize safety and independence within the home;to maximize safety and independence within the community;to maximize safety and  independence with self cares   Target Date 08/27/24   Subjective Report   Subjective Report Patient reports her L shoulder has been more painful, neck is stiff.   Objective Measure 1   Objective Measure shoulder strength   Details grossly 4/5 bilaterally   Therapeutic Procedure/Exercise   Therapeutic Procedures: strength, endurance, ROM, flexibility minutes (09837) 15   Ther Proc 1 strength, mobility   Ther Proc 1 - Details with 2lb weight s/l shoulder ER, abd, flex; s/l thoracic rotation; GABRIEL, prone W; chin tucks, cervical rotation with towel  (deferred: diaphragmatic breathing)   Patient Response/Progress well tolerated, demonstrates good understanding   Therapeutic Activity   Therapeutic Activities: dynamic activities to improve functional performance minutes (26601) 10   Ther Act 1 activity tolerance   Ther Act 1 - Details nustep 5min, level 5, seat 5; wall slides, wall ball circles   Manual Therapy   Manual Therapy: Mobilization, MFR, MLD, friction massage minutes (63341) 25   Manual Therapy 1 joint mobilization, STM   Manual Therapy 1 - Details cervical & thoracic PA glides grade II, III; STM to longus colli, cervical paraspinals, suboccipitals, upper traps, levator, scalenes, SCM, anterior & posterior shoulder; instructed in self-STM with tennis ball  (deferred: scap mobs in all planes grade III)   Patient Response/Progress positive   Education   Learner/Method Patient;Listening;Demonstration;Pictures/Video;No Barriers to Learning   Plan   Home program HEP, handout given  ([facility] access code [email or web address])   Plan for next session manual therapy, progress shoulder strength and coordination   Total Session Time   Timed Code Treatment Minutes 50   Total Treatment Time (sum of timed and untimed services) 50

## 2024-10-29 ASSESSMENT — PATIENT HEALTH QUESTIONNAIRE - PHQ9: SUM OF ALL RESPONSES TO PHQ QUESTIONS 1-9: 4

## 2025-03-27 ENCOUNTER — PATIENT OUTREACH (OUTPATIENT)
Dept: CARE COORDINATION | Facility: CLINIC | Age: 40
End: 2025-03-27
Payer: COMMERCIAL

## 2025-04-10 ENCOUNTER — PATIENT OUTREACH (OUTPATIENT)
Dept: CARE COORDINATION | Facility: CLINIC | Age: 40
End: 2025-04-10
Payer: COMMERCIAL

## 2025-06-07 ENCOUNTER — HEALTH MAINTENANCE LETTER (OUTPATIENT)
Age: 40
End: 2025-06-07

## (undated) RX ORDER — DIPHENHYDRAMINE HYDROCHLORIDE 50 MG/ML
INJECTION INTRAMUSCULAR; INTRAVENOUS
Status: DISPENSED
Start: 2018-11-04

## (undated) RX ORDER — CYCLOBENZAPRINE HCL 10 MG
TABLET ORAL
Status: DISPENSED
Start: 2020-03-06

## (undated) RX ORDER — LIDOCAINE HYDROCHLORIDE 10 MG/ML
INJECTION, SOLUTION EPIDURAL; INFILTRATION; INTRACAUDAL; PERINEURAL
Status: DISPENSED
Start: 2021-02-09

## (undated) RX ORDER — NEOMYCIN/BACITRACIN/POLYMYXINB 3.5-400-5K
OINTMENT (GRAM) TOPICAL
Status: DISPENSED
Start: 2021-02-09

## (undated) RX ORDER — SODIUM CHLORIDE, SODIUM LACTATE, POTASSIUM CHLORIDE, CALCIUM CHLORIDE 600; 310; 30; 20 MG/100ML; MG/100ML; MG/100ML; MG/100ML
INJECTION, SOLUTION INTRAVENOUS
Status: DISPENSED
Start: 2020-07-06

## (undated) RX ORDER — DIPHENHYDRAMINE HYDROCHLORIDE 50 MG/ML
INJECTION INTRAMUSCULAR; INTRAVENOUS
Status: DISPENSED
Start: 2018-08-25

## (undated) RX ORDER — KETOROLAC TROMETHAMINE 30 MG/ML
INJECTION, SOLUTION INTRAMUSCULAR; INTRAVENOUS
Status: DISPENSED
Start: 2018-11-04

## (undated) RX ORDER — KETOROLAC TROMETHAMINE 30 MG/ML
INJECTION, SOLUTION INTRAMUSCULAR; INTRAVENOUS
Status: DISPENSED
Start: 2018-08-25

## (undated) RX ORDER — SODIUM CHLORIDE 9 MG/ML
INJECTION, SOLUTION INTRAVENOUS
Status: DISPENSED
Start: 2018-11-04